# Patient Record
Sex: MALE | Race: WHITE | NOT HISPANIC OR LATINO | URBAN - METROPOLITAN AREA
[De-identification: names, ages, dates, MRNs, and addresses within clinical notes are randomized per-mention and may not be internally consistent; named-entity substitution may affect disease eponyms.]

---

## 2018-12-08 ENCOUNTER — INPATIENT (INPATIENT)
Facility: HOSPITAL | Age: 58
LOS: 13 days | Discharge: NURSING, REHAB OR LONG-TERM CARE | End: 2018-12-22
Attending: INTERNAL MEDICINE | Admitting: INTERNAL MEDICINE

## 2018-12-08 VITALS
OXYGEN SATURATION: 67 % | HEART RATE: 152 BPM | HEIGHT: 67 IN | SYSTOLIC BLOOD PRESSURE: 184 MMHG | DIASTOLIC BLOOD PRESSURE: 102 MMHG | RESPIRATION RATE: 32 BRPM | WEIGHT: 220.02 LBS

## 2018-12-08 LAB
ALBUMIN SERPL ELPH-MCNC: 3.8 G/DL — SIGNIFICANT CHANGE UP (ref 3.5–5.2)
ALBUMIN SERPL ELPH-MCNC: 4.7 G/DL — SIGNIFICANT CHANGE UP (ref 3.5–5.2)
ALP SERPL-CCNC: 130 U/L — HIGH (ref 30–115)
ALP SERPL-CCNC: 148 U/L — HIGH (ref 30–115)
ALT FLD-CCNC: 67 U/L — HIGH (ref 0–41)
ALT FLD-CCNC: 83 U/L — HIGH (ref 0–41)
ANION GAP SERPL CALC-SCNC: 12 MMOL/L — SIGNIFICANT CHANGE UP (ref 7–14)
ANION GAP SERPL CALC-SCNC: 18 MMOL/L — HIGH (ref 7–14)
AST SERPL-CCNC: 44 U/L — HIGH (ref 0–41)
AST SERPL-CCNC: 72 U/L — HIGH (ref 0–41)
BILIRUB SERPL-MCNC: 0.5 MG/DL — SIGNIFICANT CHANGE UP (ref 0.2–1.2)
BILIRUB SERPL-MCNC: 0.6 MG/DL — SIGNIFICANT CHANGE UP (ref 0.2–1.2)
BUN SERPL-MCNC: 14 MG/DL — SIGNIFICANT CHANGE UP (ref 10–20)
BUN SERPL-MCNC: 22 MG/DL — HIGH (ref 10–20)
CALCIUM SERPL-MCNC: 8.5 MG/DL — SIGNIFICANT CHANGE UP (ref 8.5–10.1)
CALCIUM SERPL-MCNC: 9 MG/DL — SIGNIFICANT CHANGE UP (ref 8.5–10.1)
CHLORIDE SERPL-SCNC: 93 MMOL/L — LOW (ref 98–110)
CHLORIDE SERPL-SCNC: 99 MMOL/L — SIGNIFICANT CHANGE UP (ref 98–110)
CK MB CFR SERPL CALC: 6.4 NG/ML — HIGH (ref 0.6–6.3)
CK SERPL-CCNC: 129 U/L — SIGNIFICANT CHANGE UP (ref 0–225)
CO2 SERPL-SCNC: 25 MMOL/L — SIGNIFICANT CHANGE UP (ref 17–32)
CO2 SERPL-SCNC: 30 MMOL/L — SIGNIFICANT CHANGE UP (ref 17–32)
CREAT SERPL-MCNC: 0.8 MG/DL — SIGNIFICANT CHANGE UP (ref 0.7–1.5)
CREAT SERPL-MCNC: 1 MG/DL — SIGNIFICANT CHANGE UP (ref 0.7–1.5)
GLUCOSE BLDC GLUCOMTR-MCNC: 102 MG/DL — HIGH (ref 70–99)
GLUCOSE BLDC GLUCOMTR-MCNC: 117 MG/DL — HIGH (ref 70–99)
GLUCOSE BLDC GLUCOMTR-MCNC: 132 MG/DL — HIGH (ref 70–99)
GLUCOSE BLDC GLUCOMTR-MCNC: 205 MG/DL — HIGH (ref 70–99)
GLUCOSE SERPL-MCNC: 289 MG/DL — HIGH (ref 70–99)
GLUCOSE SERPL-MCNC: 96 MG/DL — SIGNIFICANT CHANGE UP (ref 70–99)
HCT VFR BLD CALC: 45.2 % — SIGNIFICANT CHANGE UP (ref 42–52)
HCT VFR BLD CALC: 54.6 % — HIGH (ref 42–52)
HGB BLD-MCNC: 15.9 G/DL — SIGNIFICANT CHANGE UP (ref 14–18)
HGB BLD-MCNC: 18.6 G/DL — CRITICAL HIGH (ref 14–18)
LACTATE SERPL-SCNC: 1.4 MMOL/L — SIGNIFICANT CHANGE UP (ref 0.5–2.2)
MAGNESIUM SERPL-MCNC: 2.1 MG/DL — SIGNIFICANT CHANGE UP (ref 1.8–2.4)
MCHC RBC-ENTMCNC: 30.7 PG — SIGNIFICANT CHANGE UP (ref 27–31)
MCHC RBC-ENTMCNC: 30.9 PG — SIGNIFICANT CHANGE UP (ref 27–31)
MCHC RBC-ENTMCNC: 34.1 G/DL — SIGNIFICANT CHANGE UP (ref 32–37)
MCHC RBC-ENTMCNC: 35.2 G/DL — SIGNIFICANT CHANGE UP (ref 32–37)
MCV RBC AUTO: 87.8 FL — SIGNIFICANT CHANGE UP (ref 80–94)
MCV RBC AUTO: 90.2 FL — SIGNIFICANT CHANGE UP (ref 80–94)
NRBC # BLD: 0 /100 WBCS — SIGNIFICANT CHANGE UP (ref 0–0)
NRBC # BLD: 0 /100 WBCS — SIGNIFICANT CHANGE UP (ref 0–0)
NT-PROBNP SERPL-SCNC: 1617 PG/ML — HIGH (ref 0–300)
PHOSPHATE SERPL-MCNC: 6.2 MG/DL — HIGH (ref 2.1–4.9)
PLATELET # BLD AUTO: 276 K/UL — SIGNIFICANT CHANGE UP (ref 130–400)
PLATELET # BLD AUTO: 321 K/UL — SIGNIFICANT CHANGE UP (ref 130–400)
POTASSIUM SERPL-MCNC: 3.2 MMOL/L — LOW (ref 3.5–5)
POTASSIUM SERPL-MCNC: 4.7 MMOL/L — SIGNIFICANT CHANGE UP (ref 3.5–5)
POTASSIUM SERPL-SCNC: 3.2 MMOL/L — LOW (ref 3.5–5)
POTASSIUM SERPL-SCNC: 4.7 MMOL/L — SIGNIFICANT CHANGE UP (ref 3.5–5)
PROT SERPL-MCNC: 6.4 G/DL — SIGNIFICANT CHANGE UP (ref 6–8)
PROT SERPL-MCNC: 7.9 G/DL — SIGNIFICANT CHANGE UP (ref 6–8)
RBC # BLD: 5.15 M/UL — SIGNIFICANT CHANGE UP (ref 4.7–6.1)
RBC # BLD: 6.05 M/UL — SIGNIFICANT CHANGE UP (ref 4.7–6.1)
RBC # FLD: 13.1 % — SIGNIFICANT CHANGE UP (ref 11.5–14.5)
RBC # FLD: 13.2 % — SIGNIFICANT CHANGE UP (ref 11.5–14.5)
SODIUM SERPL-SCNC: 136 MMOL/L — SIGNIFICANT CHANGE UP (ref 135–146)
SODIUM SERPL-SCNC: 141 MMOL/L — SIGNIFICANT CHANGE UP (ref 135–146)
TROPONIN T SERPL-MCNC: 0.04 NG/ML — CRITICAL HIGH
TROPONIN T SERPL-MCNC: <0.01 NG/ML — SIGNIFICANT CHANGE UP
WBC # BLD: 16.04 K/UL — HIGH (ref 4.8–10.8)
WBC # BLD: 20.74 K/UL — HIGH (ref 4.8–10.8)
WBC # FLD AUTO: 16.04 K/UL — HIGH (ref 4.8–10.8)
WBC # FLD AUTO: 20.74 K/UL — HIGH (ref 4.8–10.8)

## 2018-12-08 RX ORDER — PROPOFOL 10 MG/ML
20 INJECTION, EMULSION INTRAVENOUS
Qty: 500 | Refills: 0 | Status: DISCONTINUED | OUTPATIENT
Start: 2018-12-08 | End: 2018-12-08

## 2018-12-08 RX ORDER — POTASSIUM CHLORIDE 20 MEQ
20 PACKET (EA) ORAL ONCE
Qty: 0 | Refills: 0 | Status: COMPLETED | OUTPATIENT
Start: 2018-12-08 | End: 2018-12-08

## 2018-12-08 RX ORDER — NITROGLYCERIN 6.5 MG
10 CAPSULE, EXTENDED RELEASE ORAL
Qty: 50 | Refills: 0 | Status: DISCONTINUED | OUTPATIENT
Start: 2018-12-08 | End: 2018-12-08

## 2018-12-08 RX ORDER — INSULIN HUMAN 100 [IU]/ML
2 INJECTION, SOLUTION SUBCUTANEOUS
Qty: 50 | Refills: 0 | Status: DISCONTINUED | OUTPATIENT
Start: 2018-12-08 | End: 2018-12-08

## 2018-12-08 RX ORDER — FENTANYL CITRATE 50 UG/ML
0.5 INJECTION INTRAVENOUS
Qty: 2500 | Refills: 0 | Status: DISCONTINUED | OUTPATIENT
Start: 2018-12-08 | End: 2018-12-08

## 2018-12-08 RX ORDER — DEXTROSE 50 % IN WATER 50 %
25 SYRINGE (ML) INTRAVENOUS
Qty: 0 | Refills: 0 | Status: DISCONTINUED | OUTPATIENT
Start: 2018-12-08 | End: 2018-12-22

## 2018-12-08 RX ORDER — ARIPIPRAZOLE 15 MG/1
1 TABLET ORAL
Qty: 0 | Refills: 0 | COMMUNITY

## 2018-12-08 RX ORDER — FUROSEMIDE 40 MG
40 TABLET ORAL
Qty: 0 | Refills: 0 | Status: DISCONTINUED | OUTPATIENT
Start: 2018-12-08 | End: 2018-12-09

## 2018-12-08 RX ORDER — PANTOPRAZOLE SODIUM 20 MG/1
40 TABLET, DELAYED RELEASE ORAL DAILY
Qty: 0 | Refills: 0 | Status: DISCONTINUED | OUTPATIENT
Start: 2018-12-08 | End: 2018-12-13

## 2018-12-08 RX ORDER — SODIUM CHLORIDE 9 MG/ML
3100 INJECTION, SOLUTION INTRAVENOUS ONCE
Qty: 0 | Refills: 0 | Status: DISCONTINUED | OUTPATIENT
Start: 2018-12-08 | End: 2018-12-08

## 2018-12-08 RX ORDER — FENTANYL CITRATE 50 UG/ML
100 INJECTION INTRAVENOUS ONCE
Qty: 0 | Refills: 0 | Status: DISCONTINUED | OUTPATIENT
Start: 2018-12-08 | End: 2018-12-08

## 2018-12-08 RX ORDER — ENOXAPARIN SODIUM 100 MG/ML
40 INJECTION SUBCUTANEOUS EVERY 24 HOURS
Qty: 0 | Refills: 0 | Status: DISCONTINUED | OUTPATIENT
Start: 2018-12-08 | End: 2018-12-12

## 2018-12-08 RX ORDER — CHLORHEXIDINE GLUCONATE 213 G/1000ML
15 SOLUTION TOPICAL
Qty: 0 | Refills: 0 | Status: DISCONTINUED | OUTPATIENT
Start: 2018-12-08 | End: 2018-12-21

## 2018-12-08 RX ORDER — ARIPIPRAZOLE 15 MG/1
20 TABLET ORAL DAILY
Qty: 0 | Refills: 0 | Status: DISCONTINUED | OUTPATIENT
Start: 2018-12-08 | End: 2018-12-22

## 2018-12-08 RX ORDER — DEXTROSE 50 % IN WATER 50 %
50 SYRINGE (ML) INTRAVENOUS
Qty: 0 | Refills: 0 | Status: DISCONTINUED | OUTPATIENT
Start: 2018-12-08 | End: 2018-12-22

## 2018-12-08 RX ORDER — NOREPINEPHRINE BITARTRATE/D5W 8 MG/250ML
0.05 PLASTIC BAG, INJECTION (ML) INTRAVENOUS
Qty: 8 | Refills: 0 | Status: DISCONTINUED | OUTPATIENT
Start: 2018-12-08 | End: 2018-12-20

## 2018-12-08 RX ORDER — FENTANYL CITRATE 50 UG/ML
0.5 INJECTION INTRAVENOUS
Qty: 2500 | Refills: 0 | Status: DISCONTINUED | OUTPATIENT
Start: 2018-12-08 | End: 2018-12-09

## 2018-12-08 RX ORDER — PROPOFOL 10 MG/ML
50 INJECTION, EMULSION INTRAVENOUS ONCE
Qty: 0 | Refills: 0 | Status: COMPLETED | OUTPATIENT
Start: 2018-12-08 | End: 2018-12-08

## 2018-12-08 RX ORDER — MIDAZOLAM HYDROCHLORIDE 1 MG/ML
0.02 INJECTION, SOLUTION INTRAMUSCULAR; INTRAVENOUS
Qty: 100 | Refills: 0 | Status: DISCONTINUED | OUTPATIENT
Start: 2018-12-08 | End: 2018-12-09

## 2018-12-08 RX ORDER — CHLORHEXIDINE GLUCONATE 213 G/1000ML
1 SOLUTION TOPICAL
Qty: 0 | Refills: 0 | Status: DISCONTINUED | OUTPATIENT
Start: 2018-12-08 | End: 2018-12-22

## 2018-12-08 RX ORDER — INSULIN HUMAN 100 [IU]/ML
2 INJECTION, SOLUTION SUBCUTANEOUS
Qty: 100 | Refills: 0 | Status: DISCONTINUED | OUTPATIENT
Start: 2018-12-08 | End: 2018-12-20

## 2018-12-08 RX ADMIN — Medication 40 MILLIGRAM(S): at 19:37

## 2018-12-08 RX ADMIN — Medication 9.36 MICROGRAM(S)/KG/MIN: at 15:00

## 2018-12-08 RX ADMIN — FENTANYL CITRATE 4.99 MICROGRAM(S)/KG/HR: 50 INJECTION INTRAVENOUS at 15:59

## 2018-12-08 RX ADMIN — FENTANYL CITRATE 100 MICROGRAM(S): 50 INJECTION INTRAVENOUS at 14:30

## 2018-12-08 RX ADMIN — PROPOFOL 50 MILLIGRAM(S): 10 INJECTION, EMULSION INTRAVENOUS at 15:35

## 2018-12-08 RX ADMIN — PROPOFOL 50 MILLIGRAM(S): 10 INJECTION, EMULSION INTRAVENOUS at 16:00

## 2018-12-08 RX ADMIN — ENOXAPARIN SODIUM 40 MILLIGRAM(S): 100 INJECTION SUBCUTANEOUS at 22:02

## 2018-12-08 RX ADMIN — Medication 50 MILLIEQUIVALENT(S): at 19:37

## 2018-12-08 RX ADMIN — MIDAZOLAM HYDROCHLORIDE 2 MG/KG/HR: 1 INJECTION, SOLUTION INTRAMUSCULAR; INTRAVENOUS at 19:38

## 2018-12-08 RX ADMIN — INSULIN HUMAN 2 UNIT(S)/HR: 100 INJECTION, SOLUTION SUBCUTANEOUS at 19:30

## 2018-12-08 RX ADMIN — FENTANYL CITRATE 4.99 MICROGRAM(S)/KG/HR: 50 INJECTION INTRAVENOUS at 19:30

## 2018-12-08 RX ADMIN — PROPOFOL 11.98 MICROGRAM(S)/KG/MIN: 10 INJECTION, EMULSION INTRAVENOUS at 17:18

## 2018-12-08 RX ADMIN — Medication 9.36 MICROGRAM(S)/KG/MIN: at 19:37

## 2018-12-08 RX ADMIN — Medication 3 MICROGRAM(S)/MIN: at 14:37

## 2018-12-08 NOTE — ED PROVIDER NOTE - PROGRESS NOTE DETAILS
patient accepted to ICU by Dr. Goss. I was directly involved in the care of this patient. PA Fellow Charisse note/plan reviewed and agreed.

## 2018-12-08 NOTE — ED PROVIDER NOTE - PMH
COPD (chronic obstructive pulmonary disease)    Diabetes    ETOH abuse    HLD (hyperlipidemia)    Hypertension, unspecified type    Psych & behavrl factors assoc w disord or dis classd elswhr    Schizophrenia

## 2018-12-08 NOTE — ED ADULT NURSE NOTE - PMH
Diabetes    Psych & behavrl factors assoc w disord or dis classd elswhr COPD (chronic obstructive pulmonary disease)    Diabetes    ETOH abuse    HLD (hyperlipidemia)    Hypertension, unspecified type    Psych & behavrl factors assoc w disord or dis classd elswhr    Schizophrenia

## 2018-12-08 NOTE — ED ADULT NURSE NOTE - NSIMPLEMENTINTERV_GEN_ALL_ED
Implemented All Universal Safety Interventions:  [place] to call system. Call bell, personal items and telephone within reach. Instruct patient to call for assistance. Room bathroom lighting operational. Non-slip footwear when patient is off stretcher. Physically safe environment: no spills, clutter or unnecessary equipment. Stretcher in lowest position, wheels locked, appropriate side rails in place.

## 2018-12-08 NOTE — ED PROCEDURE NOTE - ATTENDING CONTRIBUTION TO CARE
I was present for and supervised the key critical aspects of the procedures performed during the care of the patient.

## 2018-12-08 NOTE — ED PROVIDER NOTE - ATTENDING CONTRIBUTION TO CARE
58 M past medical history unknown brought in by EMS as respiratory distress.  Pt was at [employer] and collapsed shortness of breath.  According to brother who was with him, similar episode night before, better with sitting up.  Exam cyanotic, unreponsive, on CPAP prehospital.  Not responding.  Pt intubated on arrival.  Sat in the high 70s, FiO2 100% PEEP increased to 14.  nitro drip started and central line placed.  EKG no STEMI.  Chest X-Ray shows diffuse pulmonary edema.  Plan is admit to ICU, accepted by Dr. Goss.

## 2018-12-08 NOTE — CONSULT NOTE ADULT - SUBJECTIVE AND OBJECTIVE BOX
Patient is a 58y old  Male who presents with a chief complaint of sudden SOB.    HPI: Patient has a h/o DM and HTN. Had some shortness of breath last night and got better. This afternoon he had it again and was sent to ER by ambulance. No fever or vomiting. No chest pain. In ER patient had high BP on arrival and was treated with IV NTG and later he was intubated and required IV Levophed and IV NTG was discontinued due to low BP. Normal rectal temp in ER.        PAST MEDICAL & SURGICAL HISTORY:  ETOH abuse  HLD (hyperlipidemia)  Hypertension, unspecified type  COPD (chronic obstructive pulmonary disease)  Schizophrenia  Psych & behavrl factors assoc w disord or dis classd elswhr  Diabetes  No significant past surgical history        MEDICATIONS  (STANDING):  fentaNYL   Infusion 0.5 MICROgram(s)/kG/Hr (4.99 mL/Hr) IV Continuous <Continuous>  norepinephrine Infusion 0.05 MICROgram(s)/kG/Min (9.356 mL/Hr) IV Continuous <Continuous>  propofol Infusion 20 MICROgram(s)/kG/Min (11.976 mL/Hr) IV Continuous <Continuous>    Allergies    No Known Allergies    Intolerances  Personal History- smoker, from papers.    Vital Signs Last 24 Hrs  T(C): 36.9 (08 Dec 2018 14:16), Max: 36.9 (08 Dec 2018 14:16)  T(F): 98.4 (08 Dec 2018 14:16), Max: 98.4 (08 Dec 2018 14:16)  HR: 94 (08 Dec 2018 16:27) (94 - 152)  BP: 116/60 (08 Dec 2018 16:27) (100/57 - 184/102)  BP(mean): --  RR: 18 (08 Dec 2018 16:27) (18 - 32)  SpO2: 93% (08 Dec 2018 16:27) (67% - 93%)  PHYSICAL EXAM:      Constitutional: well developed.    Eyes: normal conjunctivae.    ENMT: intubated.    Neck: central line in right IJV.    Breasts:        Respiratory: CREPITATIONS, POSTERIORLY. NO WHEEZING.    Cardiovascular: Regular. no murmur.    Gastrointestinal: no tenderness or distension.        Rectal: deferred.    Extremities: no leg edema.    Vascular: no varicose veins.    Neurological: sedated on ventilator.    Skin: no rash.    Lymph Nodes: none felt.    Musculoskeletal:    Psychiatric:      12-08    136  |  93<L>  |  14  ----------------------------<  289<H>  3.2<L>   |  25  |  1.0    Ca    9.0      08 Dec 2018 14:12    TPro  7.9  /  Alb  4.7  /  TBili  0.6  /  DBili  x   /  AST  72<H>  /  ALT  83<H>  /  AlkPhos  148<H>  12-08      CBC Full  -  ( 08 Dec 2018 14:12 )  WBC Count : 20.74 K/uL  Hemoglobin : 18.6 g/dL  Hematocrit : 54.6 %  Platelet Count - Automated : 321 K/uL  Mean Cell Volume : 90.2 fL  Mean Cell Hemoglobin : 30.7 pg  Mean Cell Hemoglobin Concentration : 34.1 g/dL  Auto Neutrophil # : x  Auto Lymphocyte # : x  Auto Monocyte # : x  Auto Eosinophil # : x  Auto Basophil # : x  Auto Neutrophil % : x  Auto Lymphocyte % : x  Auto Monocyte % : x  Auto Eosinophil % : x  Auto Basophil % : x      ABG - ( 08 Dec 2018 14:33 )  pH, Arterial: 7.14  pH, Blood: x     /  pCO2: 69    /  pO2: 100   / HCO3: 24    / Base Excess: -7.2  /  SaO2: 95        Pro BNP- 1400          EKG sinus tachycardia , [facility].    Radiology: BILATERAL CONGESTION , RIGHT SMALL PLEURAL EFFUSION AND CARDIOMEGALY. ET tube ok.

## 2018-12-08 NOTE — ED PROVIDER NOTE - CARE PLAN
Principal Discharge DX:	Acute respiratory failure with hypoxia and hypercapnia  Secondary Diagnosis:	Acute congestive heart failure, unspecified heart failure type

## 2018-12-08 NOTE — ED PROVIDER NOTE - PHYSICAL EXAMINATION
I am unable to obtain a comprehensive history, review of systems, past medical history, and/or physical exam due to constraints imposed by the urgency of the patient's clinical condition and/or mental status.    CONST: Pt in acute distress brought by EMS on CPAP  CARD: Normal S1 S2; tachycardic  RESP: Distressed breathing with wheezing bilaterally  GI: Soft, non-tender, non-distended. normal BS  MS: No midline spinal tenderness. pulses 2 +. no calf tenderness or swelling  SKIN: Pale, cold, diaphoretic I am unable to obtain a comprehensive history, review of systems, past medical history, and/or physical exam due to constraints imposed by the urgency of the patient's clinical condition and/or mental status.    CONST: Pt in acute distress brought by EMS on CPAP  Eyes: PERRLA pupils equal size  CARD: Normal S1 S2; tachycardic  RESP: Distressed breathing with wheezing bilaterally and decreased b/l  GI: Soft, non-tender, non-distended. normal BS  MS: No midline spinal tenderness. pulses 2 +. no calf tenderness or swelling  SKIN: Pale, cold, diaphoretic  Neuro: patient responsive x 4 to pain

## 2018-12-08 NOTE — ED PROCEDURE NOTE - CPROC ED POST RADIOGRAPHY1
post-procedure radiography performed/gastric tube in stomach/duodenum
post-procedure radiography performed

## 2018-12-08 NOTE — ED PROCEDURE NOTE - CPROC ED TIME OUT STATEMENT1
“Patient's name, , procedure and correct site were confirmed during the [place] Timeout.”
“Patient's name, , procedure and correct site were confirmed during the [place] Timeout.”
“Patient's name, , procedure and correct site were confirmed during the [place] Timeout.”

## 2018-12-08 NOTE — CONSULT NOTE ADULT - ASSESSMENT
Assessment-  1. Acute pulmonary edema. R/O ACS.  2. Acute respiratory failure , requires intubation.  3. Shock due to CHF. On pressors.  4. DM - type 2, on insulin.  5. Schizophrenia ?.  6. H/O HTN.        Recommendations-  1. Admit in CCU.  2. Continue on the ventilator. A/C and PEEP. Decrease PEEP from 14 to 8 because of hypotension.  3. Maintain BP at present with IV Levophed. Decrease the requirement slowly as BP improves.  4. IV Lasix.  5. Sedation on the ventilator and use minimal propofol.  6. SC Lovenox for DVT prophylaxis.  7. Start ARB / B Blockers as BP permits.  8. Hold Victoza at present.  9. FS Glucose and insulin coverage.  10. Cardiac enzymes.  11. Replace potassium.  12. ABG later.  13. ECHO.  14. Cardiology consult.

## 2018-12-08 NOTE — ED PROVIDER NOTE - OBJECTIVE STATEMENT
57yo M brought by EMS with difficulty breathing. Pt had an episode of SOB last night that resolved with sitting 59yo M brought by EMS with difficulty breathing. Pt had an episode of SOB last night that resolved with sitting up. Today he was asymptomatic until he ate two slices of pizza and became acutely SOB. 59yo M brought by EMS with difficulty breathing. Pt had an episode of SOB last night that resolved with sitting up. Today he was shortness of breath and after he ate two slices of pizza and became acutely shortness of breath and finally family called 911. EMS states that he was in distress with low O2 sat and placed on CPAP and transported to hosptial .

## 2018-12-08 NOTE — ED PROCEDURE NOTE - CPROC ED GASTRIC INTUB DETAIL1
Placement was confirmed by aspiration of gastric secretions./The nasogastric tube (see size above) was inserted via the anatomic location./Bowel sounds present to 4 quadrants./Gastric tube connected to low continuous suction./Flush technique:

## 2018-12-09 LAB
ALBUMIN SERPL ELPH-MCNC: 3.8 G/DL — SIGNIFICANT CHANGE UP (ref 3.5–5.2)
ALP SERPL-CCNC: 109 U/L — SIGNIFICANT CHANGE UP (ref 30–115)
ALT FLD-CCNC: 63 U/L — HIGH (ref 0–41)
AMYLASE P1 CFR SERPL: 79 U/L — SIGNIFICANT CHANGE UP (ref 25–115)
ANION GAP SERPL CALC-SCNC: 11 MMOL/L — SIGNIFICANT CHANGE UP (ref 7–14)
APTT BLD: 28.9 SEC — SIGNIFICANT CHANGE UP (ref 27–39.2)
AST SERPL-CCNC: 34 U/L — SIGNIFICANT CHANGE UP (ref 0–41)
BASE EXCESS BLDA CALC-SCNC: 2.5 MMOL/L — HIGH (ref -2–2)
BILIRUB SERPL-MCNC: 0.6 MG/DL — SIGNIFICANT CHANGE UP (ref 0.2–1.2)
BUN SERPL-MCNC: 25 MG/DL — HIGH (ref 10–20)
CALCIUM SERPL-MCNC: 8.1 MG/DL — LOW (ref 8.5–10.1)
CHLORIDE SERPL-SCNC: 100 MMOL/L — SIGNIFICANT CHANGE UP (ref 98–110)
CK MB CFR SERPL CALC: 4.8 NG/ML — SIGNIFICANT CHANGE UP (ref 0.6–6.3)
CK SERPL-CCNC: 110 U/L — SIGNIFICANT CHANGE UP (ref 0–225)
CO2 SERPL-SCNC: 27 MMOL/L — SIGNIFICANT CHANGE UP (ref 17–32)
CREAT SERPL-MCNC: 0.8 MG/DL — SIGNIFICANT CHANGE UP (ref 0.7–1.5)
GLUCOSE BLDC GLUCOMTR-MCNC: 114 MG/DL — HIGH (ref 70–99)
GLUCOSE BLDC GLUCOMTR-MCNC: 117 MG/DL — HIGH (ref 70–99)
GLUCOSE BLDC GLUCOMTR-MCNC: 134 MG/DL — HIGH (ref 70–99)
GLUCOSE BLDC GLUCOMTR-MCNC: 151 MG/DL — HIGH (ref 70–99)
GLUCOSE BLDC GLUCOMTR-MCNC: 156 MG/DL — HIGH (ref 70–99)
GLUCOSE BLDC GLUCOMTR-MCNC: 158 MG/DL — HIGH (ref 70–99)
GLUCOSE BLDC GLUCOMTR-MCNC: 159 MG/DL — HIGH (ref 70–99)
GLUCOSE BLDC GLUCOMTR-MCNC: 162 MG/DL — HIGH (ref 70–99)
GLUCOSE BLDC GLUCOMTR-MCNC: 169 MG/DL — HIGH (ref 70–99)
GLUCOSE BLDC GLUCOMTR-MCNC: 185 MG/DL — HIGH (ref 70–99)
GLUCOSE SERPL-MCNC: 168 MG/DL — HIGH (ref 70–99)
GRAM STN FLD: SIGNIFICANT CHANGE UP
HCO3 BLDA-SCNC: 30 MMOL/L — HIGH (ref 23–27)
HCT VFR BLD CALC: 46 % — SIGNIFICANT CHANGE UP (ref 42–52)
HGB BLD-MCNC: 15.7 G/DL — SIGNIFICANT CHANGE UP (ref 14–18)
HOROWITZ INDEX BLDA+IHG-RTO: 80 — SIGNIFICANT CHANGE UP
INR BLD: 1.17 RATIO — SIGNIFICANT CHANGE UP (ref 0.65–1.3)
LACTATE SERPL-SCNC: 0.7 MMOL/L — SIGNIFICANT CHANGE UP (ref 0.5–2.2)
LIDOCAIN IGE QN: 40 U/L — SIGNIFICANT CHANGE UP (ref 7–60)
MAGNESIUM SERPL-MCNC: 1.9 MG/DL — SIGNIFICANT CHANGE UP (ref 1.8–2.4)
MCHC RBC-ENTMCNC: 30.8 PG — SIGNIFICANT CHANGE UP (ref 27–31)
MCHC RBC-ENTMCNC: 34.1 G/DL — SIGNIFICANT CHANGE UP (ref 32–37)
MCV RBC AUTO: 90.2 FL — SIGNIFICANT CHANGE UP (ref 80–94)
NRBC # BLD: 0 /100 WBCS — SIGNIFICANT CHANGE UP (ref 0–0)
PCO2 BLDA: 59 MMHG — HIGH (ref 38–42)
PH BLDA: 7.32 — LOW (ref 7.38–7.42)
PHOSPHATE SERPL-MCNC: 5.1 MG/DL — HIGH (ref 2.1–4.9)
PLATELET # BLD AUTO: 261 K/UL — SIGNIFICANT CHANGE UP (ref 130–400)
PO2 BLDA: 71 MMHG — LOW (ref 78–95)
POTASSIUM SERPL-MCNC: 4.1 MMOL/L — SIGNIFICANT CHANGE UP (ref 3.5–5)
POTASSIUM SERPL-SCNC: 4.1 MMOL/L — SIGNIFICANT CHANGE UP (ref 3.5–5)
PROT SERPL-MCNC: 6.4 G/DL — SIGNIFICANT CHANGE UP (ref 6–8)
PROTHROM AB SERPL-ACNC: 12.7 SEC — SIGNIFICANT CHANGE UP (ref 9.95–12.87)
RBC # BLD: 5.1 M/UL — SIGNIFICANT CHANGE UP (ref 4.7–6.1)
RBC # FLD: 13.7 % — SIGNIFICANT CHANGE UP (ref 11.5–14.5)
SAO2 % BLDA: 94 % — SIGNIFICANT CHANGE UP (ref 94–98)
SODIUM SERPL-SCNC: 138 MMOL/L — SIGNIFICANT CHANGE UP (ref 135–146)
SPECIMEN SOURCE: SIGNIFICANT CHANGE UP
TROPONIN T SERPL-MCNC: 0.05 NG/ML — CRITICAL HIGH
WBC # BLD: 14.08 K/UL — HIGH (ref 4.8–10.8)
WBC # FLD AUTO: 14.08 K/UL — HIGH (ref 4.8–10.8)

## 2018-12-09 RX ORDER — CEFEPIME 1 G/1
2000 INJECTION, POWDER, FOR SOLUTION INTRAMUSCULAR; INTRAVENOUS ONCE
Qty: 0 | Refills: 0 | Status: COMPLETED | OUTPATIENT
Start: 2018-12-09 | End: 2018-12-09

## 2018-12-09 RX ORDER — CEFEPIME 1 G/1
INJECTION, POWDER, FOR SOLUTION INTRAMUSCULAR; INTRAVENOUS
Qty: 0 | Refills: 0 | Status: DISCONTINUED | OUTPATIENT
Start: 2018-12-09 | End: 2018-12-10

## 2018-12-09 RX ORDER — CEFEPIME 1 G/1
2000 INJECTION, POWDER, FOR SOLUTION INTRAMUSCULAR; INTRAVENOUS EVERY 12 HOURS
Qty: 0 | Refills: 0 | Status: DISCONTINUED | OUTPATIENT
Start: 2018-12-09 | End: 2018-12-10

## 2018-12-09 RX ORDER — ACETAMINOPHEN 500 MG
325 TABLET ORAL EVERY 4 HOURS
Qty: 0 | Refills: 0 | Status: DISCONTINUED | OUTPATIENT
Start: 2018-12-09 | End: 2018-12-09

## 2018-12-09 RX ORDER — ACETAMINOPHEN 500 MG
650 TABLET ORAL EVERY 6 HOURS
Qty: 0 | Refills: 0 | Status: DISCONTINUED | OUTPATIENT
Start: 2018-12-09 | End: 2018-12-20

## 2018-12-09 RX ADMIN — Medication 325 MILLIGRAM(S): at 11:05

## 2018-12-09 RX ADMIN — INSULIN HUMAN 2 UNIT(S)/HR: 100 INJECTION, SOLUTION SUBCUTANEOUS at 15:00

## 2018-12-09 RX ADMIN — CHLORHEXIDINE GLUCONATE 1 APPLICATION(S): 213 SOLUTION TOPICAL at 02:37

## 2018-12-09 RX ADMIN — Medication 9.36 MICROGRAM(S)/KG/MIN: at 07:30

## 2018-12-09 RX ADMIN — Medication 650 MILLIGRAM(S): at 17:12

## 2018-12-09 RX ADMIN — MIDAZOLAM HYDROCHLORIDE 2 MG/KG/HR: 1 INJECTION, SOLUTION INTRAMUSCULAR; INTRAVENOUS at 07:30

## 2018-12-09 RX ADMIN — FENTANYL CITRATE 4.99 MICROGRAM(S)/KG/HR: 50 INJECTION INTRAVENOUS at 07:30

## 2018-12-09 RX ADMIN — Medication 9.36 MICROGRAM(S)/KG/MIN: at 05:38

## 2018-12-09 RX ADMIN — Medication 40 MILLIGRAM(S): at 05:53

## 2018-12-09 RX ADMIN — MIDAZOLAM HYDROCHLORIDE 2 MG/KG/HR: 1 INJECTION, SOLUTION INTRAMUSCULAR; INTRAVENOUS at 02:30

## 2018-12-09 RX ADMIN — CEFEPIME 100 MILLIGRAM(S): 1 INJECTION, POWDER, FOR SOLUTION INTRAMUSCULAR; INTRAVENOUS at 18:00

## 2018-12-09 RX ADMIN — Medication 75 MILLIGRAM(S): at 18:00

## 2018-12-09 RX ADMIN — CHLORHEXIDINE GLUCONATE 15 MILLILITER(S): 213 SOLUTION TOPICAL at 05:53

## 2018-12-09 RX ADMIN — Medication 9.36 MICROGRAM(S)/KG/MIN: at 10:52

## 2018-12-09 RX ADMIN — PANTOPRAZOLE SODIUM 40 MILLIGRAM(S): 20 TABLET, DELAYED RELEASE ORAL at 11:12

## 2018-12-09 RX ADMIN — Medication 325 MILLIGRAM(S): at 04:08

## 2018-12-09 RX ADMIN — MIDAZOLAM HYDROCHLORIDE 2 MG/KG/HR: 1 INJECTION, SOLUTION INTRAMUSCULAR; INTRAVENOUS at 15:26

## 2018-12-09 RX ADMIN — ENOXAPARIN SODIUM 40 MILLIGRAM(S): 100 INJECTION SUBCUTANEOUS at 21:45

## 2018-12-09 RX ADMIN — Medication 325 MILLIGRAM(S): at 12:30

## 2018-12-09 RX ADMIN — Medication 650 MILLIGRAM(S): at 15:22

## 2018-12-09 RX ADMIN — Medication 650 MILLIGRAM(S): at 23:04

## 2018-12-09 RX ADMIN — Medication 325 MILLIGRAM(S): at 02:30

## 2018-12-09 RX ADMIN — CEFEPIME 100 MILLIGRAM(S): 1 INJECTION, POWDER, FOR SOLUTION INTRAMUSCULAR; INTRAVENOUS at 03:02

## 2018-12-09 RX ADMIN — FENTANYL CITRATE 4.99 MICROGRAM(S)/KG/HR: 50 INJECTION INTRAVENOUS at 10:52

## 2018-12-09 RX ADMIN — CHLORHEXIDINE GLUCONATE 15 MILLILITER(S): 213 SOLUTION TOPICAL at 18:00

## 2018-12-09 RX ADMIN — Medication 75 MILLIGRAM(S): at 10:48

## 2018-12-09 RX ADMIN — ARIPIPRAZOLE 20 MILLIGRAM(S): 15 TABLET ORAL at 11:12

## 2018-12-09 NOTE — CONSULT NOTE ADULT - SUBJECTIVE AND OBJECTIVE BOX
CARDIOLOGY CONSULT NOTE     CHIEF COMPLAINT/REASON FOR CONSULT:    HPI:         Patient has a h/o DM and HTN. Had some shortness of breath one day ago  and got better. Yesterday afternoon he had it again and was sent to ER by ambulance. No fever or vomiting. No chest pain. In ER patient had high BP on arrival and was treated with IV NTG and later he was intubated and required IV Levophed.           PAST MEDICAL & SURGICAL HISTORY:  ETOH abuse  HLD (hyperlipidemia)  Hypertension, unspecified type  COPD (chronic obstructive pulmonary disease)  Schizophrenia  Diabetes  No significant past surgical history      Cardiac Risks:   [ x]HTN, [x ] DM, [ ] Smoking, [ ] FH,  [x ] Lipids        MEDICATIONS:  MEDICATIONS  (STANDING):  ARIPiprazole 20 milliGRAM(s) Oral daily  cefepime   IVPB      cefepime   IVPB 2000 milliGRAM(s) IV Intermittent every 12 hours  chlorhexidine 0.12% Liquid 15 milliLiter(s) Oral Mucosa two times a day  chlorhexidine 4% Liquid 1 Application(s) Topical <User Schedule>  dextrose 50% Injectable 50 milliLiter(s) IV Push every 15 minutes  dextrose 50% Injectable 25 milliLiter(s) IV Push every 15 minutes  enoxaparin Injectable 40 milliGRAM(s) SubCutaneous every 24 hours  fentaNYL   Infusion 0.5 MICROgram(s)/kG/Hr (4.99 mL/Hr) IV Continuous <Continuous>  furosemide   Injectable 40 milliGRAM(s) IV Push two times a day  insulin Infusion 2 Unit(s)/Hr (2 mL/Hr) IV Continuous <Continuous>  midazolam Infusion 0.02 mG/kG/Hr (1.996 mL/Hr) IV Continuous <Continuous>  norepinephrine Infusion 0.05 MICROgram(s)/kG/Min (9.356 mL/Hr) IV Continuous <Continuous>  pantoprazole  Injectable 40 milliGRAM(s) IV Push daily      FAMILY HISTORY:      SOCIAL HISTORY:      [ ] Marital status  Single  Allergies    No Known Allergies      	    REVIEW OF SYSTEMS:  CONSTITUTIONAL: No fever, weight loss, or fatigue  EYES: No eye pain, visual disturbances, or discharge  ENMT:  No difficulty hearing, tinnitus, vertigo; No sinus or throat pain  NECK: No pain or stiffness  RESPIRATORY: See above  CARDIOVASCULAR: No chest pain, palpitations, passing out, dizziness, or leg swelling  GASTROINTESTINAL: No abdominal or epigastric pain. No nausea, vomiting, or hematemesis; No diarrhea or constipation. No melena or hematochezia.  GENITOURINARY: No dysuria, frequency, hematuria, or incontinence  NEUROLOGICAL: No headaches, memory loss, loss of strength, numbness, or tremors  SKIN: No itching, burning, rashes, or lesions   	    [ ] All others negative	  [ ] Unable to obtain    PHYSICAL EXAM:  T(C): 38 (12-09-18 @ 06:00), Max: 38.5 (12-09-18 @ 02:00)  HR: 89 (12-09-18 @ 06:00) (86 - 152)  BP: 99/65 (12-09-18 @ 06:00) (80/51 - 184/102)  RR: 20 (12-09-18 @ 06:00) (14 - 32)  SpO2: 100% (12-09-18 @ 03:00) (67% - 100%)  Wt(kg): --  I&O's Summary    08 Dec 2018 07:01  -  09 Dec 2018 07:00  --------------------------------------------------------  IN: 857.4 mL / OUT: 905 mL / NET: -47.6 mL        Appearance: Normal	  Psychiatry: A & O x 3, Mood & affect appropriate  HEENT:   Normal oral mucosa, PERRL, EOMI	  Lymphatic: No lymphadenopathy  Cardiovascular: Normal S1 S2,RRR, No JVD, No murmurs  Respiratory: Lungs clear to auscultation	  Gastrointestinal:  Soft, Non-tender, + BS	  Skin: No rashes, No ecchymoses, No cyanosis	  Neurologic: Non-focal  Extremities: Normal range of motion, No clubbing, cyanosis or edema  Vascular: Peripheral pulses palpable 2+ bilaterally      ECG:  	nsr prwp    	  LABS:	 	    CARDIAC MARKERS:          Serum Pro-Brain Natriuretic Peptide: 1617 pg/mL (12-08 @ 14:12)                            15.9   16.04 )-----------( 276      ( 08 Dec 2018 21:54 )             45.2     12-08    141  |  99  |  22<H>  ----------------------------<  96  4.7   |  30  |  0.8    Ca    8.5      08 Dec 2018 21:54  Phos  6.2     12-08  Mg     2.1     12-08    TPro  6.4  /  Alb  3.8  /  TBili  0.5  /  DBili  x   /  AST  44<H>  /  ALT  67<H>  /  AlkPhos  130<H>  12-08      proBNP: Serum Pro-Brain Natriuretic Peptide: 1617 pg/mL (12-08 @ 14:12)

## 2018-12-09 NOTE — CONSULT NOTE ADULT - SUBJECTIVE AND OBJECTIVE BOX
[place]    57yo M brought by EMS with difficulty breathing. Pt had an episode of SOB last night that resolved with sitting up. Today he was shortness of breath and after he ate two slices of pizza and became acutely short of breath and finally family called 911. EMS states that he was in distress with low O2 sat and placed on CPAP and transported to hosptial .    Allergies    No Known Allergies    Intolerances        Daily Height in cm: 175.26 (08 Dec 2018 19:08)    Daily Weight in k.5 (09 Dec 2018 02:00)    I&O's Summary    08 Dec 2018 07:01  -  09 Dec 2018 07:00  --------------------------------------------------------  IN: 857.4 mL / OUT: 905 mL / NET: -47.6 mL    09 Dec 2018 07:01  -  09 Dec 2018 08:34  --------------------------------------------------------  IN: 104 mL / OUT: 0 mL / NET: 104 mL        FAMILY HISTORY: Unable to obtain due to patient's condition.        Social:  Unable to obtain due to patient's condition.          Vital Signs Last 24 Hrs  T(C): 37.3 (09 Dec 2018 07:11), Max: 38.5 (09 Dec 2018 02:00)  T(F): 99.1 (09 Dec 2018 07:11), Max: 101.3 (09 Dec 2018 02:00)  HR: 86 (09 Dec 2018 07:59) (86 - 152)  BP: 99/65 (09 Dec 2018 06:00) (80/51 - 184/102)  BP(mean): 73 (09 Dec 2018 03:00) (61 - 113)  RR: 23 (09 Dec 2018 07:11) (14 - 32)  SpO2: 99% (09 Dec 2018 07:59) (67% - 100%)      PT/INR - ( 09 Dec 2018 06:48 )   PT: 12.70 sec;   INR: 1.17 ratio         PTT - ( 09 Dec 2018 06:48 )  PTT:28.9 sec                          15.7   14.08 )-----------( 261      ( 09 Dec 2018 06:48 )             46.0           138  |  100  |  25<H>  ----------------------------<  168<H>  4.1   |  27  |  0.8    Ca    8.1<L>      09 Dec 2018 06:48  Phos  5.1       Mg     1.9         TPro  6.4  /  Alb  3.8  /  TBili  0.6  /  DBili  x   /  AST  34  /  ALT  63<H>  /  AlkPhos  109        CARDIAC MARKERS ( 09 Dec 2018 06:48 )  x     / 0.05 ng/mL / 110 U/L / x     / 4.8 ng/mL  CARDIAC MARKERS ( 08 Dec 2018 21:54 )  x     / 0.04 ng/mL / 129 U/L / x     / 6.4 ng/mL  CARDIAC MARKERS ( 08 Dec 2018 14:12 )  x     / <0.01 ng/mL / x     / x     / x            LIVER FUNCTIONS - ( 09 Dec 2018 06:48 )  Alb: 3.8 g/dL / Pro: 6.4 g/dL / ALK PHOS: 109 U/L / ALT: 63 U/L / AST: 34 U/L / GGT: x                 CAPILLARY BLOOD GLUCOSE      POCT Blood Glucose.: 158 mg/dL (09 Dec 2018 08:19)          ABG - ( 09 Dec 2018 06:17 )  pH, Arterial: 7.28  pH, Blood: x     /  pCO2: 57    /  pO2: 177   / HCO3: 26    / Base Excess: -1.7  /  SaO2: 98                  Mode: Auto Mode: PRVC/ Volume Support  RR (machine): 20  TV (machine): 500  FiO2: 80  PEEP: 8  MAP: 12  PIP: 20      Radiology: Radiology personally reviewed. CHF much improved.    MEDICATIONS  (STANDING):  ARIPiprazole 20 milliGRAM(s) Oral daily  cefepime   IVPB      cefepime   IVPB 2000 milliGRAM(s) IV Intermittent every 12 hours  chlorhexidine 0.12% Liquid 15 milliLiter(s) Oral Mucosa two times a day  chlorhexidine 4% Liquid 1 Application(s) Topical <User Schedule>  dextrose 50% Injectable 50 milliLiter(s) IV Push every 15 minutes  dextrose 50% Injectable 25 milliLiter(s) IV Push every 15 minutes  enoxaparin Injectable 40 milliGRAM(s) SubCutaneous every 24 hours  fentaNYL   Infusion 0.5 MICROgram(s)/kG/Hr (4.99 mL/Hr) IV Continuous <Continuous>  furosemide   Injectable 40 milliGRAM(s) IV Push two times a day  insulin Infusion 2 Unit(s)/Hr (2 mL/Hr) IV Continuous <Continuous>  midazolam Infusion 0.02 mG/kG/Hr (1.996 mL/Hr) IV Continuous <Continuous>  norepinephrine Infusion 0.05 MICROgram(s)/kG/Min (9.356 mL/Hr) IV Continuous <Continuous>  oseltamivir 75 milliGRAM(s) Oral two times a day  pantoprazole  Injectable 40 milliGRAM(s) IV Push daily    MEDICATIONS  (PRN):  acetaminophen  Suppository .. 325 milliGRAM(s) Rectal every 4 hours PRN Temp greater or equal to 38C (100.4F)        REVIEW OF SYSTEMS:    Review of Systems:  Unable to obtain due to patient's condition.    PHYSICAL EXAM:   · CONSTITUTIONAL: Well appearing, well nourished, sedated  · ENMT: Airway patent, Nasal mucosa clear. Mouth with normal mucosa. Throat has no vesicles, no oropharyngeal exudates and uvula is midline.  · EYES: Clear bilaterally, pupils equal, round and reactive to light.  · CARDIAC: Normal rate, regular rhythm.  Heart sounds S1, S2.  No murmurs, rubs or gallops.  · RESPIRATORY: b/l rales  · GASTROINTESTINAL: Abdomen soft, non-tender, no guarding.  · MUSCULOSKELETAL: Spine appears normal, range of motion is not limited, no muscle or joint tenderness  · NEUROLOGICAL: Alert and oriented, no focal deficits, no motor or sensory deficits.  · SKIN: Skin normal color for race, warm, dry and intact. No evidence of rash.  · PSYCHIATRIC: sedated.  · HEME LYMPH: No adenopathy or splenomegaly. No cervical or inguinal lymphadenopathy. [place]    59yo M brought by EMS with difficulty breathing. Pt had an episode of SOB last night that resolved with sitting up. Today he was shortness of breath and after he ate two slices of pizza and became acutely short of breath and finally family called 911. EMS states that he was in distress with low O2 sat and placed on CPAP and transported to hosptial .    Allergies    No Known Allergies    Intolerances        Daily Height in cm: 175.26 (08 Dec 2018 19:08)    Daily Weight in k.5 (09 Dec 2018 02:00)    I&O's Summary    08 Dec 2018 07:01  -  09 Dec 2018 07:00  --------------------------------------------------------  IN: 857.4 mL / OUT: 905 mL / NET: -47.6 mL    09 Dec 2018 07:01  -  09 Dec 2018 08:34  --------------------------------------------------------  IN: 104 mL / OUT: 0 mL / NET: 104 mL        FAMILY HISTORY: Unable to obtain due to patient's condition.        Social:  Unable to obtain due to patient's condition.          Vital Signs Last 24 Hrs  T(C): 37.3 (09 Dec 2018 07:11), Max: 38.5 (09 Dec 2018 02:00)  T(F): 99.1 (09 Dec 2018 07:11), Max: 101.3 (09 Dec 2018 02:00)  HR: 86 (09 Dec 2018 07:59) (86 - 152)  BP: 99/65 (09 Dec 2018 06:00) (80/51 - 184/102)  BP(mean): 73 (09 Dec 2018 03:00) (61 - 113)  RR: 23 (09 Dec 2018 07:11) (14 - 32)  SpO2: 99% (09 Dec 2018 07:59) (67% - 100%)      PT/INR - ( 09 Dec 2018 06:48 )   PT: 12.70 sec;   INR: 1.17 ratio         PTT - ( 09 Dec 2018 06:48 )  PTT:28.9 sec                          15.7   14.08 )-----------( 261      ( 09 Dec 2018 06:48 )             46.0           138  |  100  |  25<H>  ----------------------------<  168<H>  4.1   |  27  |  0.8    Ca    8.1<L>      09 Dec 2018 06:48  Phos  5.1       Mg     1.9         TPro  6.4  /  Alb  3.8  /  TBili  0.6  /  DBili  x   /  AST  34  /  ALT  63<H>  /  AlkPhos  109        CARDIAC MARKERS ( 09 Dec 2018 06:48 )  x     / 0.05 ng/mL / 110 U/L / x     / 4.8 ng/mL  CARDIAC MARKERS ( 08 Dec 2018 21:54 )  x     / 0.04 ng/mL / 129 U/L / x     / 6.4 ng/mL  CARDIAC MARKERS ( 08 Dec 2018 14:12 )  x     / <0.01 ng/mL / x     / x     / x            LIVER FUNCTIONS - ( 09 Dec 2018 06:48 )  Alb: 3.8 g/dL / Pro: 6.4 g/dL / ALK PHOS: 109 U/L / ALT: 63 U/L / AST: 34 U/L / GGT: x                 CAPILLARY BLOOD GLUCOSE      POCT Blood Glucose.: 158 mg/dL (09 Dec 2018 08:19)          ABG - ( 09 Dec 2018 06:17 )  pH, Arterial: 7.28  pH, Blood: x     /  pCO2: 57    /  pO2: 177   / HCO3: 26    / Base Excess: -1.7  /  SaO2: 98                  Mode: Auto Mode: PRVC/ Volume Support  RR (machine): 20  TV (machine): 500  FiO2: 80  PEEP: 8  MAP: 12  PIP: 20      Radiology: Radiology personally reviewed. CHF much improved. ETT high and was advanced    MEDICATIONS  (STANDING):  ARIPiprazole 20 milliGRAM(s) Oral daily  cefepime   IVPB      cefepime   IVPB 2000 milliGRAM(s) IV Intermittent every 12 hours  chlorhexidine 0.12% Liquid 15 milliLiter(s) Oral Mucosa two times a day  chlorhexidine 4% Liquid 1 Application(s) Topical <User Schedule>  dextrose 50% Injectable 50 milliLiter(s) IV Push every 15 minutes  dextrose 50% Injectable 25 milliLiter(s) IV Push every 15 minutes  enoxaparin Injectable 40 milliGRAM(s) SubCutaneous every 24 hours  fentaNYL   Infusion 0.5 MICROgram(s)/kG/Hr (4.99 mL/Hr) IV Continuous <Continuous>  furosemide   Injectable 40 milliGRAM(s) IV Push two times a day  insulin Infusion 2 Unit(s)/Hr (2 mL/Hr) IV Continuous <Continuous>  midazolam Infusion 0.02 mG/kG/Hr (1.996 mL/Hr) IV Continuous <Continuous>  norepinephrine Infusion 0.05 MICROgram(s)/kG/Min (9.356 mL/Hr) IV Continuous <Continuous>  oseltamivir 75 milliGRAM(s) Oral two times a day  pantoprazole  Injectable 40 milliGRAM(s) IV Push daily    MEDICATIONS  (PRN):  acetaminophen  Suppository .. 325 milliGRAM(s) Rectal every 4 hours PRN Temp greater or equal to 38C (100.4F)        REVIEW OF SYSTEMS:    Review of Systems:  Unable to obtain due to patient's condition.    PHYSICAL EXAM:   · CONSTITUTIONAL: Well appearing, well nourished, sedated  · ENMT: Airway patent, Nasal mucosa clear. Mouth with normal mucosa. Throat has no vesicles, no oropharyngeal exudates and uvula is midline.  · EYES: Clear bilaterally, pupils equal, round and reactive to light.  · CARDIAC: Normal rate, regular rhythm.  Heart sounds S1, S2.  No murmurs, rubs or gallops.  · RESPIRATORY: b/l rales  · GASTROINTESTINAL: Abdomen soft, non-tender, no guarding.  · MUSCULOSKELETAL: Spine appears normal, range of motion is not limited, no muscle or joint tenderness  · NEUROLOGICAL: Alert and oriented, no focal deficits, no motor or sensory deficits.  · SKIN: Skin normal color for race, warm, dry and intact. No evidence of rash.  · PSYCHIATRIC: sedated.  · HEME LYMPH: No adenopathy or splenomegaly. No cervical or inguinal lymphadenopathy.

## 2018-12-09 NOTE — PROGRESS NOTE ADULT - SUBJECTIVE AND OBJECTIVE BOX
ISRAEL MO  58y  Male    Patient is a 58y old  Male who presents with a chief complaint of   ALLERGIES:  No Known Allergies      INTERVAL HPI/OVERNIGHT EVENTS:    VITALS:  T(F): 100.4 (12-09-18 @ 08:02), Max: 101.3 (12-09-18 @ 02:00)  HR: 86 (12-09-18 @ 07:59) (86 - 152)  BP: 105/65 (12-09-18 @ 09:00) (80/51 - 184/102)  RR: 20 (12-09-18 @ 11:00) (14 - 32)  SpO2: 99% (12-09-18 @ 07:59) (67% - 100%)    LABS:  12-09    138  |  100  |  25<H>  ----------------------------<  168<H>  4.1   |  27  |  0.8    Ca    8.1<L>      09 Dec 2018 06:48  Phos  5.1     12-09  Mg     1.9     12-09    TPro  6.4  /  Alb  3.8  /  TBili  0.6  /  DBili  x   /  AST  34  /  ALT  63<H>  /  AlkPhos  109  12-09    MICROBIOLOGY:    MEDICATION:  acetaminophen  Suppository .. 325 milliGRAM(s) Rectal every 4 hours PRN  ARIPiprazole 20 milliGRAM(s) Oral daily  cefepime   IVPB      cefepime   IVPB 2000 milliGRAM(s) IV Intermittent every 12 hours  chlorhexidine 0.12% Liquid 15 milliLiter(s) Oral Mucosa two times a day  chlorhexidine 4% Liquid 1 Application(s) Topical <User Schedule>  dextrose 50% Injectable 50 milliLiter(s) IV Push every 15 minutes  dextrose 50% Injectable 25 milliLiter(s) IV Push every 15 minutes  enoxaparin Injectable 40 milliGRAM(s) SubCutaneous every 24 hours  fentaNYL   Infusion 0.5 MICROgram(s)/kG/Hr IV Continuous <Continuous>  furosemide   Injectable 40 milliGRAM(s) IV Push two times a day  insulin Infusion 2 Unit(s)/Hr IV Continuous <Continuous>  midazolam Infusion 0.02 mG/kG/Hr IV Continuous <Continuous>  norepinephrine Infusion 0.05 MICROgram(s)/kG/Min IV Continuous <Continuous>  oseltamivir 75 milliGRAM(s) Oral two times a day  pantoprazole  Injectable 40 milliGRAM(s) IV Push daily    RADIOLOGY & ADDITIONAL TESTS:

## 2018-12-09 NOTE — PROGRESS NOTE ADULT - ASSESSMENT
Sudden Acute respiratory failure C/W CHF  possible aspiration with LLL infiltrate     PLAN:-----------------------------------------------------------  keep sedated  no more Lasix may need fluids  echo card F/U  cont check cardiac enzymes  cont IV abx  full cultures  gastritis DVT prophylaxis  influenzae swab  taper PEEP

## 2018-12-09 NOTE — CONSULT NOTE ADULT - ASSESSMENT
Patient now on vent sedated on levophed. He now appears to have pneumonia. Need r/o mi. He needs DVT gi  prophylaxis. Culture antibiotics. He needs a echo . Prognosis guarded Statement Selected

## 2018-12-09 NOTE — CONSULT NOTE ADULT - ASSESSMENT
Sudden Acute respiratory failure C/W CHF  possible aspiration with LLL infiltrate     PLAN:  keep sedated  no more lasix  echo card F/U  cont check cardiac enzymes  cont IV abx  full cultures  gastritis DVT prophylaxis  influenzae swab Sudden Acute respiratory failure C/W CHF  possible aspiration with LLL infiltrate     PLAN:  keep sedated  no more lasixmay need fluids  echo card F/U  cont check cardiac enzymes  cont IV abx  full cultures  gastritis DVT prophylaxis  influenzae swab  taper PEEP

## 2018-12-10 LAB
ALBUMIN SERPL ELPH-MCNC: 3.3 G/DL — LOW (ref 3.5–5.2)
ALP SERPL-CCNC: 92 U/L — SIGNIFICANT CHANGE UP (ref 30–115)
ALT FLD-CCNC: 44 U/L — HIGH (ref 0–41)
ANION GAP SERPL CALC-SCNC: 9 MMOL/L — SIGNIFICANT CHANGE UP (ref 7–14)
APPEARANCE UR: CLEAR — SIGNIFICANT CHANGE UP
AST SERPL-CCNC: 23 U/L — SIGNIFICANT CHANGE UP (ref 0–41)
BILIRUB SERPL-MCNC: 0.7 MG/DL — SIGNIFICANT CHANGE UP (ref 0.2–1.2)
BILIRUB UR-MCNC: NEGATIVE — SIGNIFICANT CHANGE UP
BUN SERPL-MCNC: 19 MG/DL — SIGNIFICANT CHANGE UP (ref 10–20)
CALCIUM SERPL-MCNC: 8.5 MG/DL — SIGNIFICANT CHANGE UP (ref 8.5–10.1)
CHLORIDE SERPL-SCNC: 102 MMOL/L — SIGNIFICANT CHANGE UP (ref 98–110)
CK MB CFR SERPL CALC: 3.2 NG/ML — SIGNIFICANT CHANGE UP (ref 0.6–6.3)
CK SERPL-CCNC: 394 U/L — HIGH (ref 0–225)
CO2 SERPL-SCNC: 30 MMOL/L — SIGNIFICANT CHANGE UP (ref 17–32)
COLOR SPEC: YELLOW — SIGNIFICANT CHANGE UP
CREAT SERPL-MCNC: 0.6 MG/DL — LOW (ref 0.7–1.5)
CULTURE RESULTS: NO GROWTH — SIGNIFICANT CHANGE UP
DIFF PNL FLD: ABNORMAL
GLUCOSE BLDC GLUCOMTR-MCNC: 141 MG/DL — HIGH (ref 70–99)
GLUCOSE BLDC GLUCOMTR-MCNC: 145 MG/DL — HIGH (ref 70–99)
GLUCOSE BLDC GLUCOMTR-MCNC: 161 MG/DL — HIGH (ref 70–99)
GLUCOSE BLDC GLUCOMTR-MCNC: 167 MG/DL — HIGH (ref 70–99)
GLUCOSE BLDC GLUCOMTR-MCNC: 169 MG/DL — HIGH (ref 70–99)
GLUCOSE BLDC GLUCOMTR-MCNC: 180 MG/DL — HIGH (ref 70–99)
GLUCOSE BLDC GLUCOMTR-MCNC: 214 MG/DL — HIGH (ref 70–99)
GLUCOSE BLDC GLUCOMTR-MCNC: 251 MG/DL — HIGH (ref 70–99)
GLUCOSE SERPL-MCNC: 157 MG/DL — HIGH (ref 70–99)
GLUCOSE UR QL: NEGATIVE MG/DL — SIGNIFICANT CHANGE UP
GRAM STN FLD: SIGNIFICANT CHANGE UP
HCT VFR BLD CALC: 41 % — LOW (ref 42–52)
HGB BLD-MCNC: 14 G/DL — SIGNIFICANT CHANGE UP (ref 14–18)
KETONES UR-MCNC: NEGATIVE — SIGNIFICANT CHANGE UP
LEUKOCYTE ESTERASE UR-ACNC: ABNORMAL
MCHC RBC-ENTMCNC: 30.5 PG — SIGNIFICANT CHANGE UP (ref 27–31)
MCHC RBC-ENTMCNC: 34.1 G/DL — SIGNIFICANT CHANGE UP (ref 32–37)
MCV RBC AUTO: 89.3 FL — SIGNIFICANT CHANGE UP (ref 80–94)
NITRITE UR-MCNC: NEGATIVE — SIGNIFICANT CHANGE UP
NRBC # BLD: 0 /100 WBCS — SIGNIFICANT CHANGE UP (ref 0–0)
PH UR: 6 — SIGNIFICANT CHANGE UP (ref 5–8)
PLATELET # BLD AUTO: 217 K/UL — SIGNIFICANT CHANGE UP (ref 130–400)
POTASSIUM SERPL-MCNC: 4.1 MMOL/L — SIGNIFICANT CHANGE UP (ref 3.5–5)
POTASSIUM SERPL-SCNC: 4.1 MMOL/L — SIGNIFICANT CHANGE UP (ref 3.5–5)
PROT SERPL-MCNC: 5.5 G/DL — LOW (ref 6–8)
PROT UR-MCNC: NEGATIVE MG/DL — SIGNIFICANT CHANGE UP
RAPID RVP RESULT: SIGNIFICANT CHANGE UP
RBC # BLD: 4.59 M/UL — LOW (ref 4.7–6.1)
RBC # FLD: 13.9 % — SIGNIFICANT CHANGE UP (ref 11.5–14.5)
SODIUM SERPL-SCNC: 141 MMOL/L — SIGNIFICANT CHANGE UP (ref 135–146)
SP GR SPEC: 1.02 — SIGNIFICANT CHANGE UP (ref 1.01–1.03)
SPECIMEN SOURCE: SIGNIFICANT CHANGE UP
SPECIMEN SOURCE: SIGNIFICANT CHANGE UP
TROPONIN T SERPL-MCNC: 0.02 NG/ML — HIGH
UROBILINOGEN FLD QL: 0.2 MG/DL — SIGNIFICANT CHANGE UP (ref 0.2–0.2)
WBC # BLD: 11.43 K/UL — HIGH (ref 4.8–10.8)
WBC # FLD AUTO: 11.43 K/UL — HIGH (ref 4.8–10.8)

## 2018-12-10 RX ORDER — MEROPENEM 1 G/30ML
INJECTION INTRAVENOUS
Qty: 0 | Refills: 0 | Status: DISCONTINUED | OUTPATIENT
Start: 2018-12-10 | End: 2018-12-20

## 2018-12-10 RX ORDER — MEROPENEM 1 G/30ML
1000 INJECTION INTRAVENOUS EVERY 8 HOURS
Qty: 0 | Refills: 0 | Status: DISCONTINUED | OUTPATIENT
Start: 2018-12-10 | End: 2018-12-20

## 2018-12-10 RX ORDER — MEROPENEM 1 G/30ML
1000 INJECTION INTRAVENOUS ONCE
Qty: 0 | Refills: 0 | Status: COMPLETED | OUTPATIENT
Start: 2018-12-10 | End: 2018-12-10

## 2018-12-10 RX ORDER — VANCOMYCIN HCL 1 G
1500 VIAL (EA) INTRAVENOUS EVERY 12 HOURS
Qty: 0 | Refills: 0 | Status: DISCONTINUED | OUTPATIENT
Start: 2018-12-10 | End: 2018-12-13

## 2018-12-10 RX ORDER — FUROSEMIDE 40 MG
40 TABLET ORAL ONCE
Qty: 0 | Refills: 0 | Status: COMPLETED | OUTPATIENT
Start: 2018-12-10 | End: 2018-12-10

## 2018-12-10 RX ORDER — VANCOMYCIN HCL 1 G
1500 VIAL (EA) INTRAVENOUS ONCE
Qty: 0 | Refills: 0 | Status: COMPLETED | OUTPATIENT
Start: 2018-12-10 | End: 2018-12-10

## 2018-12-10 RX ORDER — VANCOMYCIN HCL 1 G
VIAL (EA) INTRAVENOUS
Qty: 0 | Refills: 0 | Status: DISCONTINUED | OUTPATIENT
Start: 2018-12-10 | End: 2018-12-13

## 2018-12-10 RX ADMIN — CEFEPIME 100 MILLIGRAM(S): 1 INJECTION, POWDER, FOR SOLUTION INTRAMUSCULAR; INTRAVENOUS at 06:01

## 2018-12-10 RX ADMIN — CHLORHEXIDINE GLUCONATE 15 MILLILITER(S): 213 SOLUTION TOPICAL at 06:01

## 2018-12-10 RX ADMIN — Medication 50 MILLIGRAM(S): at 17:01

## 2018-12-10 RX ADMIN — Medication 650 MILLIGRAM(S): at 17:55

## 2018-12-10 RX ADMIN — Medication 300 MILLIGRAM(S): at 17:02

## 2018-12-10 RX ADMIN — Medication 40 MILLIGRAM(S): at 11:34

## 2018-12-10 RX ADMIN — ARIPIPRAZOLE 20 MILLIGRAM(S): 15 TABLET ORAL at 11:36

## 2018-12-10 RX ADMIN — CHLORHEXIDINE GLUCONATE 1 APPLICATION(S): 213 SOLUTION TOPICAL at 01:30

## 2018-12-10 RX ADMIN — CHLORHEXIDINE GLUCONATE 15 MILLILITER(S): 213 SOLUTION TOPICAL at 17:03

## 2018-12-10 RX ADMIN — Medication 300 MILLIGRAM(S): at 11:34

## 2018-12-10 RX ADMIN — ENOXAPARIN SODIUM 40 MILLIGRAM(S): 100 INJECTION SUBCUTANEOUS at 21:36

## 2018-12-10 RX ADMIN — Medication 650 MILLIGRAM(S): at 10:41

## 2018-12-10 RX ADMIN — Medication 650 MILLIGRAM(S): at 08:39

## 2018-12-10 RX ADMIN — Medication 650 MILLIGRAM(S): at 16:13

## 2018-12-10 RX ADMIN — MEROPENEM 100 MILLIGRAM(S): 1 INJECTION INTRAVENOUS at 11:33

## 2018-12-10 RX ADMIN — MEROPENEM 100 MILLIGRAM(S): 1 INJECTION INTRAVENOUS at 22:53

## 2018-12-10 RX ADMIN — Medication 75 MILLIGRAM(S): at 06:00

## 2018-12-10 RX ADMIN — PANTOPRAZOLE SODIUM 40 MILLIGRAM(S): 20 TABLET, DELAYED RELEASE ORAL at 12:13

## 2018-12-10 NOTE — PROGRESS NOTE ADULT - ASSESSMENT
IMPRESSION:  Acute on chronic hypercapnic respiratory failure on MV  Septic shock secondary to aspiration PNA  Pulmonary edema secondary to CHF  HO Schizophrenia  Smoker      PLAN:    CNS: C/w versed and fentanyl target RAAS -2;     HEENT: Oral care    PULMONARY: HOB at 45 degrees; Prednisone 50 mg; Vent changes: Decrease ; Increase PEEP 8; Taper Fio2     CARDIOVASCULAR: Keep I<O; Lasix IV 40 mg x1; TTE; Bedside echo to evaluate IVC; Taper Levophed to MAP > 65.     GI: GI prophylaxis.  Feeding as tolerated;     RENAL:  FU lytes;     INFECTIOUS DISEASE: Panculture; MRSA nasal swab; D/C cefepime; Start Meropenem and Vanco; D/C Tamiflu    HEMATOLOGICAL:  DVT prophylaxis.    ENDOCRINE:  Follow up FS.  Insulin protocol if needed.    MICU monitoring     TIME SPENT: IMPRESSION:  Acute on chronic hypercapnic respiratory failure on MV  Septic shock secondary to aspiration PNA  Pulmonary edema secondary to CHF  HO Schizophrenia  Smoker      PLAN:    CNS: C/w versed and fentanyl target RAAS -2;     HEENT: Oral care    PULMONARY: HOB at 45 degrees; Prednisone 50 mg; Vent changes: Decrease ; Increase PEEP 8; Taper Fio2     CARDIOVASCULAR: Keep I<O;   Lasix IV 40 mg x1; TTE; Bedside echo to evaluate IVC;   Taper Levophed to MAP > 65.     GI: GI prophylaxis.  Feeding as tolerated;     RENAL:  FU lytes;     INFECTIOUS DISEASE: Panculture; MRSA nasal swab; D/C cefepime; Start Meropenem and Vanco; D/C Tamiflu    HEMATOLOGICAL:  DVT prophylaxis.    ENDOCRINE:  Follow up FS.  Insulin protocol if needed.    MICU monitoring

## 2018-12-10 NOTE — H&P ADULT - NSHPPHYSICALEXAM_GEN_ALL_CORE
ICU Vital Signs Last 24 Hrs  T(C): 37.9 (10 Dec 2018 11:40), Max: 39.1 (10 Dec 2018 09:00)  T(F): 100.2 (10 Dec 2018 11:40), Max: 102.4 (10 Dec 2018 09:00)  HR: 82 (10 Dec 2018 11:11) (78 - 102)  BP: 120/66 (10 Dec 2018 11:11) (89/53 - 129/70)  BP(mean): 90 (10 Dec 2018 09:00) (65 - 93)  RR: 22 (10 Dec 2018 11:11) (20 - 22)  SpO2: 96% (10 Dec 2018 11:11) (94% - 99%)    GEN: intubated & sedated  LUNGS: vent sounds detected b/l  HEART: S1/S2 present. RRR.   ABD: Soft, non-tender, non-distended. Bowel sounds present  EXT: no LE edema  NEURO: sedated

## 2018-12-10 NOTE — PROGRESS NOTE ADULT - SUBJECTIVE AND OBJECTIVE BOX
ISRAEL MO  58y  Male    Patient is a 58y old  Male who presents with a chief complaint of   ALLERGIES:  No Known Allergies      INTERVAL HPI/OVERNIGHT EVENTS:    VITALS:  T(F): 102.4 (12-10-18 @ 09:00), Max: 102.4 (12-10-18 @ 09:00)  HR: 87 (12-10-18 @ 09:09) (78 - 102)  BP: 119/72 (12-10-18 @ 09:00) (89/53 - 129/70)  RR: 22 (12-10-18 @ 09:00) (20 - 22)  SpO2: 96% (12-10-18 @ 09:09) (94% - 99%)    LABS:  12-10    141  |  102  |  19  ----------------------------<  157<H>  4.1   |  30  |  0.6<L>    Ca    8.5      10 Dec 2018 06:30  Phos  5.1     12-09  Mg     1.9     12-09    TPro  5.5<L>  /  Alb  3.3<L>  /  TBili  0.7  /  DBili  x   /  AST  23  /  ALT  44<H>  /  AlkPhos  92  12-10    MICROBIOLOGY:    MEDICATION:  acetaminophen    Suspension .. 650 milliGRAM(s) Enteral Tube every 6 hours PRN  ARIPiprazole 20 milliGRAM(s) Oral daily  cefepime   IVPB      cefepime   IVPB 2000 milliGRAM(s) IV Intermittent every 12 hours  chlorhexidine 0.12% Liquid 15 milliLiter(s) Oral Mucosa two times a day  chlorhexidine 4% Liquid 1 Application(s) Topical <User Schedule>  dextrose 50% Injectable 50 milliLiter(s) IV Push every 15 minutes  dextrose 50% Injectable 25 milliLiter(s) IV Push every 15 minutes  enoxaparin Injectable 40 milliGRAM(s) SubCutaneous every 24 hours  fentaNYL   Infusion 0.5 MICROgram(s)/kG/Hr IV Continuous <Continuous>  insulin Infusion 2 Unit(s)/Hr IV Continuous <Continuous>  midazolam Infusion 0.02 mG/kG/Hr IV Continuous <Continuous>  norepinephrine Infusion 0.05 MICROgram(s)/kG/Min IV Continuous <Continuous>  oseltamivir 75 milliGRAM(s) Oral two times a day  pantoprazole  Injectable 40 milliGRAM(s) IV Push daily    RADIOLOGY & ADDITIONAL TESTS:

## 2018-12-10 NOTE — H&P ADULT - ASSESSMENT
58/M hx of DM & HTN & schizophrenia? & smoker, presented to ED with SOB with orthopnea. Became hypotensive in ED after IV NTG was given for elevated BP. In ED WBC 20K, Lactic Acid 4, Trops 0.04 > 0.05, BNP 1600, CXR with b/l diffuse opacities. Admit to CCU for Acute respiratory failure from septic Shock requiring intubation and pressor support. During hospital course pt found to have a fever (101.5F), concerning for PNA (LLL).    Overnight - pt had fever 102F  [X] TLC  [X] Nichole  [X] intubated  [X] Pressor    Problem List  Acute on chronic hypercapnic respiratory failure on MV  Septic shock secondary to aspiration PNA  Pulmonary edema secondary to CHF  HO Schizophrenia  Smoker    # Septic Shock due to aspiration PNA & Acute on chronic hypercapneic respiratory failure  - Intubated & Vented - A/C & PEEP  - Sedated with midazolam & fentanyl  - IV levophed   - Start Vanc & rex, d/c cefepime  - RVP negative  - F/u bld cx & Urine cx & UA  - Prednisone PO daily (12/10 start)     # Pulmonary edema secondary to CHF   - F/u TTE (no prev TTE echo available)  - IV lasix today  - Cardiology following  - CE 0.04>0.05; repeat today    # DM - insulin infusion  # Hx of HTN - off BP meds in view of shock  # Schizophrenia - on ariprazole  # DVT/GI ppx     Full Code 58/M hx of DM & HTN & schizophrenia? & smoker, presented to ED with SOB with orthopnea. Became hypotensive in ED after IV NTG was given for elevated BP. In ED WBC 20K, Lactic Acid 4, Trops 0.04 > 0.05, BNP 1600, CXR with b/l diffuse opacities. Admit to CCU for Acute respiratory failure from septic Shock requiring intubation and pressor support. During hospital course pt found to have a fever (101.5F), concerning for PNA (LLL).    Overnight - pt had fever 102F  [X] TLC  [X] Nichole  [X] intubated  [X] Pressor    Problem List  Acute on chronic hypercapnic respiratory failure on MV  Septic shock secondary to aspiration PNA  Pulmonary edema secondary to CHF  HO Schizophrenia  Smoker    # Septic Shock due to aspiration PNA & Acute on chronic hypercapneic respiratory failure  - Intubated & Vented - A/C & PEEP  - Sedated with midazolam & fentanyl  - IV levophed   - Start Vanc & rex, d/c cefepime  - RVP negative  - F/u bld cx & Urine cx & UA & MRSA nares  - Prednisone PO daily (12/10 start)     # Pulmonary edema secondary to CHF   - F/u TTE (no prev TTE echo available)  - IV lasix today  - Cardiology following  - CE 0.04>0.05; repeat today    # DM - insulin infusion  # Hx of HTN - off BP meds in view of shock  # Schizophrenia - on ariprazole  # DVT/GI ppx     Full Code 58/M hx of DM & HTN & schizophrenia? & smoker, presented to ED with SOB with orthopnea. Became hypotensive in ED after IV NTG was given for elevated BP. In ED WBC 20K, Lactic Acid 4, Trops 0.04 > 0.05, BNP 1600, CXR with b/l diffuse opacities. Admit to CCU for Acute respiratory failure from septic Shock requiring intubation and pressor support. During hospital course pt found to have a fever (101.5F), concerning for PNA (LLL).    Overnight - pt had fever 102F  [X] TLC  [X] Nichole  [X] intubated  [X] Pressor    Problem List  Acute on chronic hypercapnic respiratory failure on MV  Septic shock secondary to aspiration PNA  Pulmonary edema secondary to CHF  HO Schizophrenia  Smoker    # Septic Shock due to aspiration PNA & Acute on chronic hypercapneic respiratory failure  - Intubated & Vented - A/C & PEEP  - Sedated with midazolam & fentanyl  - IV levophed   - Start Vanc & rex, d/c cefepime  - RVP negative  - F/u bld cx & Urine cx & UA & MRSA nares  - Prednisone PO daily (12/10 start)     # Pulmonary edema secondary to CHF   - TTE (verbal read over phone of Cardiologists report) - mod impaired function 30-35%, LV dilated, LV enlarged, mod MR, mild TR  - IV lasix today  - Cardiology following  - CE 0.04>0.05; repeat today    # DM - insulin infusion  # Hx of HTN - off BP meds in view of shock  # Schizophrenia - on ariprazole  # DVT/GI ppx     Full Code

## 2018-12-10 NOTE — DIETITIAN INITIAL EVALUATION ADULT. - ENERGY NEEDS
Calories: 1372-1600kcals/day (PSU 2003b: 60%-70% RMR 2286)   Protein: 110-146g/day (1.5-2g/IBW)  Fluid: as per CCU team

## 2018-12-10 NOTE — PROGRESS NOTE ADULT - SUBJECTIVE AND OBJECTIVE BOX
Patient is a 58y old  Male who presents with a chief complaint of     OVER NIGHT EVENTS:  On levophed 0.1; Versed and fentanyl for sedation;  Still febrile;     PHYSICAL EXAM    ICU Vital Signs Last 24 Hrs  T(C): 39.1 (10 Dec 2018 09:00), Max: 39.1 (10 Dec 2018 09:00)  T(F): 102.4 (10 Dec 2018 09:00), Max: 102.4 (10 Dec 2018 09:00)  HR: 87 (10 Dec 2018 09:09) (78 - 102)  BP: 119/72 (10 Dec 2018 09:00) (89/53 - 129/70)  BP(mean): 90 (10 Dec 2018 09:00) (65 - 93)  RR: 22 (10 Dec 2018 09:00) (20 - 22)  SpO2: 96% (10 Dec 2018 09:09) (94% - 99%)    I&O's Detail    09 Dec 2018 07:01  -  10 Dec 2018 07:00  --------------------------------------------------------  IN:    Enteral Tube Flush: 50 mL    Enteral Tube Flush: 50 mL    fentaNYL  Infusion: 240 mL    Glucerna: 1240 mL    insulin Infusion: 19 mL    IV PiggyBack: 150 mL    midazolam Infusion: 168 mL    norepinephrine Infusion: 569 mL  Total IN: 2486 mL    OUT:    Indwelling Catheter - Urethral: 1575 mL  Total OUT: 1575 mL    Total NET: 911 mL      10 Dec 2018 07:01  -  10 Dec 2018 09:31  --------------------------------------------------------  IN:    fentaNYL  Infusion: 30 mL    Glucerna: 180 mL    insulin Infusion: 3 mL    midazolam Infusion: 21 mL    norepinephrine Infusion: 64 mL  Total IN: 298 mL    OUT:    Indwelling Catheter - Urethral: 70 mL  Total OUT: 70 mL    Total NET: 228 mL    General: Comfortable in bed sedated  HEENT:  ETT +            Lungs: DEVYN over bases with crackles   Cardiovascular: RRR, S1S2  Abdomen: BS+ve; soft non tender  Extremities: No LE edema  Skin:  No evident Rash  Neurological:  No focal deficit      LABS:                          14.0   11.43 )-----------( 217      ( 10 Dec 2018 06:30 )             41.0   12-10    141  |  102  |  19  ----------------------------<  157<H>  4.1   |  30  |  0.6<L>    Ca    8.5      10 Dec 2018 06:30  Phos  5.1     12-09  Mg     1.9     12-09    TPro  5.5<L>  /  Alb  3.3<L>  /  TBili  0.7  /  DBili  x   /  AST  23  /  ALT  44<H>  /  AlkPhos  92  12-10      PT/INR - ( 09 Dec 2018 06:48 )   PT: 12.70 sec;   INR: 1.17 ratio         PTT - ( 09 Dec 2018 06:48 )  PTT:28.9 sec                                           CARDIAC MARKERS ( 09 Dec 2018 06:48 )  x     / 0.05 ng/mL / 110 U/L / x     / 4.8 ng/mL  CARDIAC MARKERS ( 08 Dec 2018 21:54 )  x     / 0.04 ng/mL / 129 U/L / x     / 6.4 ng/mL  CARDIAC MARKERS ( 08 Dec 2018 14:12 )  x     / <0.01 ng/mL / x     / x     / x        Serum Pro-Brain Natriuretic Peptide: 1617 pg/mL (12.08.18 @ 14:12)                                              LIVER FUNCTIONS - ( 10 Dec 2018 06:30 )  Alb: 3.3 g/dL / Pro: 5.5 g/dL / ALK PHOS: 92 U/L / ALT: 44 U/L / AST: 23 U/L / GGT: x                              Lactate (12-09-18 @ 06:48): 0.7  Lactate (12-08-18 @ 21:54): 1.4      Culture - Sputum (collected 09 Dec 2018 04:15)  Source: .Sputum Sputum  Gram Stain (09 Dec 2018 22:53):    Numerous polymorphonuclear leukocytes per low power field    No Squamous epithelial cells per low power field    Rare Gram Positive Cocci in Clusters per oil power field    Rare Gram positive cocci in pairs per oil power field    Rare Gram Negative Rods per oil power field                                                 Mode: Auto Mode: PRVC/ Volume Support  RR (machine): 22  TV (machine): 500  FiO2: 80  PEEP: 5  MAP: 11  PIP: 23                                        ABG - ( 09 Dec 2018 18:21 )  pH, Arterial: 7.36 pH, Blood: x     /  pCO2: 56/  pO2: 99 / HCO3: 30    / Base Excess: 2.5   /  SaO2: 94        LA 0.6    MEDICATIONS  (STANDING):  ARIPiprazole 20 milliGRAM(s) Oral daily  cefepime   IVPB      cefepime   IVPB 2000 milliGRAM(s) IV Intermittent every 12 hours  chlorhexidine 0.12% Liquid 15 milliLiter(s) Oral Mucosa two times a day  chlorhexidine 4% Liquid 1 Application(s) Topical <User Schedule>  dextrose 50% Injectable 50 milliLiter(s) IV Push every 15 minutes  dextrose 50% Injectable 25 milliLiter(s) IV Push every 15 minutes  enoxaparin Injectable 40 milliGRAM(s) SubCutaneous every 24 hours  fentaNYL   Infusion 0.5 MICROgram(s)/kG/Hr (4.99 mL/Hr) IV Continuous <Continuous>  insulin Infusion 2 Unit(s)/Hr (2 mL/Hr) IV Continuous <Continuous>  midazolam Infusion 0.02 mG/kG/Hr (1.996 mL/Hr) IV Continuous <Continuous>  norepinephrine Infusion 0.05 MICROgram(s)/kG/Min (9.356 mL/Hr) IV Continuous <Continuous>  oseltamivir 75 milliGRAM(s) Oral two times a day  pantoprazole  Injectable 40 milliGRAM(s) IV Push daily    MEDICATIONS  (PRN):  acetaminophen    Suspension .. 650 milliGRAM(s) Enteral Tube every 6 hours PRN Temp greater or equal to 38C (100.4F)      Radiology:  Cxr: Bilateral basilar opacities; Hilar congestion; ETT ok

## 2018-12-10 NOTE — PROGRESS NOTE ADULT - ASSESSMENT
IMPRESSION:  Acute on chronic hypercapnic respiratory failure on MV  Septic shock secondary to aspiration PNA  Pulmonary edema secondary to CHF  HO Schizophrenia  Smoker      PLAN:--------------------------------------------------------------------------------    CNS: C/w versed and fentanyl target RAAS -2;     HEENT: Oral care    PULMONARY: HOB at 45 degrees; Prednisone 50 mg; Vent changes: Decrease ; Increase PEEP 8; Taper Fio2     CARDIOVASCULAR: Keep I<O; Lasix IV 40 mg x1; TTE; Bedside echo to evaluate IVC; Taper Levophed to MAP > 65.     GI: GI prophylaxis.  Feeding as tolerated;     RENAL:  FU lytes;     INFECTIOUS DISEASE: Panculture; MRSA nasal swab; D/C cefepime; Start Meropenem and Vanco; D/C Tamiflu    HEMATOLOGICAL:  DVT prophylaxis.    ENDOCRINE:  Follow up FS.  Insulin protocol if needed    critical care notes appreciated

## 2018-12-10 NOTE — PROGRESS NOTE ADULT - ASSESSMENT
58/M hx of DM & HTN & schizophrenia? & smoker, presented to ED with SOB with orthopnea. Became hypotensive in ED after IV NTG was given for elevated BP. Required intubation and pressor support. Admit to CCU for Acute respiratory failure from Septic Shock requiring intubation and pressor support. CXR concerning for LLL infiltrate - PNA?     Overnight - pt had fever 102F  [X] TLC  [X] Nichole  [X] intubated  [X] Pressor    Problem List  Acute on chronic hypercapnic respiratory failure on MV  Septic shock secondary to aspiration PNA  Pulmonary edema secondary to CHF  HO Schizophrenia  Smoker    # Septic Shock due to aspiration PNA & Acute on chronic hypercapneic respiratory failure  - Intubated & Vented - A/C & PEEP  - Sedated with midazolam & fentanyl  - IV levophed   - Start Vanc & rex, d/c cefepime  - RVP negative  - F/u bld cx & Urine cx & UA  - Prednisone PO daily (12/10 start)     # Pulmonary edema secondary to CHF  - F/u TTE (no prev TTE echo available)  - IV lasix today  - Cardiology following    # DM - insulin infusion  # Hx of HTN - off BP meds in view of shock  # Schizophrenia - on ariprazole  # DVT/GI ppx     Full Code

## 2018-12-10 NOTE — H&P ADULT - HISTORY OF PRESENT ILLNESS
58/M hx of DM & HTN, presented to ED via EMS for SOB. Pt had an episode of SOB night PTP that resolved with sitting up. Day of presentation he had SOB, especially after  two slices of pizza, and finally family called 911. EMS states that he was in distress with low O2 sat and placed on CPAP. In ER patient had high BP on arrival and was treated with IV NTG, later he was intubated and required IV Levophed for low BP (IV NTG was d/c'ed). In ED WBC 20K, Lactic Acid 4, Trops 0.04 > 0.05, BNP 1600, CXR with b/l diffuse opacities. Admit to CCU for Acute respiratory failure from septic Shock requiring intubation and pressor support. During hospital course pt found to have a fever (101.5F), concerning for PNA (LLL).

## 2018-12-10 NOTE — PROGRESS NOTE ADULT - SUBJECTIVE AND OBJECTIVE BOX
SUBJECTIVE:    58/M hx of DM & HTN, presented to ED via EMS for SOB. Pt had an episode of SOB night PTP that resolved with sitting up. Day of presentation he had SOB, especially after  two slices of pizza, and finally family called 911. EMS states that he was in distress with low O2 sat and placed on CPAP. In ER patient had high BP on arrival and was treated with IV NTG, later he was intubated and required IV Levophed for low BP (IV NTG was d/c'ed). In ED WBC 20K, Lactic Acid 4, Trops 0.04 > 0.05, BNP 1600, CXR with b/l diffuse opacities. Admit to CCU for Acute respiratory failure from Cardiogenic Shock requiring intubation and pressor support. During hospital course pt found to have a fever (101.5F), concerning for PNA (LLL).     Today is hospital day 2d.Pt remains intubated and sedated, fever overnight.     PAST MEDICAL & SURGICAL HISTORY  ETOH abuse  HLD (hyperlipidemia)  Hypertension, unspecified type  COPD (chronic obstructive pulmonary disease)  Schizophrenia  Psych & behavrl factors assoc w disord or dis classd elswhr  Diabetes  No significant past surgical history    SOCIAL HISTORY:  Negative for smoking/alcohol/drug use.     ALLERGIES:  No Known Allergies    MEDICATIONS:  STANDING MEDICATIONS  ARIPiprazole 20 milliGRAM(s) Oral daily  chlorhexidine 0.12% Liquid 15 milliLiter(s) Oral Mucosa two times a day  chlorhexidine 4% Liquid 1 Application(s) Topical <User Schedule>  dextrose 50% Injectable 50 milliLiter(s) IV Push every 15 minutes  dextrose 50% Injectable 25 milliLiter(s) IV Push every 15 minutes  enoxaparin Injectable 40 milliGRAM(s) SubCutaneous every 24 hours  fentaNYL   Infusion 0.5 MICROgram(s)/kG/Hr IV Continuous <Continuous>  insulin Infusion 2 Unit(s)/Hr IV Continuous <Continuous>  meropenem  IVPB 1000 milliGRAM(s) IV Intermittent every 8 hours  meropenem  IVPB      midazolam Infusion 0.02 mG/kG/Hr IV Continuous <Continuous>  norepinephrine Infusion 0.05 MICROgram(s)/kG/Min IV Continuous <Continuous>  pantoprazole  Injectable 40 milliGRAM(s) IV Push daily  predniSONE   Tablet 50 milliGRAM(s) Oral daily  vancomycin  IVPB      vancomycin  IVPB 1500 milliGRAM(s) IV Intermittent every 12 hours    PRN MEDICATIONS  acetaminophen    Suspension .. 650 milliGRAM(s) Enteral Tube every 6 hours PRN    VITALS:   ICU Vital Signs Last 24 Hrs  T(C): 37.9 (10 Dec 2018 11:40), Max: 39.1 (10 Dec 2018 09:00)  T(F): 100.2 (10 Dec 2018 11:40), Max: 102.4 (10 Dec 2018 09:00)  HR: 82 (10 Dec 2018 11:11) (78 - 102)  BP: 120/66 (10 Dec 2018 11:11) (89/53 - 129/70)  BP(mean): 90 (10 Dec 2018 09:00) (65 - 93)  RR: 22 (10 Dec 2018 11:11) (20 - 22)  SpO2: 96% (10 Dec 2018 11:11) (94% - 99%)      LABS:                        14.0   11.43 )-----------( 217      ( 10 Dec 2018 06:30 )             41.0     12-10    141  |  102  |  19  ----------------------------<  157<H>  4.1   |  30  |  0.6<L>    Ca    8.5      10 Dec 2018 06:30  Phos  5.1     12-09  Mg     1.9     12-09    TPro  5.5<L>  /  Alb  3.3<L>  /  TBili  0.7  /  DBili  x   /  AST  23  /  ALT  44<H>  /  AlkPhos  92  12-10    PT/INR - ( 09 Dec 2018 06:48 )   PT: 12.70 sec;   INR: 1.17 ratio         PTT - ( 09 Dec 2018 06:48 )  PTT:28.9 sec    ABG - ( 09 Dec 2018 18:21 )  pH, Arterial: 7.32  pH, Blood: x     /  pCO2: 59    /  pO2: 71    / HCO3: 30    / Base Excess: 2.5   /  SaO2: 94        Culture - Sputum (collected 09 Dec 2018 04:15)  Source: .Sputum Sputum  Gram Stain (09 Dec 2018 22:53):    Numerous polymorphonuclear leukocytes per low power field    No Squamous epithelial cells per low power field    Rare Gram Positive Cocci in Clusters per oil power field    Rare Gram positive cocci in pairs per oil power field    Rare Gram Negative Rods per oil power field      CARDIAC MARKERS ( 09 Dec 2018 06:48 )  x     / 0.05 ng/mL / 110 U/L / x     / 4.8 ng/mL  CARDIAC MARKERS ( 08 Dec 2018 21:54 )  x     / 0.04 ng/mL / 129 U/L / x     / 6.4 ng/mL  CARDIAC MARKERS ( 08 Dec 2018 14:12 )  x     / <0.01 ng/mL / x     / x     / x          RADIOLOGY:    PHYSICAL EXAM:  GEN: intubated & sedated  LUNGS: vent sounds detected b/l  HEART: S1/S2 present. RRR.   ABD: Soft, non-tender, non-distended. Bowel sounds present  EXT: no LE edema  NEURO: sedated

## 2018-12-10 NOTE — PROGRESS NOTE ADULT - SUBJECTIVE AND OBJECTIVE BOX
Patient is a 58y old  Male who presents with a chief complaint of     T(F): 99.5 (12-10-18 @ 03:00), Max: 101.9 (12-09-18 @ 15:02)  HR: 80 (12-10-18 @ 05:00)  BP: 114/65 (12-10-18 @ 05:00)  RR: 22 (12-10-18 @ 05:00)  SpO2: 99% (12-10-18 @ 05:00) (94% - 99%)    PHYSICAL EXAM:  GENERAL: NAD, well-groomed, well-developed  HEAD:  Atraumatic, Normocephalic  EYES: EOMI, PERRLA, conjunctiva and sclera clear  ENMT: No tonsillar erythema, exudates, or enlargement; Moist mucous membranes, Good dentition, No lesions  NECK: Supple, No JVD, Normal thyroid  NERVOUS SYSTEM:  Alert & Oriented X3,  Motor Strength 5/5 B/L upper and lower extremities  CHEST/LUNG: Clear to percussion bilaterally; No rales, rhonchi, wheezing, or rubs  HEART: Regular rate and rhythm; No murmurs, rubs, or gallops  ABDOMEN: Soft, Nontender, Nondistended; Bowel sounds present  EXTREMITIES:   No clubbing, cyanosis, or edema  LYMPH: No lymphadenopathy noted  SKIN: No rashes or lesions    labs  12-09    138  |  100  |  25<H>  ----------------------------<  168<H>  4.1   |  27  |  0.8    Ca    8.1<L>      09 Dec 2018 06:48  Phos  5.1     12-09  Mg     1.9     12-09    TPro  6.4  /  Alb  3.8  /  TBili  0.6  /  DBili  x   /  AST  34  /  ALT  63<H>  /  AlkPhos  109  12-09                          14.0   11.43 )-----------( 217      ( 10 Dec 2018 06:30 )             41.0       Culture - Sputum (collected 09 Dec 2018 04:15)  Source: .Sputum Sputum  Gram Stain (09 Dec 2018 22:53):    Numerous polymorphonuclear leukocytes per low power field    No Squamous epithelial cells per low power field    Rare Gram Positive Cocci in Clusters per oil power field    Rare Gram positive cocci in pairs per oil power field    Rare Gram Negative Rods per oil power field      PT/INR - ( 09 Dec 2018 06:48 )   PT: 12.70 sec;   INR: 1.17 ratio         PTT - ( 09 Dec 2018 06:48 )  PTT:28.9 sec  Mode: Auto Mode: PRVC/ Volume Support  RR (machine): 20  TV (machine): 500  FiO2: 80  PEEP: 5  MAP: 10  PIP: 20        acetaminophen    Suspension .. 650 milliGRAM(s) Enteral Tube every 6 hours PRN  ARIPiprazole 20 milliGRAM(s) Oral daily  cefepime   IVPB      cefepime   IVPB 2000 milliGRAM(s) IV Intermittent every 12 hours  chlorhexidine 0.12% Liquid 15 milliLiter(s) Oral Mucosa two times a day  chlorhexidine 4% Liquid 1 Application(s) Topical <User Schedule>  dextrose 50% Injectable 50 milliLiter(s) IV Push every 15 minutes  dextrose 50% Injectable 25 milliLiter(s) IV Push every 15 minutes  enoxaparin Injectable 40 milliGRAM(s) SubCutaneous every 24 hours  fentaNYL   Infusion 0.5 MICROgram(s)/kG/Hr IV Continuous <Continuous>  insulin Infusion 2 Unit(s)/Hr IV Continuous <Continuous>  midazolam Infusion 0.02 mG/kG/Hr IV Continuous <Continuous>  norepinephrine Infusion 0.05 MICROgram(s)/kG/Min IV Continuous <Continuous>  oseltamivir 75 milliGRAM(s) Oral two times a day  pantoprazole  Injectable 40 milliGRAM(s) IV Push daily

## 2018-12-10 NOTE — DIETITIAN INITIAL EVALUATION ADULT. - OTHER INFO
Reason for assessment: Intubated day 2. PMH: DM, HTN. Pt present to ED for SOB. Pt is admitted for acute resp failure requiring intubation. Septic shock secondary to aspiration PNA and pulmonary edema secondary to CHF noted. Pt is tolerating TF. Abdomen noted as soft/nontender. Last BM was yesterday 12/9. NKFA documented. Reason for assessment: Intubated day 2. PMH: DM, HTN. Pt present to ED for SOB. Pt is admitted for acute resp failure requiring intubation. Septic shock 2/2 aspiration PNA and pulmonary edema 2/2 CHF noted. Pt is tolerating TF. Abdomen noted as soft/nontender. Last BM was yesterday 12/9. NKFA documented.

## 2018-12-10 NOTE — DIETITIAN INITIAL EVALUATION ADULT. - DIET TYPE
Glucerna 1.2 @64ml/hr (1440ml total volume, 1728kcals, 86g protein, 1166ml water)/NPO with tube feedings

## 2018-12-10 NOTE — H&P ADULT - NSHPLABSRESULTS_GEN_ALL_CORE
14.0   11.43 )-----------( 217      ( 10 Dec 2018 06:30 )             41.0       Hemoglobin: 14.0 g/dL (12-10 @ 06:30)  Hemoglobin: 15.7 g/dL (12-09 @ 06:48)  Hemoglobin: 15.9 g/dL (12-08 @ 21:54)  Hemoglobin: 18.6 g/dL (12-08 @ 14:12)      12-10    141  |  102  |  19  ----------------------------<  157<H>  4.1   |  30  |  0.6<L>    Ca    8.5      10 Dec 2018 06:30  Phos  5.1     12-09  Mg     1.9     12-09    TPro  5.5<L>  /  Alb  3.3<L>  /  TBili  0.7  /  DBili  x   /  AST  23  /  ALT  44<H>  /  AlkPhos  92  12-10        Creatinine Trend: 0.6<--, 0.8<--, 0.8<--, 1.0<--          12:10 - ABG - pH: 7.36  |  pCO2: 58    |  pO2: 71    | Lactate:       | BE: 4.9    18:21 - ABG - pH: 7.32  |  pCO2: 59    |  pO2: 71    | Lactate:       | BE: 2.5          Hemoglobin A1C   PT/INR - ( 09 Dec 2018 06:48 )   PT: 12.70 sec;   INR: 1.17 ratio         PTT - ( 09 Dec 2018 06:48 )  PTT:28.9 sec      Lactate Trend  12-09 @ 06:48 Lactate:0.7   12-08 @ 21:54 Lactate:1.4       CARDIAC MARKERS ( 09 Dec 2018 06:48 )  x     / 0.05 ng/mL / 110 U/L / x     / 4.8 ng/mL  CARDIAC MARKERS ( 08 Dec 2018 21:54 )  x     / 0.04 ng/mL / 129 U/L / x     / 6.4 ng/mL  CARDIAC MARKERS ( 08 Dec 2018 14:12 )  x     / <0.01 ng/mL / x     / x     / x            CAPILLARY BLOOD GLUCOSE      POCT Blood Glucose.: 169 mg/dL (10 Dec 2018 11:24)

## 2018-12-10 NOTE — PROGRESS NOTE ADULT - ASSESSMENT
Patient sedated on vent. Febrile on pressors. Enzymes not consistent with MI. Resp viral panel neg. Would consider trying to hold sedation . Check cultures . Echo

## 2018-12-11 LAB
ALBUMIN SERPL ELPH-MCNC: 3.7 G/DL — SIGNIFICANT CHANGE UP (ref 3.5–5.2)
ALP SERPL-CCNC: 119 U/L — HIGH (ref 30–115)
ALT FLD-CCNC: 37 U/L — SIGNIFICANT CHANGE UP (ref 0–41)
ANION GAP SERPL CALC-SCNC: 5 MMOL/L — LOW (ref 7–14)
ANION GAP SERPL CALC-SCNC: 8 MMOL/L — SIGNIFICANT CHANGE UP (ref 7–14)
AST SERPL-CCNC: 21 U/L — SIGNIFICANT CHANGE UP (ref 0–41)
BASOPHILS # BLD AUTO: 0.02 K/UL — SIGNIFICANT CHANGE UP (ref 0–0.2)
BASOPHILS NFR BLD AUTO: 0.2 % — SIGNIFICANT CHANGE UP (ref 0–1)
BILIRUB SERPL-MCNC: 0.5 MG/DL — SIGNIFICANT CHANGE UP (ref 0.2–1.2)
BUN SERPL-MCNC: 20 MG/DL — SIGNIFICANT CHANGE UP (ref 10–20)
BUN SERPL-MCNC: 23 MG/DL — HIGH (ref 10–20)
CALCIUM SERPL-MCNC: 8.3 MG/DL — LOW (ref 8.5–10.1)
CALCIUM SERPL-MCNC: 8.5 MG/DL — SIGNIFICANT CHANGE UP (ref 8.5–10.1)
CHLORIDE SERPL-SCNC: 101 MMOL/L — SIGNIFICANT CHANGE UP (ref 98–110)
CHLORIDE SERPL-SCNC: 102 MMOL/L — SIGNIFICANT CHANGE UP (ref 98–110)
CO2 SERPL-SCNC: 36 MMOL/L — HIGH (ref 17–32)
CO2 SERPL-SCNC: 36 MMOL/L — HIGH (ref 17–32)
CREAT SERPL-MCNC: 0.6 MG/DL — LOW (ref 0.7–1.5)
CREAT SERPL-MCNC: 0.6 MG/DL — LOW (ref 0.7–1.5)
CULTURE RESULTS: NO GROWTH — SIGNIFICANT CHANGE UP
CULTURE RESULTS: SIGNIFICANT CHANGE UP
EOSINOPHIL # BLD AUTO: 0 K/UL — SIGNIFICANT CHANGE UP (ref 0–0.7)
EOSINOPHIL NFR BLD AUTO: 0 % — SIGNIFICANT CHANGE UP (ref 0–8)
GLUCOSE BLDC GLUCOMTR-MCNC: 104 MG/DL — HIGH (ref 70–99)
GLUCOSE BLDC GLUCOMTR-MCNC: 164 MG/DL — HIGH (ref 70–99)
GLUCOSE BLDC GLUCOMTR-MCNC: 169 MG/DL — HIGH (ref 70–99)
GLUCOSE BLDC GLUCOMTR-MCNC: 206 MG/DL — HIGH (ref 70–99)
GLUCOSE SERPL-MCNC: 183 MG/DL — HIGH (ref 70–99)
GLUCOSE SERPL-MCNC: 193 MG/DL — HIGH (ref 70–99)
HCT VFR BLD CALC: 46 % — SIGNIFICANT CHANGE UP (ref 42–52)
HGB BLD-MCNC: 15.4 G/DL — SIGNIFICANT CHANGE UP (ref 14–18)
IMM GRANULOCYTES NFR BLD AUTO: 0.2 % — SIGNIFICANT CHANGE UP (ref 0.1–0.3)
LYMPHOCYTES # BLD AUTO: 0.83 K/UL — LOW (ref 1.2–3.4)
LYMPHOCYTES # BLD AUTO: 6.9 % — LOW (ref 20.5–51.1)
MAGNESIUM SERPL-MCNC: 2.3 MG/DL — SIGNIFICANT CHANGE UP (ref 1.8–2.4)
MAGNESIUM SERPL-MCNC: 2.4 MG/DL — SIGNIFICANT CHANGE UP (ref 1.8–2.4)
MCHC RBC-ENTMCNC: 30.6 PG — SIGNIFICANT CHANGE UP (ref 27–31)
MCHC RBC-ENTMCNC: 33.5 G/DL — SIGNIFICANT CHANGE UP (ref 32–37)
MCV RBC AUTO: 91.5 FL — SIGNIFICANT CHANGE UP (ref 80–94)
MONOCYTES # BLD AUTO: 0.91 K/UL — HIGH (ref 0.1–0.6)
MONOCYTES NFR BLD AUTO: 7.5 % — SIGNIFICANT CHANGE UP (ref 1.7–9.3)
NEUTROPHILS # BLD AUTO: 10.27 K/UL — HIGH (ref 1.4–6.5)
NEUTROPHILS NFR BLD AUTO: 85.2 % — HIGH (ref 42.2–75.2)
PHOSPHATE SERPL-MCNC: 3.6 MG/DL — SIGNIFICANT CHANGE UP (ref 2.1–4.9)
PLATELET # BLD AUTO: 222 K/UL — SIGNIFICANT CHANGE UP (ref 130–400)
POTASSIUM SERPL-MCNC: 4.2 MMOL/L — SIGNIFICANT CHANGE UP (ref 3.5–5)
POTASSIUM SERPL-MCNC: 4.3 MMOL/L — SIGNIFICANT CHANGE UP (ref 3.5–5)
POTASSIUM SERPL-SCNC: 4.2 MMOL/L — SIGNIFICANT CHANGE UP (ref 3.5–5)
POTASSIUM SERPL-SCNC: 4.3 MMOL/L — SIGNIFICANT CHANGE UP (ref 3.5–5)
PROT SERPL-MCNC: 6.3 G/DL — SIGNIFICANT CHANGE UP (ref 6–8)
RBC # BLD: 5.03 M/UL — SIGNIFICANT CHANGE UP (ref 4.7–6.1)
RBC # FLD: 13.6 % — SIGNIFICANT CHANGE UP (ref 11.5–14.5)
SODIUM SERPL-SCNC: 143 MMOL/L — SIGNIFICANT CHANGE UP (ref 135–146)
SODIUM SERPL-SCNC: 145 MMOL/L — SIGNIFICANT CHANGE UP (ref 135–146)
SPECIMEN SOURCE: SIGNIFICANT CHANGE UP
SPECIMEN SOURCE: SIGNIFICANT CHANGE UP
TROPONIN T SERPL-MCNC: <0.01 NG/ML — SIGNIFICANT CHANGE UP
TSH SERPL-MCNC: 0.44 UIU/ML — SIGNIFICANT CHANGE UP (ref 0.27–4.2)
WBC # BLD: 12.06 K/UL — HIGH (ref 4.8–10.8)
WBC # FLD AUTO: 12.06 K/UL — HIGH (ref 4.8–10.8)

## 2018-12-11 RX ORDER — DILTIAZEM HCL 120 MG
5 CAPSULE, EXT RELEASE 24 HR ORAL
Qty: 125 | Refills: 0 | Status: DISCONTINUED | OUTPATIENT
Start: 2018-12-11 | End: 2018-12-11

## 2018-12-11 RX ORDER — PROPOFOL 10 MG/ML
10 INJECTION, EMULSION INTRAVENOUS
Qty: 1000 | Refills: 0 | Status: DISCONTINUED | OUTPATIENT
Start: 2018-12-11 | End: 2018-12-20

## 2018-12-11 RX ORDER — AMIODARONE HYDROCHLORIDE 400 MG/1
1 TABLET ORAL
Qty: 900 | Refills: 0 | Status: DISCONTINUED | OUTPATIENT
Start: 2018-12-11 | End: 2018-12-12

## 2018-12-11 RX ORDER — METOPROLOL TARTRATE 50 MG
5 TABLET ORAL ONCE
Qty: 0 | Refills: 0 | Status: COMPLETED | OUTPATIENT
Start: 2018-12-11 | End: 2018-12-11

## 2018-12-11 RX ORDER — AMIODARONE HYDROCHLORIDE 400 MG/1
150 TABLET ORAL ONCE
Qty: 0 | Refills: 0 | Status: COMPLETED | OUTPATIENT
Start: 2018-12-11 | End: 2018-12-11

## 2018-12-11 RX ORDER — AMIODARONE HYDROCHLORIDE 400 MG/1
0.5 TABLET ORAL
Qty: 900 | Refills: 0 | Status: DISCONTINUED | OUTPATIENT
Start: 2018-12-11 | End: 2018-12-12

## 2018-12-11 RX ORDER — FUROSEMIDE 40 MG
20 TABLET ORAL ONCE
Qty: 0 | Refills: 0 | Status: COMPLETED | OUTPATIENT
Start: 2018-12-11 | End: 2018-12-11

## 2018-12-11 RX ADMIN — Medication 300 MILLIGRAM(S): at 17:17

## 2018-12-11 RX ADMIN — Medication 5 MG/HR: at 01:00

## 2018-12-11 RX ADMIN — MEROPENEM 100 MILLIGRAM(S): 1 INJECTION INTRAVENOUS at 09:10

## 2018-12-11 RX ADMIN — Medication 300 MILLIGRAM(S): at 05:52

## 2018-12-11 RX ADMIN — CHLORHEXIDINE GLUCONATE 1 APPLICATION(S): 213 SOLUTION TOPICAL at 01:30

## 2018-12-11 RX ADMIN — Medication 50 MILLIGRAM(S): at 05:52

## 2018-12-11 RX ADMIN — CHLORHEXIDINE GLUCONATE 15 MILLILITER(S): 213 SOLUTION TOPICAL at 17:15

## 2018-12-11 RX ADMIN — AMIODARONE HYDROCHLORIDE 618 MILLIGRAM(S): 400 TABLET ORAL at 08:47

## 2018-12-11 RX ADMIN — MEROPENEM 100 MILLIGRAM(S): 1 INJECTION INTRAVENOUS at 17:12

## 2018-12-11 RX ADMIN — Medication 650 MILLIGRAM(S): at 10:24

## 2018-12-11 RX ADMIN — Medication 20 MILLIGRAM(S): at 11:48

## 2018-12-11 RX ADMIN — Medication 5 MILLIGRAM(S): at 03:40

## 2018-12-11 RX ADMIN — Medication 650 MILLIGRAM(S): at 11:46

## 2018-12-11 RX ADMIN — PANTOPRAZOLE SODIUM 40 MILLIGRAM(S): 20 TABLET, DELAYED RELEASE ORAL at 11:47

## 2018-12-11 RX ADMIN — CHLORHEXIDINE GLUCONATE 15 MILLILITER(S): 213 SOLUTION TOPICAL at 05:52

## 2018-12-11 RX ADMIN — MEROPENEM 100 MILLIGRAM(S): 1 INJECTION INTRAVENOUS at 21:51

## 2018-12-11 RX ADMIN — ARIPIPRAZOLE 20 MILLIGRAM(S): 15 TABLET ORAL at 11:47

## 2018-12-11 RX ADMIN — INSULIN HUMAN 2 UNIT(S)/HR: 100 INJECTION, SOLUTION SUBCUTANEOUS at 01:41

## 2018-12-11 RX ADMIN — PROPOFOL 5.8 MICROGRAM(S)/KG/MIN: 10 INJECTION, EMULSION INTRAVENOUS at 23:13

## 2018-12-11 RX ADMIN — ENOXAPARIN SODIUM 40 MILLIGRAM(S): 100 INJECTION SUBCUTANEOUS at 21:51

## 2018-12-11 NOTE — PROGRESS NOTE ADULT - ASSESSMENT
IMPRESSION:  Acute on chronic hypercapnic respiratory failure on MV  Septic shock secondary to aspiration PNA  Pulmonary edema secondary to HfrEF  HO Schizophrenia  Smoker    PLAN:    CNS: Hold sedation to assess mental status; If no improvement in afternoon CT-H;     HEENT: Oral care    PULMONARY: HOB at 45 degrees; c/w prednisone; Vent changes:  RR 24  increase PEEP 8; Taper Fio2 to 60%     CARDIOVASCULAR: Keep I<O; Decrease Lasix IV to 20mg x1; TTE;     GI: GI prophylaxis.  Feeding as tolerated;     RENAL:  FU lytes;     INFECTIOUS DISEASE: Panculture; MRSA nasal swab; Start Meropenem and Vanco; If MRSA nasal negative d/c vanco;     HEMATOLOGICAL:  DVT prophylaxis.    ENDOCRINE:  Follow up FS.  Insulin protocol if needed.    MICU monitoring IMPRESSION:  Acute on chronic hypercapnic respiratory failure on MV  Septic shock secondary to aspiration PNA  Pulmonary edema secondary to HfrEF  HO Schizophrenia  Smoker    PLAN:    CNS: Hold sedation to assess mental status; If no improvement in afternoon CT-H;     HEENT: Oral care    PULMONARY: HOB at 45 degrees; c/w prednisone; Vent changes:  RR 24  increase PEEP 8; Taper Fio2 to 60%; Advance ETT 1 cm    CARDIOVASCULAR: Keep I<O; Decrease Lasix IV to 20mg x1; TTE;     GI: GI prophylaxis.  Feeding as tolerated;     RENAL:  FU lytes;     INFECTIOUS DISEASE: Panculture; MRSA nasal swab; Start Meropenem and Vanco; If MRSA nasal negative d/c vanco;     HEMATOLOGICAL:  DVT prophylaxis.    ENDOCRINE:  Follow up FS.  Insulin protocol if needed.    MICU monitoring

## 2018-12-11 NOTE — PROGRESS NOTE ADULT - SUBJECTIVE AND OBJECTIVE BOX
Patient is a 58y old  Male who presents with a chief complaint of Septic Shock (11 Dec 2018 07:20)      OVER NIGHT EVENTS:  Febrile; Episode of Afib;   Off pressors    PHYSICAL EXAM    ICU Vital Signs Last 24 Hrs  T(C): 38.4 (11 Dec 2018 08:00), Max: 39.1 (10 Dec 2018 16:09)  T(F): 101.1 (11 Dec 2018 08:00), Max: 102.4 (10 Dec 2018 16:09)  HR: 114 (11 Dec 2018 07:54) (78 - 138)  BP: 148/80 (11 Dec 2018 06:00) (93/55 - 174/94)  BP(mean): 107 (11 Dec 2018 06:00) (74 - 112)  RR: 27 (11 Dec 2018 07:05) (22 - 30)  SpO2: 99% (11 Dec 2018 07:54) (95% - 100%)    I&O's Detail    10 Dec 2018 07:01  -  11 Dec 2018 07:00  --------------------------------------------------------  IN:    diltiazem Infusion: 35 mL    Enteral Tube Flush: 50 mL    fentaNYL  Infusion: 30 mL    Glucerna: 240 mL    insulin Infusion: 57 mL    IV PiggyBack: 334 mL    midazolam Infusion: 24 mL    norepinephrine Infusion: 285 mL    Solution: 50 mL    Solution: 1000 mL    Vital High Protein: 1080 mL  Total IN: 3185 mL    OUT:    Indwelling Catheter - Urethral: 2617 mL  Total OUT: 2617 mL    Total NET: 568 mL      11 Dec 2018 07:01  -  11 Dec 2018 09:29  --------------------------------------------------------  IN:  Total IN: 0 mL    OUT:    Indwelling Catheter - Urethral: 60 mL  Total OUT: 60 mL    Total NET: -60 mL    General: Comfortable in bed  HEENT:  ET +; PERRLA  Lymph node: No palpable LN             Lungs: DEVYN over bases; Bilateral crackles  Cardiovascular: RRR, S1S2  Abdomen: BS+ve; soft non tender  Extremities: No LE edema  Skin:  No evident Rash  Neurological:  Sedated    LABS:                          15.4   12.06 )-----------( 222      ( 11 Dec 2018 05:51 )             46.0       145  |  101  |  23<H>  ----------------------------<  183<H>  4.3   |  36<H>  |  0.6<L>    Ca    8.5      11 Dec 2018 05:51  Phos  3.6       Mg     2.4         TPro  6.3  /  Alb  3.7  /  TBili  0.5  /  DBili  x   /  AST  21  /  ALT  37  /  AlkPhos  119<H>      Urinalysis Basic - ( 10 Dec 2018 14:40 )    Color: Yellow / Appearance: Clear / S.025 / pH: x  Gluc: x / Ketone: Negative  / Bili: Negative / Urobili: 0.2 mg/dL   Blood: x / Protein: Negative mg/dL / Nitrite: Negative   Leuk Esterase: Trace / RBC: 1-2 /HPF / WBC 1-2 /HPF   Sq Epi: x / Non Sq Epi: x / Bacteria: Few    CARDIAC MARKERS ( 11 Dec 2018 06:00 )  x     / <0.01 ng/mL / x     / x     / x      CARDIAC MARKERS ( 10 Dec 2018 12:50 )  x     / 0.02 ng/mL / 394 U/L / x     / 3.2 ng/mL    LIVER FUNCTIONS - ( 11 Dec 2018 05:51 )  Alb: 3.7 g/dL / Pro: 6.3 g/dL / ALK PHOS: 119 U/L / ALT: 37 U/L / AST: 21 U/L / GGT: x                                            Lactate (18 @ 06:48): 0.7  Lactate (18 @ 21:54): 1.4    Culture - Sputum (collected 10 Dec 2018 16:47)  Source: .Sputum Sputum  Gram Stain (10 Dec 2018 23:29):    Moderate polymorphonuclear leukocytes per low power field    No Squamous epithelial cells per low power field    No organisms seen    Culture - Blood (collected 09 Dec 2018 06:48)  Source: .Blood None  Preliminary Report (10 Dec 2018 18:01):    No growth to date.    Culture - Urine (collected 09 Dec 2018 04:15)  Source: .Urine Clean Catch (Midstream)  Final Report (10 Dec 2018 21:53):    No growth    Culture - Sputum (collected 09 Dec 2018 04:15)  Source: .Sputum Sputum  Gram Stain (09 Dec 2018 22:53):    Numerous polymorphonuclear leukocytes per low power field    No Squamous epithelial cells per low power field    Rare Gram Positive Cocci in Clusters per oil power field    Rare Gram positive cocci in pairs per oil power field    Rare Gram Negative Rods per oil power field  Preliminary Report (10 Dec 2018 17:57):    Few Normal Respiratory Carli present                                              Mode: Auto Mode: PRVC/ Volume Support  RR (machine): 22  TV (machine): 450  FiO2: 80  PEEP: 5  MAP: 16  PIP: 25                                        ABG - ( 11 Dec 2018 06:41 )  pH, Arterial: 7.29  pH, Blood: x     /  pCO2: 72    /  pO2: 110   / HCO3: 36    / Base Excess: 5.3   /  SaO2: 97        MEDICATIONS  (STANDING):  amiodarone Infusion 1 mG/Min (33.333 mL/Hr) IV Continuous <Continuous>  amiodarone Infusion 0.5 mG/Min (16.667 mL/Hr) IV Continuous <Continuous>  ARIPiprazole 20 milliGRAM(s) Oral daily  chlorhexidine 0.12% Liquid 15 milliLiter(s) Oral Mucosa two times a day  chlorhexidine 4% Liquid 1 Application(s) Topical <User Schedule>  dextrose 50% Injectable 50 milliLiter(s) IV Push every 15 minutes  dextrose 50% Injectable 25 milliLiter(s) IV Push every 15 minutes  diltiazem Infusion 5 mG/Hr (5 mL/Hr) IV Continuous <Continuous>  enoxaparin Injectable 40 milliGRAM(s) SubCutaneous every 24 hours  fentaNYL   Infusion 0.5 MICROgram(s)/kG/Hr (4.99 mL/Hr) IV Continuous <Continuous>  insulin Infusion 2 Unit(s)/Hr (2 mL/Hr) IV Continuous <Continuous>  meropenem  IVPB 1000 milliGRAM(s) IV Intermittent every 8 hours  meropenem  IVPB      midazolam Infusion 0.02 mG/kG/Hr (1.996 mL/Hr) IV Continuous <Continuous>  norepinephrine Infusion 0.05 MICROgram(s)/kG/Min (9.356 mL/Hr) IV Continuous <Continuous>  pantoprazole  Injectable 40 milliGRAM(s) IV Push daily  predniSONE   Tablet 50 milliGRAM(s) Oral daily  vancomycin  IVPB      vancomycin  IVPB 1500 milliGRAM(s) IV Intermittent every 12 hours    MEDICATIONS  (PRN):  acetaminophen    Suspension .. 650 milliGRAM(s) Enteral Tube every 6 hours PRN Temp greater or equal to 38C (100.4F)      Radiology:  Cxr: ETT ok; OG ok; Improved bilateral opacities     TTE: EF 30-35% Patient is a 58y old  Male who presents with a chief complaint of Septic Shock (11 Dec 2018 07:20)      OVER NIGHT EVENTS:  Febrile; Episode of Afib;   Off pressors    PHYSICAL EXAM    ICU Vital Signs Last 24 Hrs  T(C): 38.4 (11 Dec 2018 08:00), Max: 39.1 (10 Dec 2018 16:09)  T(F): 101.1 (11 Dec 2018 08:00), Max: 102.4 (10 Dec 2018 16:09)  HR: 114 (11 Dec 2018 07:54) (78 - 138)  BP: 148/80 (11 Dec 2018 06:00) (93/55 - 174/94)  BP(mean): 107 (11 Dec 2018 06:00) (74 - 112)  RR: 27 (11 Dec 2018 07:05) (22 - 30)  SpO2: 99% (11 Dec 2018 07:54) (95% - 100%)    I&O's Detail    10 Dec 2018 07:01  -  11 Dec 2018 07:00  --------------------------------------------------------  IN:    diltiazem Infusion: 35 mL    Enteral Tube Flush: 50 mL    fentaNYL  Infusion: 30 mL    Glucerna: 240 mL    insulin Infusion: 57 mL    IV PiggyBack: 334 mL    midazolam Infusion: 24 mL    norepinephrine Infusion: 285 mL    Solution: 50 mL    Solution: 1000 mL    Vital High Protein: 1080 mL  Total IN: 3185 mL    OUT:    Indwelling Catheter - Urethral: 2617 mL  Total OUT: 2617 mL    Total NET: 568 mL      11 Dec 2018 07:01  -  11 Dec 2018 09:29  --------------------------------------------------------  IN:  Total IN: 0 mL    OUT:    Indwelling Catheter - Urethral: 60 mL  Total OUT: 60 mL    Total NET: -60 mL    General: Comfortable in bed  HEENT:  ET +; PERRLA  Lymph node: No palpable LN             Lungs: DEVYN over bases; Bilateral crackles  Cardiovascular: RRR, S1S2  Abdomen: BS+ve; soft non tender  Extremities: No LE edema  Skin:  No evident Rash  Neurological:  Sedated    LABS:                          15.4   12.06 )-----------( 222      ( 11 Dec 2018 05:51 )             46.0       145  |  101  |  23<H>  ----------------------------<  183<H>  4.3   |  36<H>  |  0.6<L>    Ca    8.5      11 Dec 2018 05:51  Phos  3.6       Mg     2.4         TPro  6.3  /  Alb  3.7  /  TBili  0.5  /  DBili  x   /  AST  21  /  ALT  37  /  AlkPhos  119<H>      Urinalysis Basic - ( 10 Dec 2018 14:40 )    Color: Yellow / Appearance: Clear / S.025 / pH: x  Gluc: x / Ketone: Negative  / Bili: Negative / Urobili: 0.2 mg/dL   Blood: x / Protein: Negative mg/dL / Nitrite: Negative   Leuk Esterase: Trace / RBC: 1-2 /HPF / WBC 1-2 /HPF   Sq Epi: x / Non Sq Epi: x / Bacteria: Few    CARDIAC MARKERS ( 11 Dec 2018 06:00 )  x     / <0.01 ng/mL / x     / x     / x      CARDIAC MARKERS ( 10 Dec 2018 12:50 )  x     / 0.02 ng/mL / 394 U/L / x     / 3.2 ng/mL    LIVER FUNCTIONS - ( 11 Dec 2018 05:51 )  Alb: 3.7 g/dL / Pro: 6.3 g/dL / ALK PHOS: 119 U/L / ALT: 37 U/L / AST: 21 U/L / GGT: x                                            Lactate (18 @ 06:48): 0.7  Lactate (18 @ 21:54): 1.4    Culture - Sputum (collected 10 Dec 2018 16:47)  Source: .Sputum Sputum  Gram Stain (10 Dec 2018 23:29):    Moderate polymorphonuclear leukocytes per low power field    No Squamous epithelial cells per low power field    No organisms seen    Culture - Blood (collected 09 Dec 2018 06:48)  Source: .Blood None  Preliminary Report (10 Dec 2018 18:01):    No growth to date.    Culture - Urine (collected 09 Dec 2018 04:15)  Source: .Urine Clean Catch (Midstream)  Final Report (10 Dec 2018 21:53):    No growth    Culture - Sputum (collected 09 Dec 2018 04:15)  Source: .Sputum Sputum  Gram Stain (09 Dec 2018 22:53):    Numerous polymorphonuclear leukocytes per low power field    No Squamous epithelial cells per low power field    Rare Gram Positive Cocci in Clusters per oil power field    Rare Gram positive cocci in pairs per oil power field    Rare Gram Negative Rods per oil power field  Preliminary Report (10 Dec 2018 17:57):    Few Normal Respiratory Carli present                                              Mode: Auto Mode: PRVC/ Volume Support  RR (machine): 22  TV (machine): 450  FiO2: 80  PEEP: 5  MAP: 16  PIP: 25                                        ABG - ( 11 Dec 2018 06:41 )  pH, Arterial: 7.29  pH, Blood: x     /  pCO2: 72    /  pO2: 110   / HCO3: 36    / Base Excess: 5.3   /  SaO2: 97        MEDICATIONS  (STANDING):  amiodarone Infusion 1 mG/Min (33.333 mL/Hr) IV Continuous <Continuous>  amiodarone Infusion 0.5 mG/Min (16.667 mL/Hr) IV Continuous <Continuous>  ARIPiprazole 20 milliGRAM(s) Oral daily  chlorhexidine 0.12% Liquid 15 milliLiter(s) Oral Mucosa two times a day  chlorhexidine 4% Liquid 1 Application(s) Topical <User Schedule>  dextrose 50% Injectable 50 milliLiter(s) IV Push every 15 minutes  dextrose 50% Injectable 25 milliLiter(s) IV Push every 15 minutes  diltiazem Infusion 5 mG/Hr (5 mL/Hr) IV Continuous <Continuous>  enoxaparin Injectable 40 milliGRAM(s) SubCutaneous every 24 hours  fentaNYL   Infusion 0.5 MICROgram(s)/kG/Hr (4.99 mL/Hr) IV Continuous <Continuous>  insulin Infusion 2 Unit(s)/Hr (2 mL/Hr) IV Continuous <Continuous>  meropenem  IVPB 1000 milliGRAM(s) IV Intermittent every 8 hours  meropenem  IVPB      midazolam Infusion 0.02 mG/kG/Hr (1.996 mL/Hr) IV Continuous <Continuous>  norepinephrine Infusion 0.05 MICROgram(s)/kG/Min (9.356 mL/Hr) IV Continuous <Continuous>  pantoprazole  Injectable 40 milliGRAM(s) IV Push daily  predniSONE   Tablet 50 milliGRAM(s) Oral daily  vancomycin  IVPB      vancomycin  IVPB 1500 milliGRAM(s) IV Intermittent every 12 hours    MEDICATIONS  (PRN):  acetaminophen    Suspension .. 650 milliGRAM(s) Enteral Tube every 6 hours PRN Temp greater or equal to 38C (100.4F)      Radiology:  Cxr: ETT 6 cm from kim; OG ok; Improved bilateral opacities     TTE: EF 30-35%

## 2018-12-11 NOTE — PROGRESS NOTE ADULT - ASSESSMENT
Patient mildly sedated on vent. Was not responsive off sedation. Check cultures . Echo EF 30 -35 %. Will need beta ace or arb when stable. Afib. Will start amiodarone. Heparin if candidate.

## 2018-12-11 NOTE — PROGRESS NOTE ADULT - SUBJECTIVE AND OBJECTIVE BOX
Patient is a 58y old  Male who presents with a chief complaint of Septic Shock (10 Dec 2018 12:37)      T(F): 100.7 (12-11-18 @ 06:00), Max: 102.4 (12-10-18 @ 09:00)  HR: 120 (12-11-18 @ 06:00)  BP: 148/80 (12-11-18 @ 06:00)  RR: 27 (12-11-18 @ 06:00)  SpO2: 99% (12-11-18 @ 06:00) (95% - 100%)    PHYSICAL EXAM:  GENERAL: NAD, well-groomed, well-developed  HEAD:  Atraumatic, Normocephalic  EYES: EOMI, PERRLA, conjunctiva and sclera clear  ENMT: No tonsillar erythema, exudates, or enlargement; Moist mucous membranes, Good dentition, No lesions  NECK: Supple, No JVD, Normal thyroid  NERVOUS SYSTEM:  Alert & Oriented X3,  Motor Strength 5/5 B/L upper and lower extremities  CHEST/LUNG: Clear to percussion bilaterally; No rales, rhonchi, wheezing, or rubs  HEART: Regular rate and rhythm; No murmurs, rubs, or gallops  ABDOMEN: Soft, Nontender, Nondistended; Bowel sounds present  EXTREMITIES:   No clubbing, cyanosis, or edema  LYMPH: No lymphadenopathy noted  SKIN: No rashes or lesions    labs  12-11    143  |  102  |  20  ----------------------------<  193<H>  4.2   |  36<H>  |  0.6<L>    Ca    8.3<L>      11 Dec 2018 01:00  Mg     2.3     12-11    TPro  5.5<L>  /  Alb  3.3<L>  /  TBili  0.7  /  DBili  x   /  AST  23  /  ALT  44<H>  /  AlkPhos  92  12-10                          15.4   12.06 )-----------( 222      ( 11 Dec 2018 05:51 )             46.0       Culture - Sputum (collected 10 Dec 2018 16:47)  Source: .Sputum Sputum  Gram Stain (10 Dec 2018 23:29):    Moderate polymorphonuclear leukocytes per low power field    No Squamous epithelial cells per low power field    No organisms seen    Culture - Blood (collected 09 Dec 2018 06:48)  Source: .Blood None  Preliminary Report (10 Dec 2018 18:01):    No growth to date.    Culture - Urine (collected 09 Dec 2018 04:15)  Source: .Urine Clean Catch (Midstream)  Final Report (10 Dec 2018 21:53):    No growth    Culture - Sputum (collected 09 Dec 2018 04:15)  Source: .Sputum Sputum  Gram Stain (09 Dec 2018 22:53):    Numerous polymorphonuclear leukocytes per low power field    No Squamous epithelial cells per low power field    Rare Gram Positive Cocci in Clusters per oil power field    Rare Gram positive cocci in pairs per oil power field    Rare Gram Negative Rods per oil power field  Preliminary Report (10 Dec 2018 17:57):    Few Normal Respiratory Carli present        Mode: Auto Mode: PRVC/ Volume Support  RR (machine): 22  TV (machine): 450  FiO2: 80  PEEP: 10  MAP: 15  PIP: 25    Troponin T, Serum: <0.01 ng/mL (12-11-18 @ 06:00)  Creatine Kinase, Serum: 394 U/L <H> (12-10-18 @ 12:50)  Troponin T, Serum: 0.02 ng/mL <H> (12-10-18 @ 12:50)      acetaminophen    Suspension .. 650 milliGRAM(s) Enteral Tube every 6 hours PRN  ARIPiprazole 20 milliGRAM(s) Oral daily  chlorhexidine 0.12% Liquid 15 milliLiter(s) Oral Mucosa two times a day  chlorhexidine 4% Liquid 1 Application(s) Topical <User Schedule>  dextrose 50% Injectable 50 milliLiter(s) IV Push every 15 minutes  dextrose 50% Injectable 25 milliLiter(s) IV Push every 15 minutes  diltiazem Infusion 5 mG/Hr IV Continuous <Continuous>  enoxaparin Injectable 40 milliGRAM(s) SubCutaneous every 24 hours  fentaNYL   Infusion 0.5 MICROgram(s)/kG/Hr IV Continuous <Continuous>  insulin Infusion 2 Unit(s)/Hr IV Continuous <Continuous>  meropenem  IVPB 1000 milliGRAM(s) IV Intermittent every 8 hours  meropenem  IVPB      midazolam Infusion 0.02 mG/kG/Hr IV Continuous <Continuous>  norepinephrine Infusion 0.05 MICROgram(s)/kG/Min IV Continuous <Continuous>  pantoprazole  Injectable 40 milliGRAM(s) IV Push daily  predniSONE   Tablet 50 milliGRAM(s) Oral daily  vancomycin  IVPB      vancomycin  IVPB 1500 milliGRAM(s) IV Intermittent every 12 hours

## 2018-12-11 NOTE — PROGRESS NOTE ADULT - SUBJECTIVE AND OBJECTIVE BOX
SUBJECTIVE:    Patient is a 58y old Male who presents with a chief complaint of Septic Shock (11 Dec 2018 09:29)    Currently admitted to medicine with the primary diagnosis of Acute respiratory failure with hypoxia and hypercapnia     Today is hospital day 3d. Overnight temp 100.7F.  Remains intubated.    PAST MEDICAL & SURGICAL HISTORY  ETOH abuse  HLD (hyperlipidemia)  Hypertension, unspecified type  COPD (chronic obstructive pulmonary disease)  Schizophrenia  Psych & behavrl factors assoc w disord or dis classd elswhr  Diabetes  No significant past surgical history    SOCIAL HISTORY:  Negative for smoking/alcohol/drug use.     ALLERGIES:  No Known Allergies    MEDICATIONS:  STANDING MEDICATIONS  amiodarone Infusion 1 mG/Min IV Continuous <Continuous>  amiodarone Infusion 0.5 mG/Min IV Continuous <Continuous>  ARIPiprazole 20 milliGRAM(s) Oral daily  chlorhexidine 0.12% Liquid 15 milliLiter(s) Oral Mucosa two times a day  chlorhexidine 4% Liquid 1 Application(s) Topical <User Schedule>  dextrose 50% Injectable 50 milliLiter(s) IV Push every 15 minutes  dextrose 50% Injectable 25 milliLiter(s) IV Push every 15 minutes  enoxaparin Injectable 40 milliGRAM(s) SubCutaneous every 24 hours  fentaNYL   Infusion 0.5 MICROgram(s)/kG/Hr IV Continuous <Continuous>  insulin Infusion 2 Unit(s)/Hr IV Continuous <Continuous>  meropenem  IVPB 1000 milliGRAM(s) IV Intermittent every 8 hours  meropenem  IVPB      midazolam Infusion 0.02 mG/kG/Hr IV Continuous <Continuous>  norepinephrine Infusion 0.05 MICROgram(s)/kG/Min IV Continuous <Continuous>  pantoprazole  Injectable 40 milliGRAM(s) IV Push daily  predniSONE   Tablet 50 milliGRAM(s) Oral daily  vancomycin  IVPB      vancomycin  IVPB 1500 milliGRAM(s) IV Intermittent every 12 hours    PRN MEDICATIONS  acetaminophen    Suspension .. 650 milliGRAM(s) Enteral Tube every 6 hours PRN    VITALS:   ICU Vital Signs Last 24 Hrs  T(C): 38.2 (11 Dec 2018 11:01), Max: 39.1 (10 Dec 2018 16:09)  T(F): 100.8 (11 Dec 2018 11:01), Max: 102.4 (10 Dec 2018 16:09)  HR: 84 (11 Dec 2018 12:01) (78 - 162)  BP: 98/56 (11 Dec 2018 11:31) (93/55 - 174/94)  BP(mean): 71 (11 Dec 2018 11:31) (71 - 112)  ABP: --  ABP(mean): --  RR: 24 (11 Dec 2018 12:01) (22 - 32)  SpO2: 97% (11 Dec 2018 12:01) (95% - 100%)      LABS:                        15.4   12.06 )-----------( 222      ( 11 Dec 2018 05:51 )             46.0         145  |  101  |  23<H>  ----------------------------<  183<H>  4.3   |  36<H>  |  0.6<L>    Ca    8.5      11 Dec 2018 05:51  Phos  3.6       Mg     2.4         TPro  6.3  /  Alb  3.7  /  TBili  0.5  /  DBili  x   /  AST  21  /  ALT  37  /  AlkPhos  119<H>        Urinalysis Basic - ( 10 Dec 2018 14:40 )    Color: Yellow / Appearance: Clear / S.025 / pH: x  Gluc: x / Ketone: Negative  / Bili: Negative / Urobili: 0.2 mg/dL   Blood: x / Protein: Negative mg/dL / Nitrite: Negative   Leuk Esterase: Trace / RBC: 1-2 /HPF / WBC 1-2 /HPF   Sq Epi: x / Non Sq Epi: x / Bacteria: Few      ABG - ( 11 Dec 2018 11:40 )  pH, Arterial: 7.44  pH, Blood: x     /  pCO2: 51    /  pO2: 90    / HCO3: 35    / Base Excess: 9.1   /  SaO2: 96                Troponin T, Serum: <0.01 ng/mL (18 @ 06:00)  Creatine Kinase, Serum: 394 U/L <H> (12-10-18 @ 12:50)  Troponin T, Serum: 0.02 ng/mL <H> (12-10-18 @ 12:50)      Culture - Sputum (collected 10 Dec 2018 16:47)  Source: .Sputum Sputum  Gram Stain (10 Dec 2018 23:29):    Moderate polymorphonuclear leukocytes per low power field    No Squamous epithelial cells per low power field    No organisms seen    Culture - Blood (collected 09 Dec 2018 06:48)  Source: .Blood None  Preliminary Report (10 Dec 2018 18:01):    No growth to date.    Culture - Urine (collected 09 Dec 2018 04:15)  Source: .Urine Clean Catch (Midstream)  Final Report (10 Dec 2018 21:53):    No growth    Culture - Sputum (collected 09 Dec 2018 04:15)  Source: .Sputum Sputum  Gram Stain (09 Dec 2018 22:53):    Numerous polymorphonuclear leukocytes per low power field    No Squamous epithelial cells per low power field    Rare Gram Positive Cocci in Clusters per oil power field    Rare Gram positive cocci in pairs per oil power field    Rare Gram Negative Rods per oil power field  Preliminary Report (10 Dec 2018 17:57):    Few Normal Respiratory Carli present      CARDIAC MARKERS ( 11 Dec 2018 06:00 )  x     / <0.01 ng/mL / x     / x     / x      CARDIAC MARKERS ( 10 Dec 2018 12:50 )  x     / 0.02 ng/mL / 394 U/L / x     / 3.2 ng/mL      RADIOLOGY:    PHYSICAL EXAM:  GEN: intubated  LUNGS: vent sounds detected b/l, no wheeze, no crackles  HEART: S1/S2 present. irregularly irregular  ABD: Soft, non-tender, non-distended. Bowel sounds present  EXT: no LE edema  NEURO: not alert, pinpoint pupils

## 2018-12-11 NOTE — PROGRESS NOTE ADULT - ASSESSMENT
58/M hx of DM & HTN & schizophrenia?  presented to ED with SOB with orthopnea. Became hypotensive in ED after IV NTG was given for elevated BP. Required intubation and pressor support. Admit to CCU for Acute respiratory failure from Septic Shock requiring intubation and pressor support. CXR concerning for LLL infiltrate - PNA? vs ARDS?    Acute on chronic hypercapnic respiratory failure on MV  Septic shock secondary to aspiration PNA  Pulmonary edema secondary to CHF  HO Schizophrenia  Smoker    Overnight - pt had fever 102F  [X] TLC  [X] Nichole  [X] intubated  [   ] Pressor    Problem List  Acute on chronic hypercapnic respiratory failure on MV  Septic shock secondary to aspiration PNA - off pressors   Pulmonary edema secondary to HFrEF  HO Schizophrenia  Smoker    - RVP negative  - UA - trace leuks; neg nitrates  - Bld cx -neg (12/9)  - CE 0.04>0.05>0.02      # Septic Shock due to aspiration PNA & Acute on chronic hypercapneic respiratory failure  - Still with fevers  - Intubated & Vented - A/C & PEEP; off sedation, pt not alert  - Off pressors  - Vanc & rex; Vanc trough ordered  - F/u Urine cx & MRSA nares  - Prednisone PO daily (12/10 start)   - Pt on high FiO2  - May consider CTH as pt not responding off sedation; though unstable requiring hi Fio2    # Pulmonary edema secondary to CHF   - TTE (12/10) - mod impaired function 30-35%, LV dilated, LV enlarged, mod MR, mild TR, LA dilation, small pericardial effusion, RV pressure 31  - IV lasix today x1  - Will need ACEI/ARB; hold for now     # New AF (CHADSVASC-3) now converted to sinus  - Amiodarone drip  - No A/C now    # DM - insulin infusion  # Hx of HTN - hold BP meds  # Schizophrenia - on ariprazole  # DVT/GI ppx     Full Code

## 2018-12-11 NOTE — PROVIDER CONTACT NOTE (CHANGE IN STATUS NOTIFICATION) - BACKGROUND
pt intubated sedation vacation still in progress pt still unarousable noted after bath pt went into rapid afib 's-150's

## 2018-12-12 LAB
ALBUMIN SERPL ELPH-MCNC: 3.1 G/DL — LOW (ref 3.5–5.2)
ALP SERPL-CCNC: 77 U/L — SIGNIFICANT CHANGE UP (ref 30–115)
ALT FLD-CCNC: 39 U/L — SIGNIFICANT CHANGE UP (ref 0–41)
ANION GAP SERPL CALC-SCNC: 8 MMOL/L — SIGNIFICANT CHANGE UP (ref 7–14)
APTT BLD: 67.2 SEC — HIGH (ref 27–39.2)
AST SERPL-CCNC: 27 U/L — SIGNIFICANT CHANGE UP (ref 0–41)
BASE EXCESS BLDA CALC-SCNC: 11 MMOL/L — HIGH (ref -2–2)
BASOPHILS # BLD AUTO: 0.02 K/UL — SIGNIFICANT CHANGE UP (ref 0–0.2)
BASOPHILS NFR BLD AUTO: 0.2 % — SIGNIFICANT CHANGE UP (ref 0–1)
BILIRUB SERPL-MCNC: 0.5 MG/DL — SIGNIFICANT CHANGE UP (ref 0.2–1.2)
BUN SERPL-MCNC: 34 MG/DL — HIGH (ref 10–20)
CALCIUM SERPL-MCNC: 8.4 MG/DL — LOW (ref 8.5–10.1)
CHLORIDE SERPL-SCNC: 102 MMOL/L — SIGNIFICANT CHANGE UP (ref 98–110)
CO2 SERPL-SCNC: 36 MMOL/L — HIGH (ref 17–32)
CREAT SERPL-MCNC: 0.6 MG/DL — LOW (ref 0.7–1.5)
CULTURE RESULTS: SIGNIFICANT CHANGE UP
EOSINOPHIL # BLD AUTO: 0.17 K/UL — SIGNIFICANT CHANGE UP (ref 0–0.7)
EOSINOPHIL NFR BLD AUTO: 1.7 % — SIGNIFICANT CHANGE UP (ref 0–8)
GLUCOSE BLDC GLUCOMTR-MCNC: 130 MG/DL — HIGH (ref 70–99)
GLUCOSE BLDC GLUCOMTR-MCNC: 158 MG/DL — HIGH (ref 70–99)
GLUCOSE BLDC GLUCOMTR-MCNC: 182 MG/DL — HIGH (ref 70–99)
GLUCOSE BLDC GLUCOMTR-MCNC: 192 MG/DL — HIGH (ref 70–99)
GLUCOSE BLDC GLUCOMTR-MCNC: 193 MG/DL — HIGH (ref 70–99)
GLUCOSE BLDC GLUCOMTR-MCNC: 195 MG/DL — HIGH (ref 70–99)
GLUCOSE BLDC GLUCOMTR-MCNC: 198 MG/DL — HIGH (ref 70–99)
GLUCOSE BLDC GLUCOMTR-MCNC: 203 MG/DL — HIGH (ref 70–99)
GLUCOSE BLDC GLUCOMTR-MCNC: 211 MG/DL — HIGH (ref 70–99)
GLUCOSE BLDC GLUCOMTR-MCNC: 217 MG/DL — HIGH (ref 70–99)
GLUCOSE BLDC GLUCOMTR-MCNC: 88 MG/DL — SIGNIFICANT CHANGE UP (ref 70–99)
GLUCOSE SERPL-MCNC: 193 MG/DL — HIGH (ref 70–99)
HCO3 BLDA-SCNC: 38 MMOL/L — HIGH (ref 23–27)
HCT VFR BLD CALC: 40.6 % — LOW (ref 42–52)
HCT VFR BLD CALC: 41.6 % — LOW (ref 42–52)
HGB BLD-MCNC: 13.2 G/DL — LOW (ref 14–18)
HGB BLD-MCNC: 13.5 G/DL — LOW (ref 14–18)
HOROWITZ INDEX BLDA+IHG-RTO: 60 — SIGNIFICANT CHANGE UP
IMM GRANULOCYTES NFR BLD AUTO: 0.3 % — SIGNIFICANT CHANGE UP (ref 0.1–0.3)
LYMPHOCYTES # BLD AUTO: 1.27 K/UL — SIGNIFICANT CHANGE UP (ref 1.2–3.4)
LYMPHOCYTES # BLD AUTO: 12.5 % — LOW (ref 20.5–51.1)
MAGNESIUM SERPL-MCNC: 2.5 MG/DL — HIGH (ref 1.8–2.4)
MCHC RBC-ENTMCNC: 30.1 PG — SIGNIFICANT CHANGE UP (ref 27–31)
MCHC RBC-ENTMCNC: 30.4 PG — SIGNIFICANT CHANGE UP (ref 27–31)
MCHC RBC-ENTMCNC: 32.5 G/DL — SIGNIFICANT CHANGE UP (ref 32–37)
MCHC RBC-ENTMCNC: 32.5 G/DL — SIGNIFICANT CHANGE UP (ref 32–37)
MCV RBC AUTO: 92.5 FL — SIGNIFICANT CHANGE UP (ref 80–94)
MCV RBC AUTO: 93.7 FL — SIGNIFICANT CHANGE UP (ref 80–94)
MONOCYTES # BLD AUTO: 0.65 K/UL — HIGH (ref 0.1–0.6)
MONOCYTES NFR BLD AUTO: 6.4 % — SIGNIFICANT CHANGE UP (ref 1.7–9.3)
MRSA PCR RESULT.: SIGNIFICANT CHANGE UP
NEUTROPHILS # BLD AUTO: 8.06 K/UL — HIGH (ref 1.4–6.5)
NEUTROPHILS NFR BLD AUTO: 78.9 % — HIGH (ref 42.2–75.2)
NRBC # BLD: 0 /100 WBCS — SIGNIFICANT CHANGE UP (ref 0–0)
PCO2 BLDA: 54 MMHG — HIGH (ref 38–42)
PH BLDA: 7.45 — HIGH (ref 7.38–7.42)
PHOSPHATE SERPL-MCNC: 2.6 MG/DL — SIGNIFICANT CHANGE UP (ref 2.1–4.9)
PLATELET # BLD AUTO: 220 K/UL — SIGNIFICANT CHANGE UP (ref 130–400)
PLATELET # BLD AUTO: 224 K/UL — SIGNIFICANT CHANGE UP (ref 130–400)
PO2 BLDA: 88 MMHG — SIGNIFICANT CHANGE UP (ref 78–95)
POTASSIUM SERPL-MCNC: 3.9 MMOL/L — SIGNIFICANT CHANGE UP (ref 3.5–5)
POTASSIUM SERPL-SCNC: 3.9 MMOL/L — SIGNIFICANT CHANGE UP (ref 3.5–5)
PROT SERPL-MCNC: 5.5 G/DL — LOW (ref 6–8)
RBC # BLD: 4.39 M/UL — LOW (ref 4.7–6.1)
RBC # BLD: 4.44 M/UL — LOW (ref 4.7–6.1)
RBC # FLD: 13.7 % — SIGNIFICANT CHANGE UP (ref 11.5–14.5)
RBC # FLD: 13.8 % — SIGNIFICANT CHANGE UP (ref 11.5–14.5)
S AUREUS DNA NOSE QL NAA+PROBE: SIGNIFICANT CHANGE UP
SAO2 % BLDA: 96 % — SIGNIFICANT CHANGE UP (ref 94–98)
SODIUM SERPL-SCNC: 146 MMOL/L — SIGNIFICANT CHANGE UP (ref 135–146)
SPECIMEN SOURCE: SIGNIFICANT CHANGE UP
VANCOMYCIN TROUGH SERPL-MCNC: 8.7 UG/ML — SIGNIFICANT CHANGE UP (ref 5–10)
VANCOMYCIN TROUGH SERPL-MCNC: 9.6 UG/ML — SIGNIFICANT CHANGE UP (ref 5–10)
WBC # BLD: 10.2 K/UL — SIGNIFICANT CHANGE UP (ref 4.8–10.8)
WBC # BLD: 9.68 K/UL — SIGNIFICANT CHANGE UP (ref 4.8–10.8)
WBC # FLD AUTO: 10.2 K/UL — SIGNIFICANT CHANGE UP (ref 4.8–10.8)
WBC # FLD AUTO: 9.68 K/UL — SIGNIFICANT CHANGE UP (ref 4.8–10.8)

## 2018-12-12 RX ORDER — AMIODARONE HYDROCHLORIDE 400 MG/1
200 TABLET ORAL DAILY
Qty: 0 | Refills: 0 | Status: DISCONTINUED | OUTPATIENT
Start: 2018-12-12 | End: 2018-12-22

## 2018-12-12 RX ORDER — ALBUTEROL 90 UG/1
2.5 AEROSOL, METERED ORAL EVERY 4 HOURS
Qty: 0 | Refills: 0 | Status: DISCONTINUED | OUTPATIENT
Start: 2018-12-12 | End: 2018-12-22

## 2018-12-12 RX ORDER — HEPARIN SODIUM 5000 [USP'U]/ML
INJECTION INTRAVENOUS; SUBCUTANEOUS
Qty: 25000 | Refills: 0 | Status: DISCONTINUED | OUTPATIENT
Start: 2018-12-12 | End: 2018-12-13

## 2018-12-12 RX ORDER — FUROSEMIDE 40 MG
40 TABLET ORAL ONCE
Qty: 0 | Refills: 0 | Status: COMPLETED | OUTPATIENT
Start: 2018-12-12 | End: 2018-12-12

## 2018-12-12 RX ORDER — LACTULOSE 10 G/15ML
20 SOLUTION ORAL
Qty: 0 | Refills: 0 | Status: DISCONTINUED | OUTPATIENT
Start: 2018-12-12 | End: 2018-12-13

## 2018-12-12 RX ADMIN — Medication 300 MILLIGRAM(S): at 17:18

## 2018-12-12 RX ADMIN — CHLORHEXIDINE GLUCONATE 1 APPLICATION(S): 213 SOLUTION TOPICAL at 01:30

## 2018-12-12 RX ADMIN — CHLORHEXIDINE GLUCONATE 15 MILLILITER(S): 213 SOLUTION TOPICAL at 05:42

## 2018-12-12 RX ADMIN — HEPARIN SODIUM 1800 UNIT(S)/HR: 5000 INJECTION INTRAVENOUS; SUBCUTANEOUS at 22:49

## 2018-12-12 RX ADMIN — Medication 50 MILLIGRAM(S): at 05:42

## 2018-12-12 RX ADMIN — PROPOFOL 5.8 MICROGRAM(S)/KG/MIN: 10 INJECTION, EMULSION INTRAVENOUS at 07:44

## 2018-12-12 RX ADMIN — MEROPENEM 100 MILLIGRAM(S): 1 INJECTION INTRAVENOUS at 05:42

## 2018-12-12 RX ADMIN — MEROPENEM 100 MILLIGRAM(S): 1 INJECTION INTRAVENOUS at 21:09

## 2018-12-12 RX ADMIN — ARIPIPRAZOLE 20 MILLIGRAM(S): 15 TABLET ORAL at 12:19

## 2018-12-12 RX ADMIN — PANTOPRAZOLE SODIUM 40 MILLIGRAM(S): 20 TABLET, DELAYED RELEASE ORAL at 12:19

## 2018-12-12 RX ADMIN — HEPARIN SODIUM 1800 UNIT(S)/HR: 5000 INJECTION INTRAVENOUS; SUBCUTANEOUS at 16:22

## 2018-12-12 RX ADMIN — MEROPENEM 100 MILLIGRAM(S): 1 INJECTION INTRAVENOUS at 14:15

## 2018-12-12 RX ADMIN — LACTULOSE 20 GRAM(S): 10 SOLUTION ORAL at 20:57

## 2018-12-12 RX ADMIN — INSULIN HUMAN 2 UNIT(S)/HR: 100 INJECTION, SOLUTION SUBCUTANEOUS at 07:45

## 2018-12-12 RX ADMIN — AMIODARONE HYDROCHLORIDE 200 MILLIGRAM(S): 400 TABLET ORAL at 12:19

## 2018-12-12 RX ADMIN — CHLORHEXIDINE GLUCONATE 15 MILLILITER(S): 213 SOLUTION TOPICAL at 17:19

## 2018-12-12 RX ADMIN — Medication 40 MILLIGRAM(S): at 14:18

## 2018-12-12 RX ADMIN — Medication 300 MILLIGRAM(S): at 05:42

## 2018-12-12 RX ADMIN — ALBUTEROL 2.5 MILLIGRAM(S): 90 AEROSOL, METERED ORAL at 22:37

## 2018-12-12 NOTE — ED ADULT NURSE NOTE - FINAL NURSING ELECTRONIC SIGNATURE
Cherie Parra [facility]    Bedside and Verbal shift change report given to *** (oncoming nurse) by Valerie Cardenas RN (offgoing nurse). Report included the following information SBAR, Kardex, Intake/Output, MAR and Recent Results. Patient resting quietly, eating, watching television. No complaints of pain. 08-Dec-2018 17:09

## 2018-12-12 NOTE — PROGRESS NOTE ADULT - ASSESSMENT
58/M hx of DM & HTN & schizophrenia?  presented to ED with SOB with orthopnea. Became hypotensive in ED after IV NTG was given for elevated BP. Required intubation and pressor support. Admit to CCU for Acute respiratory failure from Septic Shock requiring intubation and pressor support. CXR concerning for LLL infiltrate - PNA? vs ARDS?    Acute on chronic hypercapnic respiratory failure on MV  Septic shock secondary to aspiration PNA  Pulmonary edema secondary to CHF  HO Schizophrenia  Smoker    Overnight - pt had fever 102F  [X] TLC  [X] Nichole  [X] intubated  [  ] Sedated  [   ] Pressor    Problem List  Acute on chronic hypercapnic respiratory failure on MV  Septic shock secondary to aspiration PNA - off pressors   Pulmonary edema secondary to HFrEF  HO Schizophrenia  Smoker    - RVP negative  - UA - trace leuks; neg nitrates; Ur Cx neg  - Sputum Cx neg  - Bld cx -neg (12/9)  - CE 0.04>0.05>0.02    # Septic Shock due to aspiration PNA & Acute on chronic hypercapneic respiratory failure  - Intubated & Vented - A/C & PEEP; off sedation, pt not alert  - Off pressors  - Vanc & rex; Vanc trough ordered  - F/u Urine cx & MRSA nares  - Prednisone PO daily (12/10 start)   - CTH today, unable to get last night due to agitation    # Pulmonary edema secondary to CHF   - TTE (12/10) - mod impaired function 30-35%, LV dilated, LV enlarged, mod MR, mild TR, LA dilation, small pericardial effusion, RV pressure 31  - Will need ACEI/ARB; hold for now     # New AF (CHADSVASC-3) now converted to sinus  - Amiodarone   - No A/C now; will need CTH first    # DM - insulin infusion  # Hx of HTN - hold BP meds  # Schizophrenia - on ariprazole  # DVT/GI ppx     Full Code 58/M hx of DM & HTN & schizophrenia?  presented to ED with SOB with orthopnea. Became hypotensive in ED after IV NTG was given for elevated BP. Required intubation and pressor support. Admit to CCU for Acute respiratory failure from Septic Shock requiring intubation and pressor support. CXR concerning for LLL infiltrate - PNA? vs ARDS?    Acute on chronic hypercapnic respiratory failure on MV  Septic shock secondary to aspiration PNA  Pulmonary edema secondary to CHF  HO Schizophrenia  Smoker    Overnight - pt had fever 102F  [X] TLC  [X] Nichole  [X] intubated  [  ] Sedated  [   ] Pressor    Problem List  Acute on chronic hypercapnic respiratory failure on MV  Septic shock secondary to aspiration PNA - off pressors   Pulmonary edema secondary to HFrEF  HO Schizophrenia  Smoker    - RVP negative  - UA - trace leuks; neg nitrates; Ur Cx neg  - Sputum Cx neg  - Bld cx -neg (12/9)  - CE 0.04>0.05>0.02    # Septic Shock due to aspiration PNA & Acute on chronic hypercapneic respiratory failure  - Intubated & Vented - A/C & PEEP; off sedation, pt not alert  - Off pressors  - Vanc & rex; Vanc trough ordered  - F/u Urine cx & MRSA nares  - Prednisone PO daily (12/10 start)   - CTH today, unable to get last night due to agitation; re-ordered for today    # Pulmonary edema secondary to CHF   - TTE (12/10) - mod impaired function 30-35%, LV dilated, LV enlarged, mod MR, mild TR, LA dilation, small pericardial effusion, RV pressure 31  - Will need ACEI/ARB; hold for now     # New AF (CHADSVASC-3) now converted to sinus  - PO Amiodarone   - No A/C now; pt is high risk for CVA in view of dilated ventricles - will need CTH first to r/o IC pathology     # DM - insulin infusion  # Hx of HTN - hold BP meds  # Schizophrenia - on ariprazole  # DVT/GI ppx     Full Code 58/M hx of DM & HTN & schizophrenia?  presented to ED with SOB with orthopnea. Became hypotensive in ED after IV NTG was given for elevated BP. Required intubation and pressor support. Admit to CCU for Acute respiratory failure from Septic Shock requiring intubation and pressor support. CXR concerning for LLL infiltrate - PNA? vs ARDS?    Acute on chronic hypercapnic respiratory failure on MV  Septic shock secondary to aspiration PNA  Pulmonary edema secondary to CHF  HO Schizophrenia  Smoker    Overnight - pt had fever 102F  [X] TLC  [X] Nichole  [X] intubated  [  ] Sedated  [   ] Pressor    Problem List  Acute on chronic hypercapnic respiratory failure on MV  Septic shock secondary to aspiration PNA - off pressors   Pulmonary edema secondary to HFrEF  HO Schizophrenia  Smoker    - RVP negative  - UA - trace leuks; neg nitrates; Ur Cx neg  - Sputum Cx neg  - Bld cx -neg (12/9)  - CE 0.04>0.05>0.02    # Septic Shock due to aspiration PNA & Acute on chronic hypercapneic respiratory failure  - Intubated & Vented - A/C & PEEP; off sedation, pt not alert  - Off pressors  - Vanc & rex; Vanc trough ordered  - F/u Urine cx & MRSA nares  - Prednisone PO daily (12/10 start)   - CTH today, unable to get last night due to agitation; re-ordered for today  - rEEG - in progress    # Pulmonary edema secondary to CHF   - TTE (12/10) - mod impaired function 30-35%, LV dilated, LV enlarged, mod MR, mild TR, LA dilation, small pericardial effusion, RV pressure 31  - Will need ACEI/ARB; hold for now     # New AF (CHADSVASC-3) now converted to sinus  - PO Amiodarone   - No A/C now; pt is high risk for CVA in view of dilated ventricles - will need CTH first to r/o IC pathology     # DM - insulin infusion  # Hx of HTN - hold BP meds  # Schizophrenia - on ariprazole  # DVT/GI ppx     Full Code

## 2018-12-12 NOTE — CHART NOTE - NSCHARTNOTEFT_GEN_A_CORE
CTH negative for acute pathology.  Pt with new AF - converted to sinus with amiodarone.  D/w cards concerned for risk of CVA inview of dilated ventricles.  CHADVASC 3 (HTN, DM)  Will start heparin drip for full a/c (no bolus)  PTT for 10pm

## 2018-12-12 NOTE — PROGRESS NOTE ADULT - ASSESSMENT
Patient mildly sedated on vent. Echo EF 30 -35 %. Will need beta ace or arb when stable. Now in NSR. Complete IV amiodarone. Po amiodarone in am. Heparin if able.

## 2018-12-12 NOTE — PROGRESS NOTE ADULT - ASSESSMENT
IMPRESSION:  Acute on chronic hypercapnic respiratory failure on MV  Septic shock secondary to aspiration PNA  Pulmonary edema secondary to HfrEF  HO Schizophrenia  Smoker    PLAN:    CNS: Hold sedation to assess mental status; CT -H; REEG     HEENT: Oral care    PULMONARY: HOB at 45 degrees; c/w prednisone; Vent changes:  RR 24  PEEP 8; Taper Fio2 to 60%; FU repeat Cxr    CARDIOVASCULAR: Keep I<O; FU with cardio     GI: GI prophylaxis.  Feeding as tolerated;     RENAL:  FU lytes;     INFECTIOUS DISEASE: Panculture; FU MRSA nasal swab; FU vanco trough    HEMATOLOGICAL:  DVT prophylaxis.    ENDOCRINE:  Follow up FS.  Insulin protocol if needed.    MICU monitoring

## 2018-12-12 NOTE — PROGRESS NOTE ADULT - ASSESSMENT
continue current management  not able to do ct scan  patient was agitated  cardiology consultation noted and appreciated

## 2018-12-12 NOTE — PROGRESS NOTE ADULT - SUBJECTIVE AND OBJECTIVE BOX
Patient is a 58y old  Male who presents with a chief complaint of Septic Shock (12 Dec 2018 08:40)      OVER NIGHT EVENTS:  Agitated when going to [facility];  Afebrile;   Followed few commands in AM;    PHYSICAL EXAM    ICU Vital Signs Last 24 Hrs  T(C): 37.1 (12 Dec 2018 10:00), Max: 37.7 (11 Dec 2018 15:15)  T(F): 98.8 (12 Dec 2018 10:00), Max: 99.9 (11 Dec 2018 15:15)  HR: 69 (12 Dec 2018 10:00) (64 - 111)  BP: 96/54 (12 Dec 2018 10:00) (91/56 - 181/99)  BP(mean): 70 (12 Dec 2018 10:00) (69 - 132)  RR: 26 (12 Dec 2018 10:00) (24 - 38)  SpO2: 92% (12 Dec 2018 10:00) (86% - 99%)    I&O's Detail    11 Dec 2018 07:01  -  12 Dec 2018 07:00  --------------------------------------------------------  IN:    amiodarone Infusion: 467 mL    insulin Infusion: 76 mL    IV PiggyBack: 650 mL    propofol Infusion: 144.8 mL    Solution: 50 mL    Solution: 500 mL    Vital High Protein: 1320 mL  Total IN: 3207.8 mL    OUT:    Indwelling Catheter - Urethral: 1340 mL  Total OUT: 1340 mL    Total NET: 1867.8 mL    12 Dec 2018 07:01  -  12 Dec 2018 11:02  --------------------------------------------------------  IN:    amiodarone Infusion: 50.1 mL    insulin Infusion: 24 mL    propofol Infusion: 42.9 mL    Vital High Protein: 240 mL  Total IN: 357 mL    OUT:    Indwelling Catheter - Urethral: 85 mL  Total OUT: 85 mL    Total NET: 272 mL    General: Sedated in bed  HEENT:  ET +  Lymph node: No palpable LN             Lungs: DEVYN over bases  Cardiovascular: RRR, S1S2  Abdomen: BS+ve; soft non tender  Extremities: No LE edema  Skin:  No evident Rash  Neurological:  Sedated      LABS:                          13.2   10.20 )-----------( 224      ( 12 Dec 2018 05:41 )             40.6     12-    146  |  102  |  34<H>  ----------------------------<  193<H>  3.9   |  36<H>  |  0.6<L>    Ca    8.4<L>      12 Dec 2018 05:41  Phos  2.6     -  Mg     2.5     -12    TPro  5.5<L>  /  Alb  3.1<L>  /  TBili  0.5  /  DBili  x   /  AST  27  /  ALT  39  /  AlkPhos  77  12-12    Urinalysis Basic - ( 10 Dec 2018 14:40 )    Color: Yellow / Appearance: Clear / S.025 / pH: x  Gluc: x / Ketone: Negative  / Bili: Negative / Urobili: 0.2 mg/dL   Blood: x / Protein: Negative mg/dL / Nitrite: Negative   Leuk Esterase: Trace / RBC: 1-2 /HPF / WBC 1-2 /HPF   Sq Epi: x / Non Sq Epi: x / Bacteria: Few    CARDIAC MARKERS ( 11 Dec 2018 06:00 )  x     / <0.01 ng/mL / x     / x     / x      CARDIAC MARKERS ( 10 Dec 2018 12:50 )  x     / 0.02 ng/mL / 394 U/L / x     / 3.2 ng/mL    LIVER FUNCTIONS - ( 12 Dec 2018 05:41 )  Alb: 3.1 g/dL / Pro: 5.5 g/dL / ALK PHOS: 77 U/L / ALT: 39 U/L / AST: 27 U/L / GGT: x             Lactate (18 @ 06:48): 0.7  Lactate (18 @ 21:54): 1.4    Culture - Sputum (collected 10 Dec 2018 16:47)  Source: .Sputum Sputum  Gram Stain (10 Dec 2018 23:29):    Moderate polymorphonuclear leukocytes per low power field    No Squamous epithelial cells per low power field    No organisms seen  Preliminary Report (11 Dec 2018 16:27):    No growth to date.    Culture - Urine (collected 10 Dec 2018 14:40)  Source: .Urine Catheterized  Final Report (11 Dec 2018 22:25):    No growth      Mode: Auto Mode: PRVC/ Volume Support  RR (machine): 24  TV (machine): 480  FiO2: 60  PEEP: 8  MAP: 12  PIP: 22    ABG - ( 12 Dec 2018 06:25 )  pH, Arterial: 7.45  pH, Blood: x     /  pCO2: 54    /  pO2: 88    / HCO3: 38    / Base Excess: 11.0  /  SaO2: 96        MEDICATIONS  (STANDING):  amiodarone Infusion 1 mG/Min (33.333 mL/Hr) IV Continuous <Continuous>  amiodarone Infusion 0.5 mG/Min (16.667 mL/Hr) IV Continuous <Continuous>  ARIPiprazole 20 milliGRAM(s) Oral daily  chlorhexidine 0.12% Liquid 15 milliLiter(s) Oral Mucosa two times a day  chlorhexidine 4% Liquid 1 Application(s) Topical <User Schedule>  dextrose 50% Injectable 50 milliLiter(s) IV Push every 15 minutes  dextrose 50% Injectable 25 milliLiter(s) IV Push every 15 minutes  enoxaparin Injectable 40 milliGRAM(s) SubCutaneous every 24 hours  fentaNYL   Infusion 0.5 MICROgram(s)/kG/Hr (4.99 mL/Hr) IV Continuous <Continuous>  insulin Infusion 2 Unit(s)/Hr (2 mL/Hr) IV Continuous <Continuous>  meropenem  IVPB 1000 milliGRAM(s) IV Intermittent every 8 hours  meropenem  IVPB      midazolam Infusion 0.02 mG/kG/Hr (1.996 mL/Hr) IV Continuous <Continuous>  norepinephrine Infusion 0.05 MICROgram(s)/kG/Min (9.356 mL/Hr) IV Continuous <Continuous>  pantoprazole  Injectable 40 milliGRAM(s) IV Push daily  predniSONE   Tablet 50 milliGRAM(s) Oral daily  propofol Infusion 10 MICROgram(s)/kG/Min (5.802 mL/Hr) IV Continuous <Continuous>  vancomycin  IVPB      vancomycin  IVPB 1500 milliGRAM(s) IV Intermittent every 12 hours    MEDICATIONS  (PRN):  acetaminophen    Suspension .. 650 milliGRAM(s) Enteral Tube every 6 hours PRN Temp greater or equal to 38C (100.4F)    Radiology:  Chest xray Pending

## 2018-12-12 NOTE — PROGRESS NOTE ADULT - SUBJECTIVE AND OBJECTIVE BOX
Patient is a 58y old  Male who presents with a chief complaint of Septic Shock (11 Dec 2018 12:32)      T(F): 98.2 (12-12-18 @ 03:00), Max: 101.6 (12-11-18 @ 10:30)  HR: 64 (12-12-18 @ 05:00)  BP: 94/55 (12-12-18 @ 05:00)  RR: 24 (12-12-18 @ 05:00)  SpO2: 97% (12-12-18 @ 05:00) (86% - 99%)    PHYSICAL EXAM:  GENERAL: NAD, well-groomed, well-developed  HEAD:  Atraumatic, Normocephalic  EYES: EOMI, PERRLA, conjunctiva and sclera clear  ENMT: No tonsillar erythema, exudates, or enlargement; Moist mucous membranes, Good dentition, No lesions  NECK: Supple, No JVD, Normal thyroid  NERVOUS SYSTEM:  Alert & Oriented X3,  Motor Strength 5/5 B/L upper and lower extremities  CHEST/LUNG: Clear to percussion bilaterally; No rales, rhonchi, wheezing, or rubs  HEART: Regular rate and rhythm; No murmurs, rubs, or gallops  ABDOMEN: Soft, Nontender, Nondistended; Bowel sounds present  EXTREMITIES:   No clubbing, cyanosis, or edema  LYMPH: No lymphadenopathy noted  SKIN: No rashes or lesions    labs  12-11    145  |  101  |  23<H>  ----------------------------<  183<H>  4.3   |  36<H>  |  0.6<L>    Ca    8.5      11 Dec 2018 05:51  Phos  3.6     12-11  Mg     2.4     12-11    TPro  6.3  /  Alb  3.7  /  TBili  0.5  /  DBili  x   /  AST  21  /  ALT  37  /  AlkPhos  119<H>  12-11                          13.2   10.20 )-----------( 224      ( 12 Dec 2018 05:41 )             40.6       Culture - Sputum (collected 10 Dec 2018 16:47)  Source: .Sputum Sputum  Gram Stain (10 Dec 2018 23:29):    Moderate polymorphonuclear leukocytes per low power field    No Squamous epithelial cells per low power field    No organisms seen  Preliminary Report (11 Dec 2018 16:27):    No growth to date.    Culture - Urine (collected 10 Dec 2018 14:40)  Source: .Urine Catheterized  Final Report (11 Dec 2018 22:25):    No growth        Mode: Auto Mode: PRVC/ Volume Support  RR (machine): 24  TV (machine): 480  FiO2: 60  PEEP: 8  MAP: 13  PIP: 24        acetaminophen    Suspension .. 650 milliGRAM(s) Enteral Tube every 6 hours PRN  amiodarone Infusion 1 mG/Min IV Continuous <Continuous>  amiodarone Infusion 0.5 mG/Min IV Continuous <Continuous>  ARIPiprazole 20 milliGRAM(s) Oral daily  chlorhexidine 0.12% Liquid 15 milliLiter(s) Oral Mucosa two times a day  chlorhexidine 4% Liquid 1 Application(s) Topical <User Schedule>  dextrose 50% Injectable 50 milliLiter(s) IV Push every 15 minutes  dextrose 50% Injectable 25 milliLiter(s) IV Push every 15 minutes  enoxaparin Injectable 40 milliGRAM(s) SubCutaneous every 24 hours  fentaNYL   Infusion 0.5 MICROgram(s)/kG/Hr IV Continuous <Continuous>  insulin Infusion 2 Unit(s)/Hr IV Continuous <Continuous>  meropenem  IVPB 1000 milliGRAM(s) IV Intermittent every 8 hours  meropenem  IVPB      midazolam Infusion 0.02 mG/kG/Hr IV Continuous <Continuous>  norepinephrine Infusion 0.05 MICROgram(s)/kG/Min IV Continuous <Continuous>  pantoprazole  Injectable 40 milliGRAM(s) IV Push daily  predniSONE   Tablet 50 milliGRAM(s) Oral daily  propofol Infusion 10 MICROgram(s)/kG/Min IV Continuous <Continuous>  vancomycin  IVPB      vancomycin  IVPB 1500 milliGRAM(s) IV Intermittent every 12 hours

## 2018-12-12 NOTE — PROGRESS NOTE ADULT - SUBJECTIVE AND OBJECTIVE BOX
SUBJECTIVE:    Patient is a 58y old Male who presents with a chief complaint of Septic Shock (12 Dec 2018 08:30)    Currently admitted to medicine with the primary diagnosis of Acute respiratory failure with hypoxia and hypercapnia     Today is hospital day 4d. This morning he is resting comfortably in bed and reports no new issues or overnight events.     PAST MEDICAL & SURGICAL HISTORY  ETOH abuse  HLD (hyperlipidemia)  Hypertension, unspecified type  COPD (chronic obstructive pulmonary disease)  Schizophrenia  Psych & behavrl factors assoc w disord or dis classd elswhr  Diabetes  No significant past surgical history    SOCIAL HISTORY:  Negative for smoking/alcohol/drug use.     ALLERGIES:  No Known Allergies    MEDICATIONS:  STANDING MEDICATIONS  amiodarone Infusion 1 mG/Min IV Continuous <Continuous>  amiodarone Infusion 0.5 mG/Min IV Continuous <Continuous>  ARIPiprazole 20 milliGRAM(s) Oral daily  chlorhexidine 0.12% Liquid 15 milliLiter(s) Oral Mucosa two times a day  chlorhexidine 4% Liquid 1 Application(s) Topical <User Schedule>  dextrose 50% Injectable 50 milliLiter(s) IV Push every 15 minutes  dextrose 50% Injectable 25 milliLiter(s) IV Push every 15 minutes  enoxaparin Injectable 40 milliGRAM(s) SubCutaneous every 24 hours  fentaNYL   Infusion 0.5 MICROgram(s)/kG/Hr IV Continuous <Continuous>  insulin Infusion 2 Unit(s)/Hr IV Continuous <Continuous>  meropenem  IVPB 1000 milliGRAM(s) IV Intermittent every 8 hours  meropenem  IVPB      midazolam Infusion 0.02 mG/kG/Hr IV Continuous <Continuous>  norepinephrine Infusion 0.05 MICROgram(s)/kG/Min IV Continuous <Continuous>  pantoprazole  Injectable 40 milliGRAM(s) IV Push daily  predniSONE   Tablet 50 milliGRAM(s) Oral daily  propofol Infusion 10 MICROgram(s)/kG/Min IV Continuous <Continuous>  vancomycin  IVPB      vancomycin  IVPB 1500 milliGRAM(s) IV Intermittent every 12 hours    PRN MEDICATIONS  acetaminophen    Suspension .. 650 milliGRAM(s) Enteral Tube every 6 hours PRN    VITALS:   ICU Vital Signs Last 24 Hrs  T(C): 37.3 (12 Dec 2018 07:05), Max: 38.7 (11 Dec 2018 10:30)  T(F): 99.1 (12 Dec 2018 07:05), Max: 101.6 (11 Dec 2018 10:30)  HR: 75 (12 Dec 2018 08:05) (64 - 162)  BP: 102/60 (12 Dec 2018 07:00) (91/56 - 181/99)  BP(mean): 76 (12 Dec 2018 07:00) (69 - 132)  RR: 24 (12 Dec 2018 07:05) (24 - 38)  SpO2: 96% (12 Dec 2018 08:05) (86% - 99%)      LABS:                        13.2   10.20 )-----------( 224      ( 12 Dec 2018 05:41 )             40.6     12-12    146  |  102  |  34<H>  ----------------------------<  193<H>  3.9   |  36<H>  |  0.6<L>    Ca    8.4<L>      12 Dec 2018 05:41  Phos  2.6     12  Mg     2.5     1212    TPro  5.5<L>  /  Alb  3.1<L>  /  TBili  0.5  /  DBili  x   /  AST  27  /  ALT  39  /  AlkPhos  77  12-12      Urinalysis Basic - ( 10 Dec 2018 14:40 )    Color: Yellow / Appearance: Clear / S.025 / pH: x  Gluc: x / Ketone: Negative  / Bili: Negative / Urobili: 0.2 mg/dL   Blood: x / Protein: Negative mg/dL / Nitrite: Negative   Leuk Esterase: Trace / RBC: 1-2 /HPF / WBC 1-2 /HPF   Sq Epi: x / Non Sq Epi: x / Bacteria: Few      ABG - ( 12 Dec 2018 06:25 )  pH, Arterial: 7.45  pH, Blood: x     /  pCO2: 54    /  pO2: 88    / HCO3: 38    / Base Excess: 11.0  /  SaO2: 96                    Culture - Sputum (collected 10 Dec 2018 16:47)  Source: .Sputum Sputum  Gram Stain (10 Dec 2018 23:29):    Moderate polymorphonuclear leukocytes per low power field    No Squamous epithelial cells per low power field    No organisms seen  Preliminary Report (11 Dec 2018 16:27):    No growth to date.    Culture - Urine (collected 10 Dec 2018 14:40)  Source: .Urine Catheterized  Final Report (11 Dec 2018 22:25):    No growth      CARDIAC MARKERS ( 11 Dec 2018 06:00 )  x     / <0.01 ng/mL / x     / x     / x      CARDIAC MARKERS ( 10 Dec 2018 12:50 )  x     / 0.02 ng/mL / 394 U/L / x     / 3.2 ng/mL      RADIOLOGY:    PHYSICAL EXAM:  PHYSICAL EXAM:  GENERAL: intubated, in no apparent distress  HEAD:  Atraumatic, Normocephalic  EYES: pupils rx to light  NECK: right IJ  CHEST/LUNG: b/l vent sounds detected  HEART: Regular rate and rhythm; No murmurs;   ABDOMEN: Soft, Nontender, Nondistended; Bowel sounds present; No guarding  EXTREMITIES:  no LE edema  PSYCH: AAOx3  NEUROLOGY: pupils rx to light  SKIN: No rashes or lesions

## 2018-12-12 NOTE — PROGRESS NOTE ADULT - SUBJECTIVE AND OBJECTIVE BOX
ISRAEL MO  58y  Male    Patient is a 58y old  Male who presents with a chief complaint of Septic Shock (12 Dec 2018 07:05)    ALLERGIES:  No Known Allergies      INTERVAL HPI/OVERNIGHT EVENTS:    VITALS:  T(F): 99.1 (12-12-18 @ 07:05), Max: 101.6 (12-11-18 @ 10:30)  HR: 75 (12-12-18 @ 08:05) (64 - 162)  BP: 102/60 (12-12-18 @ 07:00) (91/56 - 181/99)  RR: 24 (12-12-18 @ 07:05) (24 - 38)  SpO2: 96% (12-12-18 @ 08:05) (86% - 99%)    LABS:  12-12    146  |  102  |  34<H>  ----------------------------<  193<H>  3.9   |  36<H>  |  0.6<L>    Ca    8.4<L>      12 Dec 2018 05:41  Phos  2.6     12-12  Mg     2.5     12-12    TPro  5.5<L>  /  Alb  3.1<L>  /  TBili  0.5  /  DBili  x   /  AST  27  /  ALT  39  /  AlkPhos  77  12-12    MICROBIOLOGY:    MEDICATION:  acetaminophen    Suspension .. 650 milliGRAM(s) Enteral Tube every 6 hours PRN  amiodarone Infusion 1 mG/Min IV Continuous <Continuous>  amiodarone Infusion 0.5 mG/Min IV Continuous <Continuous>  ARIPiprazole 20 milliGRAM(s) Oral daily  chlorhexidine 0.12% Liquid 15 milliLiter(s) Oral Mucosa two times a day  chlorhexidine 4% Liquid 1 Application(s) Topical <User Schedule>  dextrose 50% Injectable 50 milliLiter(s) IV Push every 15 minutes  dextrose 50% Injectable 25 milliLiter(s) IV Push every 15 minutes  enoxaparin Injectable 40 milliGRAM(s) SubCutaneous every 24 hours  fentaNYL   Infusion 0.5 MICROgram(s)/kG/Hr IV Continuous <Continuous>  insulin Infusion 2 Unit(s)/Hr IV Continuous <Continuous>  meropenem  IVPB 1000 milliGRAM(s) IV Intermittent every 8 hours  meropenem  IVPB      midazolam Infusion 0.02 mG/kG/Hr IV Continuous <Continuous>  norepinephrine Infusion 0.05 MICROgram(s)/kG/Min IV Continuous <Continuous>  pantoprazole  Injectable 40 milliGRAM(s) IV Push daily  predniSONE   Tablet 50 milliGRAM(s) Oral daily  propofol Infusion 10 MICROgram(s)/kG/Min IV Continuous <Continuous>  vancomycin  IVPB      vancomycin  IVPB 1500 milliGRAM(s) IV Intermittent every 12 hours    RADIOLOGY & ADDITIONAL TESTS:

## 2018-12-13 LAB
ALBUMIN SERPL ELPH-MCNC: 3.1 G/DL — LOW (ref 3.5–5.2)
ALP SERPL-CCNC: 76 U/L — SIGNIFICANT CHANGE UP (ref 30–115)
ALT FLD-CCNC: 35 U/L — SIGNIFICANT CHANGE UP (ref 0–41)
ANION GAP SERPL CALC-SCNC: 11 MMOL/L — SIGNIFICANT CHANGE UP (ref 7–14)
APTT BLD: 85.1 SEC — CRITICAL HIGH (ref 27–39.2)
APTT BLD: 87.4 SEC — CRITICAL HIGH (ref 27–39.2)
AST SERPL-CCNC: 22 U/L — SIGNIFICANT CHANGE UP (ref 0–41)
BASE EXCESS BLDA CALC-SCNC: 12.8 MMOL/L — HIGH (ref -2–2)
BASE EXCESS BLDA CALC-SCNC: 13.6 MMOL/L — HIGH (ref -2–2)
BASOPHILS # BLD AUTO: 0.03 K/UL — SIGNIFICANT CHANGE UP (ref 0–0.2)
BASOPHILS # BLD AUTO: 0.03 K/UL — SIGNIFICANT CHANGE UP (ref 0–0.2)
BASOPHILS NFR BLD AUTO: 0.4 % — SIGNIFICANT CHANGE UP (ref 0–1)
BASOPHILS NFR BLD AUTO: 0.4 % — SIGNIFICANT CHANGE UP (ref 0–1)
BILIRUB SERPL-MCNC: 0.5 MG/DL — SIGNIFICANT CHANGE UP (ref 0.2–1.2)
BUN SERPL-MCNC: 32 MG/DL — HIGH (ref 10–20)
CALCIUM SERPL-MCNC: 8.3 MG/DL — LOW (ref 8.5–10.1)
CHLORIDE SERPL-SCNC: 105 MMOL/L — SIGNIFICANT CHANGE UP (ref 98–110)
CO2 SERPL-SCNC: 36 MMOL/L — HIGH (ref 17–32)
CREAT SERPL-MCNC: 0.5 MG/DL — LOW (ref 0.7–1.5)
EOSINOPHIL # BLD AUTO: 0.06 K/UL — SIGNIFICANT CHANGE UP (ref 0–0.7)
EOSINOPHIL # BLD AUTO: 0.32 K/UL — SIGNIFICANT CHANGE UP (ref 0–0.7)
EOSINOPHIL NFR BLD AUTO: 0.8 % — SIGNIFICANT CHANGE UP (ref 0–8)
EOSINOPHIL NFR BLD AUTO: 3.8 % — SIGNIFICANT CHANGE UP (ref 0–8)
GLUCOSE BLDC GLUCOMTR-MCNC: 103 MG/DL — HIGH (ref 70–99)
GLUCOSE BLDC GLUCOMTR-MCNC: 105 MG/DL — HIGH (ref 70–99)
GLUCOSE BLDC GLUCOMTR-MCNC: 111 MG/DL — HIGH (ref 70–99)
GLUCOSE BLDC GLUCOMTR-MCNC: 123 MG/DL — HIGH (ref 70–99)
GLUCOSE BLDC GLUCOMTR-MCNC: 128 MG/DL — HIGH (ref 70–99)
GLUCOSE BLDC GLUCOMTR-MCNC: 128 MG/DL — HIGH (ref 70–99)
GLUCOSE BLDC GLUCOMTR-MCNC: 154 MG/DL — HIGH (ref 70–99)
GLUCOSE BLDC GLUCOMTR-MCNC: 155 MG/DL — HIGH (ref 70–99)
GLUCOSE BLDC GLUCOMTR-MCNC: 165 MG/DL — HIGH (ref 70–99)
GLUCOSE BLDC GLUCOMTR-MCNC: 182 MG/DL — HIGH (ref 70–99)
GLUCOSE BLDC GLUCOMTR-MCNC: 215 MG/DL — HIGH (ref 70–99)
GLUCOSE BLDC GLUCOMTR-MCNC: 219 MG/DL — HIGH (ref 70–99)
GLUCOSE BLDC GLUCOMTR-MCNC: 222 MG/DL — HIGH (ref 70–99)
GLUCOSE BLDC GLUCOMTR-MCNC: 242 MG/DL — HIGH (ref 70–99)
GLUCOSE BLDC GLUCOMTR-MCNC: 86 MG/DL — SIGNIFICANT CHANGE UP (ref 70–99)
GLUCOSE SERPL-MCNC: 150 MG/DL — HIGH (ref 70–99)
HCO3 BLDA-SCNC: 40 MMOL/L — HIGH (ref 23–27)
HCO3 BLDA-SCNC: 40 MMOL/L — HIGH (ref 23–27)
HCT VFR BLD CALC: 35.3 % — LOW (ref 42–52)
HCT VFR BLD CALC: 40.4 % — LOW (ref 42–52)
HGB BLD-MCNC: 11.3 G/DL — LOW (ref 14–18)
HGB BLD-MCNC: 13 G/DL — LOW (ref 14–18)
HOROWITZ INDEX BLDA+IHG-RTO: 50 — SIGNIFICANT CHANGE UP
HOROWITZ INDEX BLDA+IHG-RTO: 60 — SIGNIFICANT CHANGE UP
IMM GRANULOCYTES NFR BLD AUTO: 0.4 % — HIGH (ref 0.1–0.3)
IMM GRANULOCYTES NFR BLD AUTO: 0.5 % — HIGH (ref 0.1–0.3)
LYMPHOCYTES # BLD AUTO: 0.51 K/UL — LOW (ref 1.2–3.4)
LYMPHOCYTES # BLD AUTO: 0.83 K/UL — LOW (ref 1.2–3.4)
LYMPHOCYTES # BLD AUTO: 6.8 % — LOW (ref 20.5–51.1)
LYMPHOCYTES # BLD AUTO: 9.9 % — LOW (ref 20.5–51.1)
MAGNESIUM SERPL-MCNC: 2.4 MG/DL — SIGNIFICANT CHANGE UP (ref 1.8–2.4)
MCHC RBC-ENTMCNC: 30.3 PG — SIGNIFICANT CHANGE UP (ref 27–31)
MCHC RBC-ENTMCNC: 30.3 PG — SIGNIFICANT CHANGE UP (ref 27–31)
MCHC RBC-ENTMCNC: 32 G/DL — SIGNIFICANT CHANGE UP (ref 32–37)
MCHC RBC-ENTMCNC: 32.2 G/DL — SIGNIFICANT CHANGE UP (ref 32–37)
MCV RBC AUTO: 94.2 FL — HIGH (ref 80–94)
MCV RBC AUTO: 94.6 FL — HIGH (ref 80–94)
MONOCYTES # BLD AUTO: 0.38 K/UL — SIGNIFICANT CHANGE UP (ref 0.1–0.6)
MONOCYTES # BLD AUTO: 0.64 K/UL — HIGH (ref 0.1–0.6)
MONOCYTES NFR BLD AUTO: 5 % — SIGNIFICANT CHANGE UP (ref 1.7–9.3)
MONOCYTES NFR BLD AUTO: 7.6 % — SIGNIFICANT CHANGE UP (ref 1.7–9.3)
NEUTROPHILS # BLD AUTO: 6.52 K/UL — HIGH (ref 1.4–6.5)
NEUTROPHILS # BLD AUTO: 6.53 K/UL — HIGH (ref 1.4–6.5)
NEUTROPHILS NFR BLD AUTO: 77.9 % — HIGH (ref 42.2–75.2)
NEUTROPHILS NFR BLD AUTO: 86.5 % — HIGH (ref 42.2–75.2)
PCO2 BLDA: 54 MMHG — HIGH (ref 38–42)
PCO2 BLDA: 57 MMHG — HIGH (ref 38–42)
PH BLDA: 7.45 — HIGH (ref 7.38–7.42)
PH BLDA: 7.47 — HIGH (ref 7.38–7.42)
PHOSPHATE SERPL-MCNC: 3.5 MG/DL — SIGNIFICANT CHANGE UP (ref 2.1–4.9)
PLATELET # BLD AUTO: 187 K/UL — SIGNIFICANT CHANGE UP (ref 130–400)
PLATELET # BLD AUTO: 230 K/UL — SIGNIFICANT CHANGE UP (ref 130–400)
PO2 BLDA: 100 MMHG — HIGH (ref 78–95)
PO2 BLDA: 89 MMHG — SIGNIFICANT CHANGE UP (ref 78–95)
POTASSIUM SERPL-MCNC: 3.7 MMOL/L — SIGNIFICANT CHANGE UP (ref 3.5–5)
POTASSIUM SERPL-SCNC: 3.7 MMOL/L — SIGNIFICANT CHANGE UP (ref 3.5–5)
PROT SERPL-MCNC: 5.3 G/DL — LOW (ref 6–8)
RBC # BLD: 3.73 M/UL — LOW (ref 4.7–6.1)
RBC # BLD: 4.29 M/UL — LOW (ref 4.7–6.1)
RBC # FLD: 13.8 % — SIGNIFICANT CHANGE UP (ref 11.5–14.5)
RBC # FLD: 13.9 % — SIGNIFICANT CHANGE UP (ref 11.5–14.5)
SAO2 % BLDA: 96 % — SIGNIFICANT CHANGE UP (ref 94–98)
SAO2 % BLDA: 97 % — SIGNIFICANT CHANGE UP (ref 94–98)
SODIUM SERPL-SCNC: 152 MMOL/L — HIGH (ref 135–146)
TYPE + AB SCN PNL BLD: SIGNIFICANT CHANGE UP
WBC # BLD: 7.54 K/UL — SIGNIFICANT CHANGE UP (ref 4.8–10.8)
WBC # BLD: 8.38 K/UL — SIGNIFICANT CHANGE UP (ref 4.8–10.8)
WBC # FLD AUTO: 7.54 K/UL — SIGNIFICANT CHANGE UP (ref 4.8–10.8)
WBC # FLD AUTO: 8.38 K/UL — SIGNIFICANT CHANGE UP (ref 4.8–10.8)

## 2018-12-13 RX ORDER — ENOXAPARIN SODIUM 100 MG/ML
100 INJECTION SUBCUTANEOUS
Qty: 0 | Refills: 0 | Status: DISCONTINUED | OUTPATIENT
Start: 2018-12-13 | End: 2018-12-22

## 2018-12-13 RX ORDER — PANTOPRAZOLE SODIUM 20 MG/1
40 TABLET, DELAYED RELEASE ORAL DAILY
Qty: 0 | Refills: 0 | Status: DISCONTINUED | OUTPATIENT
Start: 2018-12-13 | End: 2018-12-20

## 2018-12-13 RX ADMIN — LACTULOSE 20 GRAM(S): 10 SOLUTION ORAL at 05:27

## 2018-12-13 RX ADMIN — PROPOFOL 5.8 MICROGRAM(S)/KG/MIN: 10 INJECTION, EMULSION INTRAVENOUS at 20:00

## 2018-12-13 RX ADMIN — ENOXAPARIN SODIUM 100 MILLIGRAM(S): 100 INJECTION SUBCUTANEOUS at 18:19

## 2018-12-13 RX ADMIN — PROPOFOL 5.8 MICROGRAM(S)/KG/MIN: 10 INJECTION, EMULSION INTRAVENOUS at 23:26

## 2018-12-13 RX ADMIN — CHLORHEXIDINE GLUCONATE 1 APPLICATION(S): 213 SOLUTION TOPICAL at 06:11

## 2018-12-13 RX ADMIN — Medication 300 MILLIGRAM(S): at 05:31

## 2018-12-13 RX ADMIN — CHLORHEXIDINE GLUCONATE 15 MILLILITER(S): 213 SOLUTION TOPICAL at 05:31

## 2018-12-13 RX ADMIN — PROPOFOL 5.8 MICROGRAM(S)/KG/MIN: 10 INJECTION, EMULSION INTRAVENOUS at 03:36

## 2018-12-13 RX ADMIN — Medication 650 MILLIGRAM(S): at 22:45

## 2018-12-13 RX ADMIN — INSULIN HUMAN 2 UNIT(S)/HR: 100 INJECTION, SOLUTION SUBCUTANEOUS at 03:35

## 2018-12-13 RX ADMIN — Medication 50 MILLIGRAM(S): at 06:11

## 2018-12-13 RX ADMIN — PROPOFOL 5.8 MICROGRAM(S)/KG/MIN: 10 INJECTION, EMULSION INTRAVENOUS at 13:02

## 2018-12-13 RX ADMIN — MEROPENEM 100 MILLIGRAM(S): 1 INJECTION INTRAVENOUS at 21:50

## 2018-12-13 RX ADMIN — MEROPENEM 100 MILLIGRAM(S): 1 INJECTION INTRAVENOUS at 05:31

## 2018-12-13 RX ADMIN — MEROPENEM 100 MILLIGRAM(S): 1 INJECTION INTRAVENOUS at 13:45

## 2018-12-13 RX ADMIN — AMIODARONE HYDROCHLORIDE 200 MILLIGRAM(S): 400 TABLET ORAL at 05:27

## 2018-12-13 RX ADMIN — PROPOFOL 5.8 MICROGRAM(S)/KG/MIN: 10 INJECTION, EMULSION INTRAVENOUS at 09:37

## 2018-12-13 RX ADMIN — CHLORHEXIDINE GLUCONATE 15 MILLILITER(S): 213 SOLUTION TOPICAL at 18:20

## 2018-12-13 RX ADMIN — HEPARIN SODIUM 1800 UNIT(S)/HR: 5000 INJECTION INTRAVENOUS; SUBCUTANEOUS at 06:41

## 2018-12-13 RX ADMIN — ARIPIPRAZOLE 20 MILLIGRAM(S): 15 TABLET ORAL at 12:13

## 2018-12-13 RX ADMIN — PANTOPRAZOLE SODIUM 40 MILLIGRAM(S): 20 TABLET, DELAYED RELEASE ORAL at 12:13

## 2018-12-13 NOTE — PROGRESS NOTE ADULT - SUBJECTIVE AND OBJECTIVE BOX
ISRAEL MO  58y  Male      Patient is a 58y old  Male who presents with a chief complaint of Septic Shock (13 Dec 2018 10:45)        IACUTE RESPIRATORY FAILURE WITH HYPOXIA AND HYPERCAPNIA ACUTE CONGESTIVE  ^ DIFFCULTY BREATHIGNM  No pertinent family history in first degree relatives  Handoff  MEWS Score  ETOH abuse  HLD (hyperlipidemia)  Hypertension, unspecified type  COPD (chronic obstructive pulmonary disease)  Schizophrenia  Psych & behavrl factors assoc w disord or dis classd elswhr  Diabetes  Acute respiratory failure with hypoxia and hypercapnia  No significant past surgical history  DIFFCULTY BREATHIGNM  90+  Acute congestive heart failure, unspecified heart failure type  NTERVAL HPI/OVERNIGHT EVENTS:        REVIEW OF SYSTEMS:  CONSTITUTIONAL: No fever, weight loss, or fatigue  EYES: No eye pain, visual disturbances, or discharge  ENMT:  No difficulty hearing, tinnitus, vertigo; No sinus or throat pain  NECK: No pain or stiffness  BREASTS: No pain, masses, or nipple discharge  RESPIRATORY: No cough, wheezing, chills or hemoptysis; No shortness of breath  CARDIOVASCULAR: No chest pain, palpitations, dizziness, or leg swelling  GASTROINTESTINAL: No abdominal or epigastric pain. No nausea, vomiting, or hematemesis; No diarrhea or constipation. No melena or hematochezia.  GENITOURINARY: No dysuria, frequency, hematuria, or incontinence  NEUROLOGICAL: No headaches, memory loss, loss of strength, numbness, or tremors  SKIN: No itching, burning, rashes, or lesions   LYMPH NODES: No enlarged glands  ENDOCRINE: No heat or cold intolerance; No hair loss  MUSCULOSKELETAL: No joint pain or swelling; No muscle, back, or extremity pain  PSYCHIATRIC: No depression, anxiety, mood swings, or difficulty sleeping  HEME/LYMPH: No easy bruising, or bleeding gums  ALLERY AND IMMUNOLOGIC: No hives or eczema  FAMILY HISTORY:  No pertinent family history in first degree relatives    T(C): 37.1 (12-13-18 @ 11:00), Max: 37.7 (12-12-18 @ 23:29)  HR: 76 (12-13-18 @ 08:01) (60 - 85)  BP: 132/66 (12-13-18 @ 08:01) (94/53 - 138/78)  RR: 24 (12-13-18 @ 11:00) (24 - 33)  SpO2: 97% (12-13-18 @ 08:01) (97% - 100%)  Wt(kg): --Vital Signs Last 24 Hrs  T(C): 37.1 (13 Dec 2018 11:00), Max: 37.7 (12 Dec 2018 23:29)  T(F): 98.8 (13 Dec 2018 11:00), Max: 99.9 (12 Dec 2018 23:29)  HR: 76 (13 Dec 2018 08:01) (60 - 85)  BP: 132/66 (13 Dec 2018 08:01) (94/53 - 138/78)  BP(mean): 92 (13 Dec 2018 08:01) (78 - 102)  RR: 24 (13 Dec 2018 11:00) (24 - 33)  SpO2: 97% (13 Dec 2018 08:01    physical    intubated  NECK: Supple, No JVD, Normal thyroid  NERVOUS SYSTEM:  Alert & Oriented X3, Good concentration; Motor Strength 5/5 B/L upper and lower extremities; DTRs 2+ intact and symmetric  CHEST/LUNG: Clear to percussion bilaterally; No rales, rhonchi, wheezing, or rubs  HEART: Regular rate and rhythm; No murmurs, rubs, or gallops  ABDOMEN: Soft, Nontender, Nondistended; Bowel sounds present  EXTREMITIES:  2+ Peripheral Pulses, No clubbing, cyanosis, or edema  LYMPH: No lymphadenopathy noted  SKIN: No rashes or lesions    Consultant(s) Notes Reviewed:  [x ] YES  [ ] NO  Care Discussed with Consultants/Other Providers [ x] YES  [ ] NO          Imaging Personally Reviewed:  [ ] YES  [ ] NO    HEALTH ISSUES - PROBLEM Dx:  respiratory failure  aspiration pneumonia  atrial fib

## 2018-12-13 NOTE — CHART NOTE - NSCHARTNOTEFT_GEN_A_CORE
Registered Dietitian Follow-Up     Patient Profile Reviewed                           Yes [x]   No []     Nutrition History Previously Obtained        Yes [x]  No []       Pertinent Subjective Information: Spoke w/ RN who reports that the Pt is tolerating his current TF regimen. Pt is receiving Vital HP @60ml/hr. Will need to reduce feeds. Pt is now receiving propofol@ 30ml/hr.      Pertinent Medical Interventions: Pt presented to the ED for SOB. Pt is admitted for acute respiratory failure from septic shock requiring intubation and pressor support. Septic shock 2/2 aspiration PNA and pulmonary edema 2/2 CHF noted.      Diet order: Vital HP @60ml/hr (1440ml total volume, 1440kcals, 126g protein, 1210ml water)     Anthropometrics:  - Ht. 175.26cm/5'9  - Wt. 96.7kg/213#  - Wt range 91.3kg-99.8kg  - BMI 31.5  - IBW 73kg     Pertinent Lab Data: (12/13) H/H 11.3/35.3, sodium 152, BUN 32, creat 0.5, glucose 150, calcium 8.3, albumin 3.1     Pertinent Meds: lactulose syrup, propofol, prednisone, insulin, norepinephrine, pantoprazole     Physical Findings:  - Appearance: intubated  - GI function: RN reports that the Pt had a large BM today 12/13. Abdomen noted as soft/nontender.   - Tubes: OGT   - Oral/Mouth cavity: n/a  - Skin: Obie score 13. Skin intact.      Nutrition Requirements  Weight Used: ideal 73kg     Estimated Energy Needs    Continue [x]  Adjust []  1372-1600kcals/day (PSU 2003b: 60%-70% RMR 2286)     Estimated Protein Needs    Continue [x]  Adjust []  110-146g/day (1.5-2g/IBW)    Estimated Fluid Needs        Continue [x]  Adjust []  as per CCU team     [] Previous Nutrition Diagnosis:  Less than optimal enteral nutrition composition or modality            [] Ongoing          [x] Resolved: Pt is now receiving too much     Nutrition Diagnostic #1 NEW PES  Problem: Excessive EN   Etiology: Pt was placed on propofol   Statement: Pt is meeting >105% of his estimated calorie needs     Nutrition Intervention: EN      Goal/Expected Outcome: Meet >85% and <105% of estimated needs for 4 days      Indicator/Monitoring: Will monitor intake, nutrition focused physical findings, and labs. At risk.     Recommendations:   EN: Vital HP @25ml/hr (600ml total volume, 600kcals, 53g protein, 504ml water) + 10 Beneprotein packets (250kcals, 60g protein) + Propofol 30ml/hr (792kcals)= 1642kcals, 113g protein, 504ml water

## 2018-12-13 NOTE — PROGRESS NOTE ADULT - ASSESSMENT
58/M hx of DM & HTN & schizophrenia?  presented to ED with SOB with orthopnea. Became hypotensive in ED after IV NTG was given for elevated BP. Required intubation and pressor support. Admit to CCU for Acute respiratory failure from Septic Shock requiring intubation and pressor support. CXR concerning for LLL infiltrate - PNA? vs ARDS?    Acute on chronic hypercapnic respiratory failure on MV  Septic shock secondary to aspiration PNA  Pulmonary edema secondary to CHF  HO Schizophrenia  Smoker    [X] TLC  [X] Nichole  [X] intubated  [  ] Sedated  [   ] Pressor    Problem List  Acute on chronic hypercapnic respiratory failure on MV  Septic shock secondary to aspiration PNA - off pressors   Pulmonary edema secondary to HFrEF  HO Schizophrenia  Smoker    - RVP negative  - UA - trace leuks; neg nitrates; Ur Cx neg  - Sputum Cx neg  - Bld cx -neg (12/9)  - CE 0.04>0.05>0.02  - rEEG: Abnormal due to the presence of: generalized slowing as above  - MRSA negative  - All cx negative    # Septic Shock due to aspiration PNA & Acute on chronic hypercapneic respiratory failure  - Intubated & Vented   - Off pressors  - C/w meropenem  - Prednisone PO daily (12/10 start)   - CTH neg, EEG - gen. slowing    # Pulmonary edema secondary to CHF   - TTE (12/10) - mod impaired function 30-35%, LV dilated, LV enlarged, mod MR, mild TR, LA dilation, small pericardial effusion, RV pressure 31  - Will need ACEI/ARB & B-blocker; hold for now     # New AF (CHADSVASC-3) now converted to sinus  - PO Amiodarone   - CTH negative  - Heparin drip for a/c    # Drop in Hb - repeat now & T/S  - Had BM today - non-bloody, non-black    # Hypernatremia - start free water  - Free water deficit 3.2L; start 400cc free water q6Hr    # DM - insulin infusion  # Hx of HTN - hold BP meds  # Schizophrenia - on ariprazole  # DVT/GI ppx     Full Code

## 2018-12-13 NOTE — PROGRESS NOTE ADULT - ASSESSMENT
IMPRESSION:  Acute on chronic hypercapnic respiratory failure on MV  Septic shock secondary to aspiration PNA  Pulmonary edema secondary to HfrEF  HO Schizophrenia  Smoker    PLAN:    CNS: Daily SAT; Propofol for sedation     HEENT: Oral care    PULMONARY: HOB at 45 degrees; c/w prednisone; Vent changes:  RR 24  PEEP 8; Taper Fio2 to 50 %;     CARDIOVASCULAR: Keep I<O; FU with cardio;     GI: GI prophylaxis.  Feeding as tolerated; D/C lactulose;     RENAL:  FU lytes;     INFECTIOUS DISEASE: Panculture; D/C vanco; c/w rex    HEMATOLOGICAL:  DVT prophylaxis. FU PTT; Repeat Hb;  if Hb stable can change to lovenox     ENDOCRINE:  Follow up FS.  Insulin protocol if needed.    MICU monitoring IMPRESSION:  Acute on chronic hypercapnic respiratory failure on MV  Septic shock secondary to aspiration PNA  Pulmonary edema secondary to HfrEF  HO Schizophrenia  Smoker  hypernatremia    PLAN:    CNS: Daily SAT; Propofol for sedation     HEENT: Oral care    PULMONARY: HOB at 45 degrees; c/w prednisone; Vent changes:  RR 24  PEEP 8; Taper Fio2 to 50 %;     CARDIOVASCULAR: Keep I<O; FU with cardio;     GI: GI prophylaxis.  Feeding as tolerated; D/C lactulose;     RENAL:  FU lytes; free water     INFECTIOUS DISEASE: Panculture; D/C vanco; c/w rxe    HEMATOLOGICAL:  DVT prophylaxis. FU PTT; Repeat Hb;  if Hb stable can change to lovenox     ENDOCRINE:  Follow up FS.  Insulin protocol if needed.    MICU monitoring

## 2018-12-13 NOTE — PROGRESS NOTE ADULT - ASSESSMENT
Patient mildly sedated on vent. Echo EF 30 -35 %. Will need beta ace or arb when stable. Now in NSR. PO amiodarone . Heparin for now. When off sedation not appropriate. . ? try hold sedation again today. Prognosis guarded

## 2018-12-13 NOTE — PROGRESS NOTE ADULT - SUBJECTIVE AND OBJECTIVE BOX
SUBJECTIVE:    Patient is a 58y old Male who presents with a chief complaint of Septic Shock (13 Dec 2018 11:17)    Currently admitted to medicine with the primary diagnosis of Acute respiratory failure with hypoxia and hypercapnia     Today is hospital day 5d. This morning he is resting comfortably in bed and reports no new issues or overnight events.     PAST MEDICAL & SURGICAL HISTORY  ETOH abuse  HLD (hyperlipidemia)  Hypertension, unspecified type  COPD (chronic obstructive pulmonary disease)  Schizophrenia  Psych & behavrl factors assoc w disord or dis classd elswhr  Diabetes  No significant past surgical history    SOCIAL HISTORY:  Negative for smoking/alcohol/drug use.     ALLERGIES:  No Known Allergies    MEDICATIONS:  STANDING MEDICATIONS  amiodarone    Tablet 200 milliGRAM(s) Oral daily  ARIPiprazole 20 milliGRAM(s) Oral daily  chlorhexidine 0.12% Liquid 15 milliLiter(s) Oral Mucosa two times a day  chlorhexidine 4% Liquid 1 Application(s) Topical <User Schedule>  dextrose 50% Injectable 50 milliLiter(s) IV Push every 15 minutes  dextrose 50% Injectable 25 milliLiter(s) IV Push every 15 minutes  fentaNYL   Infusion 0.5 MICROgram(s)/kG/Hr IV Continuous <Continuous>  heparin  Infusion.  Unit(s)/Hr IV Continuous <Continuous>  insulin Infusion 2 Unit(s)/Hr IV Continuous <Continuous>  meropenem  IVPB      meropenem  IVPB 1000 milliGRAM(s) IV Intermittent every 8 hours  midazolam Infusion 0.02 mG/kG/Hr IV Continuous <Continuous>  norepinephrine Infusion 0.05 MICROgram(s)/kG/Min IV Continuous <Continuous>  pantoprazole   Suspension 40 milliGRAM(s) Oral daily  predniSONE   Tablet 50 milliGRAM(s) Oral daily  propofol Infusion 10 MICROgram(s)/kG/Min IV Continuous <Continuous>    PRN MEDICATIONS  acetaminophen    Suspension .. 650 milliGRAM(s) Enteral Tube every 6 hours PRN  ALBUTerol   0.5% 2.5 milliGRAM(s) Nebulizer every 4 hours PRN    VITALS:   T(F): 98.8  HR: 76  BP: 132/66  RR: 24  SpO2: 97%    LABS:                        11.3   8.38  )-----------( 187      ( 13 Dec 2018 05:30 )             35.3     12-13    152<H>  |  105  |  32<H>  ----------------------------<  150<H>  3.7   |  36<H>  |  0.5<L>    Ca    8.3<L>      13 Dec 2018 05:30  Phos  3.5     12-13  Mg     2.4     12-13    TPro  5.3<L>  /  Alb  3.1<L>  /  TBili  0.5  /  DBili  x   /  AST  22  /  ALT  35  /  AlkPhos  76  12-13    PTT - ( 13 Dec 2018 05:15 )  PTT:85.1 sec    ABG - ( 13 Dec 2018 06:43 )  pH, Arterial: 7.45  pH, Blood: x     /  pCO2: 57    /  pO2: 100   / HCO3: 40    / Base Excess: 12.8  /  SaO2: 97                    Culture - Sputum (collected 10 Dec 2018 16:47)  Source: .Sputum Sputum  Gram Stain (10 Dec 2018 23:29):    Moderate polymorphonuclear leukocytes per low power field    No Squamous epithelial cells per low power field    No organisms seen  Final Report (12 Dec 2018 17:40):    No growth to date.    Culture - Urine (collected 10 Dec 2018 14:40)  Source: .Urine Catheterized  Final Report (11 Dec 2018 22:25):    No growth          RADIOLOGY:    PHYSICAL EXAM:  GEN: intubated & sedated  LUNGS: vent sounds b/l present  HEART: S1/S2 present. RRR.   ABD: Soft, non-tender, non-distended. Bowel sounds present  EXT: no LE edema  NEURO: sedated

## 2018-12-13 NOTE — PROGRESS NOTE ADULT - SUBJECTIVE AND OBJECTIVE BOX
Patient is a 58y old  Male who presents with a chief complaint of Septic Shock (13 Dec 2018 07:12)    OVER NIGHT EVENTS:  s/p CT-H overnight; On propofol now; During SAT responsive to commands;     PHYSICAL EXAM    ICU Vital Signs Last 24 Hrs  T(C): 37.7 (13 Dec 2018 07:05), Max: 37.7 (12 Dec 2018 23:29)  T(F): 99.9 (13 Dec 2018 07:05), Max: 99.9 (12 Dec 2018 23:29)  HR: 76 (13 Dec 2018 08:01) (60 - 85)  BP: 132/66 (13 Dec 2018 08:01) (94/53 - 138/78)  BP(mean): 92 (13 Dec 2018 08:01) (78 - 102)  RR: 33 (13 Dec 2018 09:00) (24 - 33)  SpO2: 97% (13 Dec 2018 08:01) (96% - 100%)    I&O's Detail    12 Dec 2018 07:01  -  13 Dec 2018 07:00  --------------------------------------------------------  IN:    amiodarone Infusion: 50.1 mL    heparin  Infusion.: 270 mL    insulin Infusion: 85 mL    propofol Infusion: 316.9 mL    Solution: 1000 mL    Solution: 150 mL    Vital High Protein: 960 mL  Total IN: 2832 mL    OUT:    Indwelling Catheter - Urethral: 2580 mL  Total OUT: 2580 mL    Total NET: 252 mL      13 Dec 2018 07:01  -  13 Dec 2018 10:45  --------------------------------------------------------  IN:    heparin  Infusion.: 54 mL    insulin Infusion: 5 mL    propofol Infusion: 39 mL    Vital High Protein: 180 mL  Total IN: 278 mL    OUT:    Indwelling Catheter - Urethral: 350 mL  Total OUT: 350 mL    Total NET: -72 mL      General: Comfortable in bed  HEENT:  ET +   Lymph node: No palpable LN             Lungs: CTA  Cardiovascular: RRR, S1S2  Abdomen: BS+ve; soft non tender  Extremities: No LE edema  Skin:  No evident Rash  Neurological: Sedated     LABS:                          11.3   8.38  )-----------( 187      ( 13 Dec 2018 05:30 )             35.3                                                 12-13    152<H>  |  105  |  32<H>  ----------------------------<  150<H>  3.7   |  36<H>  |  0.5<L>    Ca    8.3<L>      13 Dec 2018 05:30  Phos  3.5     12-13  Mg     2.4     12-13    TPro  5.3<L>  /  Alb  3.1<L>  /  TBili  0.5  /  DBili  x   /  AST  22  /  ALT  35  /  AlkPhos  76  12-13    PTT - ( 13 Dec 2018 05:15 )  PTT:85.1 sec                                           LIVER FUNCTIONS - ( 13 Dec 2018 05:30 )  Alb: 3.1 g/dL / Pro: 5.3 g/dL / ALK PHOS: 76 U/L / ALT: 35 U/L / AST: 22 U/L / GGT: x               Lactate (12-09-18 @ 06:48): 0.7  Lactate (12-08-18 @ 21:54): 1.4    Culture - Sputum (collected 10 Dec 2018 16:47)  Source: .Sputum Sputum  Gram Stain (10 Dec 2018 23:29):    Moderate polymorphonuclear leukocytes per low power field    No Squamous epithelial cells per low power field    No organisms seen  Final Report (12 Dec 2018 17:40):    No growth to date.    Culture - Urine (collected 10 Dec 2018 14:40)  Source: .Urine Catheterized  Final Report (11 Dec 2018 22:25):    No growth             Mode: Auto Mode: PRVC/ Volume Support  RR (machine): 24  TV (machine): 450  FiO2: 60  PEEP: 8  MAP: 12  PIP: 21                                        ABG - ( 13 Dec 2018 06:43 )  pH, Arterial: 7.45  pH, Blood: x     /  pCO2: 57    /  pO2: 100   / HCO3: 40    / Base Excess: 12.8  /  SaO2: 97          MEDICATIONS  (STANDING):  amiodarone    Tablet 200 milliGRAM(s) Oral daily  ARIPiprazole 20 milliGRAM(s) Oral daily  chlorhexidine 0.12% Liquid 15 milliLiter(s) Oral Mucosa two times a day  chlorhexidine 4% Liquid 1 Application(s) Topical <User Schedule>  dextrose 50% Injectable 50 milliLiter(s) IV Push every 15 minutes  dextrose 50% Injectable 25 milliLiter(s) IV Push every 15 minutes  fentaNYL   Infusion 0.5 MICROgram(s)/kG/Hr (4.99 mL/Hr) IV Continuous <Continuous>  heparin  Infusion.  Unit(s)/Hr (18 mL/Hr) IV Continuous <Continuous>  insulin Infusion 2 Unit(s)/Hr (2 mL/Hr) IV Continuous <Continuous>  lactulose Syrup 20 Gram(s) Oral four times a day  meropenem  IVPB      meropenem  IVPB 1000 milliGRAM(s) IV Intermittent every 8 hours  midazolam Infusion 0.02 mG/kG/Hr (1.996 mL/Hr) IV Continuous <Continuous>  norepinephrine Infusion 0.05 MICROgram(s)/kG/Min (9.356 mL/Hr) IV Continuous <Continuous>  pantoprazole  Injectable 40 milliGRAM(s) IV Push daily  predniSONE   Tablet 50 milliGRAM(s) Oral daily  propofol Infusion 10 MICROgram(s)/kG/Min (5.802 mL/Hr) IV Continuous <Continuous>  vancomycin  IVPB      vancomycin  IVPB 1500 milliGRAM(s) IV Intermittent every 12 hours    MEDICATIONS  (PRN):  acetaminophen    Suspension .. 650 milliGRAM(s) Enteral Tube every 6 hours PRN Temp greater or equal to 38C (100.4F)  ALBUTerol   0.5% 2.5 milliGRAM(s) Nebulizer every 4 hours PRN Shortness of Breath and/or Wheezing    Radiology:  Cxr: ETT ok; Persistent bilateral basilar opacities

## 2018-12-14 LAB
ALBUMIN SERPL ELPH-MCNC: 1.8 G/DL — LOW (ref 3.5–5.2)
ALP SERPL-CCNC: 45 U/L — SIGNIFICANT CHANGE UP (ref 30–115)
ALT FLD-CCNC: 19 U/L — SIGNIFICANT CHANGE UP (ref 0–41)
ANION GAP SERPL CALC-SCNC: 7 MMOL/L — SIGNIFICANT CHANGE UP (ref 7–14)
ANION GAP SERPL CALC-SCNC: 8 MMOL/L — SIGNIFICANT CHANGE UP (ref 7–14)
APTT BLD: 31 SEC — SIGNIFICANT CHANGE UP (ref 27–39.2)
AST SERPL-CCNC: 11 U/L — SIGNIFICANT CHANGE UP (ref 0–41)
BASE EXCESS BLDA CALC-SCNC: 12.1 MMOL/L — HIGH (ref -2–2)
BASE EXCESS BLDA CALC-SCNC: 12.8 MMOL/L — HIGH (ref -2–2)
BASOPHILS # BLD AUTO: 0.01 K/UL — SIGNIFICANT CHANGE UP (ref 0–0.2)
BASOPHILS NFR BLD AUTO: 0.2 % — SIGNIFICANT CHANGE UP (ref 0–1)
BILIRUB SERPL-MCNC: 0.3 MG/DL — SIGNIFICANT CHANGE UP (ref 0.2–1.2)
BUN SERPL-MCNC: 22 MG/DL — HIGH (ref 10–20)
BUN SERPL-MCNC: 34 MG/DL — HIGH (ref 10–20)
CALCIUM SERPL-MCNC: 5.5 MG/DL — CRITICAL LOW (ref 8.5–10.1)
CALCIUM SERPL-MCNC: 8.7 MG/DL — SIGNIFICANT CHANGE UP (ref 8.5–10.1)
CHLORIDE SERPL-SCNC: 102 MMOL/L — SIGNIFICANT CHANGE UP (ref 98–110)
CHLORIDE SERPL-SCNC: 115 MMOL/L — HIGH (ref 98–110)
CO2 SERPL-SCNC: 27 MMOL/L — SIGNIFICANT CHANGE UP (ref 17–32)
CO2 SERPL-SCNC: 38 MMOL/L — HIGH (ref 17–32)
CREAT SERPL-MCNC: 0.5 MG/DL — LOW (ref 0.7–1.5)
CREAT SERPL-MCNC: <0.5 MG/DL — LOW (ref 0.7–1.5)
CULTURE RESULTS: SIGNIFICANT CHANGE UP
EOSINOPHIL # BLD AUTO: 0.33 K/UL — SIGNIFICANT CHANGE UP (ref 0–0.7)
EOSINOPHIL NFR BLD AUTO: 6.9 % — SIGNIFICANT CHANGE UP (ref 0–8)
GLUCOSE BLDC GLUCOMTR-MCNC: 120 MG/DL — HIGH (ref 70–99)
GLUCOSE BLDC GLUCOMTR-MCNC: 125 MG/DL — HIGH (ref 70–99)
GLUCOSE BLDC GLUCOMTR-MCNC: 147 MG/DL — HIGH (ref 70–99)
GLUCOSE BLDC GLUCOMTR-MCNC: 151 MG/DL — HIGH (ref 70–99)
GLUCOSE BLDC GLUCOMTR-MCNC: 156 MG/DL — HIGH (ref 70–99)
GLUCOSE BLDC GLUCOMTR-MCNC: 159 MG/DL — HIGH (ref 70–99)
GLUCOSE BLDC GLUCOMTR-MCNC: 163 MG/DL — HIGH (ref 70–99)
GLUCOSE BLDC GLUCOMTR-MCNC: 165 MG/DL — HIGH (ref 70–99)
GLUCOSE BLDC GLUCOMTR-MCNC: 166 MG/DL — HIGH (ref 70–99)
GLUCOSE BLDC GLUCOMTR-MCNC: 183 MG/DL — HIGH (ref 70–99)
GLUCOSE SERPL-MCNC: 194 MG/DL — HIGH (ref 70–99)
GLUCOSE SERPL-MCNC: 94 MG/DL — SIGNIFICANT CHANGE UP (ref 70–99)
HCO3 BLDA-SCNC: 39 MMOL/L — HIGH (ref 23–27)
HCO3 BLDA-SCNC: 39 MMOL/L — HIGH (ref 23–27)
HCT VFR BLD CALC: 21.6 % — LOW (ref 42–52)
HCT VFR BLD CALC: 40.8 % — LOW (ref 42–52)
HGB BLD-MCNC: 13.3 G/DL — LOW (ref 14–18)
HGB BLD-MCNC: 6.8 G/DL — CRITICAL LOW (ref 14–18)
HOROWITZ INDEX BLDA+IHG-RTO: 50 — SIGNIFICANT CHANGE UP
HOROWITZ INDEX BLDA+IHG-RTO: 50 — SIGNIFICANT CHANGE UP
IMM GRANULOCYTES NFR BLD AUTO: 0.4 % — HIGH (ref 0.1–0.3)
LYMPHOCYTES # BLD AUTO: 0.7 K/UL — LOW (ref 1.2–3.4)
LYMPHOCYTES # BLD AUTO: 14.7 % — LOW (ref 20.5–51.1)
MAGNESIUM SERPL-MCNC: 1.5 MG/DL — LOW (ref 1.8–2.4)
MAGNESIUM SERPL-MCNC: 2.3 MG/DL — SIGNIFICANT CHANGE UP (ref 1.8–2.4)
MCHC RBC-ENTMCNC: 30.4 PG — SIGNIFICANT CHANGE UP (ref 27–31)
MCHC RBC-ENTMCNC: 30.6 PG — SIGNIFICANT CHANGE UP (ref 27–31)
MCHC RBC-ENTMCNC: 31.5 G/DL — LOW (ref 32–37)
MCHC RBC-ENTMCNC: 32.6 G/DL — SIGNIFICANT CHANGE UP (ref 32–37)
MCV RBC AUTO: 93.8 FL — SIGNIFICANT CHANGE UP (ref 80–94)
MCV RBC AUTO: 96.4 FL — HIGH (ref 80–94)
MONOCYTES # BLD AUTO: 0.33 K/UL — SIGNIFICANT CHANGE UP (ref 0.1–0.6)
MONOCYTES NFR BLD AUTO: 6.9 % — SIGNIFICANT CHANGE UP (ref 1.7–9.3)
NEUTROPHILS # BLD AUTO: 3.36 K/UL — SIGNIFICANT CHANGE UP (ref 1.4–6.5)
NEUTROPHILS NFR BLD AUTO: 70.9 % — SIGNIFICANT CHANGE UP (ref 42.2–75.2)
NRBC # BLD: 0 /100 WBCS — SIGNIFICANT CHANGE UP (ref 0–0)
PCO2 BLDA: 53 MMHG — HIGH (ref 38–42)
PCO2 BLDA: 59 MMHG — HIGH (ref 38–42)
PH BLDA: 7.43 — HIGH (ref 7.38–7.42)
PH BLDA: 7.47 — HIGH (ref 7.38–7.42)
PHOSPHATE SERPL-MCNC: 1.7 MG/DL — LOW (ref 2.1–4.9)
PHOSPHATE SERPL-MCNC: 3 MG/DL — SIGNIFICANT CHANGE UP (ref 2.1–4.9)
PLATELET # BLD AUTO: 112 K/UL — LOW (ref 130–400)
PLATELET # BLD AUTO: 213 K/UL — SIGNIFICANT CHANGE UP (ref 130–400)
PO2 BLDA: 89 MMHG — SIGNIFICANT CHANGE UP (ref 78–95)
PO2 BLDA: 90 MMHG — SIGNIFICANT CHANGE UP (ref 78–95)
POTASSIUM SERPL-MCNC: 2.5 MMOL/L — CRITICAL LOW (ref 3.5–5)
POTASSIUM SERPL-MCNC: 3.9 MMOL/L — SIGNIFICANT CHANGE UP (ref 3.5–5)
POTASSIUM SERPL-SCNC: 2.5 MMOL/L — CRITICAL LOW (ref 3.5–5)
POTASSIUM SERPL-SCNC: 3.9 MMOL/L — SIGNIFICANT CHANGE UP (ref 3.5–5)
PROT SERPL-MCNC: 3.4 G/DL — LOW (ref 6–8)
RBC # BLD: 2.24 M/UL — LOW (ref 4.7–6.1)
RBC # BLD: 4.35 M/UL — LOW (ref 4.7–6.1)
RBC # FLD: 13.7 % — SIGNIFICANT CHANGE UP (ref 11.5–14.5)
RBC # FLD: 14 % — SIGNIFICANT CHANGE UP (ref 11.5–14.5)
SAO2 % BLDA: 96 % — SIGNIFICANT CHANGE UP (ref 94–98)
SAO2 % BLDA: 98 % — SIGNIFICANT CHANGE UP (ref 94–98)
SODIUM SERPL-SCNC: 147 MMOL/L — HIGH (ref 135–146)
SODIUM SERPL-SCNC: 150 MMOL/L — HIGH (ref 135–146)
SPECIMEN SOURCE: SIGNIFICANT CHANGE UP
WBC # BLD: 11.38 K/UL — HIGH (ref 4.8–10.8)
WBC # BLD: 4.75 K/UL — LOW (ref 4.8–10.8)
WBC # FLD AUTO: 11.38 K/UL — HIGH (ref 4.8–10.8)
WBC # FLD AUTO: 4.75 K/UL — LOW (ref 4.8–10.8)

## 2018-12-14 RX ADMIN — PROPOFOL 5.8 MICROGRAM(S)/KG/MIN: 10 INJECTION, EMULSION INTRAVENOUS at 09:26

## 2018-12-14 RX ADMIN — PANTOPRAZOLE SODIUM 40 MILLIGRAM(S): 20 TABLET, DELAYED RELEASE ORAL at 11:53

## 2018-12-14 RX ADMIN — CHLORHEXIDINE GLUCONATE 1 APPLICATION(S): 213 SOLUTION TOPICAL at 06:38

## 2018-12-14 RX ADMIN — CHLORHEXIDINE GLUCONATE 15 MILLILITER(S): 213 SOLUTION TOPICAL at 17:37

## 2018-12-14 RX ADMIN — CHLORHEXIDINE GLUCONATE 15 MILLILITER(S): 213 SOLUTION TOPICAL at 05:51

## 2018-12-14 RX ADMIN — PROPOFOL 5.8 MICROGRAM(S)/KG/MIN: 10 INJECTION, EMULSION INTRAVENOUS at 17:14

## 2018-12-14 RX ADMIN — AMIODARONE HYDROCHLORIDE 200 MILLIGRAM(S): 400 TABLET ORAL at 05:51

## 2018-12-14 RX ADMIN — MEROPENEM 100 MILLIGRAM(S): 1 INJECTION INTRAVENOUS at 21:18

## 2018-12-14 RX ADMIN — Medication 50 MILLIGRAM(S): at 05:51

## 2018-12-14 RX ADMIN — MEROPENEM 100 MILLIGRAM(S): 1 INJECTION INTRAVENOUS at 13:47

## 2018-12-14 RX ADMIN — INSULIN HUMAN 2 UNIT(S)/HR: 100 INJECTION, SOLUTION SUBCUTANEOUS at 10:15

## 2018-12-14 RX ADMIN — ENOXAPARIN SODIUM 100 MILLIGRAM(S): 100 INJECTION SUBCUTANEOUS at 05:51

## 2018-12-14 RX ADMIN — ENOXAPARIN SODIUM 100 MILLIGRAM(S): 100 INJECTION SUBCUTANEOUS at 17:37

## 2018-12-14 RX ADMIN — PROPOFOL 5.8 MICROGRAM(S)/KG/MIN: 10 INJECTION, EMULSION INTRAVENOUS at 06:38

## 2018-12-14 RX ADMIN — ARIPIPRAZOLE 20 MILLIGRAM(S): 15 TABLET ORAL at 11:54

## 2018-12-14 RX ADMIN — MEROPENEM 100 MILLIGRAM(S): 1 INJECTION INTRAVENOUS at 05:50

## 2018-12-14 NOTE — PROGRESS NOTE ADULT - SUBJECTIVE AND OBJECTIVE BOX
Patient is a 58y old  Male who presents with a chief complaint of Septic Shock (14 Dec 2018 07:08)      T(F): 99.3 (12-14-18 @ 03:00), Max: 100.4 (12-13-18 @ 22:45)  HR: 59 (12-14-18 @ 05:00)  BP: 116/61 (12-14-18 @ 05:00)  RR: 23 (12-14-18 @ 05:00)  SpO2: 99% (12-14-18 @ 05:00) (96% - 100%)    PHYSICAL EXAM:  GENERAL: NAD, well-groomed, well-developed  HEAD:  Atraumatic, Normocephalic  EYES: EOMI, PERRLA, conjunctiva and sclera clear  ENMT: No tonsillar erythema, exudates, or enlargement; Moist mucous membranes, Good dentition, No lesions  NECK: Supple, No JVD, Normal thyroid  NERVOUS SYSTEM:  Alert & Oriented X3,  Motor Strength 5/5 B/L upper and lower extremities  CHEST/LUNG: Clear to percussion bilaterally; No rales, rhonchi, wheezing, or rubs  HEART: Regular rate and rhythm; No murmurs, rubs, or gallops  ABDOMEN: Soft, Nontender, Nondistended; Bowel sounds present  EXTREMITIES:   No clubbing, cyanosis, or edema  LYMPH: No lymphadenopathy noted  SKIN: No rashes or lesions    labs  12-13    152<H>  |  105  |  32<H>  ----------------------------<  150<H>  3.7   |  36<H>  |  0.5<L>    Ca    8.3<L>      13 Dec 2018 05:30  Phos  3.5     12-13  Mg     2.4     12-13    TPro  5.3<L>  /  Alb  3.1<L>  /  TBili  0.5  /  DBili  x   /  AST  22  /  ALT  35  /  AlkPhos  76  12-13                          13.0   7.54  )-----------( 230      ( 13 Dec 2018 12:44 )             40.4       PTT - ( 14 Dec 2018 06:20 )  PTT:31.0 sec  Mode: Auto Mode: PRVC/ Volume Support  RR (machine): 22  TV (machine): 450  FiO2: 50  PEEP: 8  MAP: 13  PIP: 22        acetaminophen    Suspension .. 650 milliGRAM(s) Enteral Tube every 6 hours PRN  ALBUTerol   0.5% 2.5 milliGRAM(s) Nebulizer every 4 hours PRN  amiodarone    Tablet 200 milliGRAM(s) Oral daily  ARIPiprazole 20 milliGRAM(s) Oral daily  chlorhexidine 0.12% Liquid 15 milliLiter(s) Oral Mucosa two times a day  chlorhexidine 4% Liquid 1 Application(s) Topical <User Schedule>  dextrose 50% Injectable 50 milliLiter(s) IV Push every 15 minutes  dextrose 50% Injectable 25 milliLiter(s) IV Push every 15 minutes  enoxaparin Injectable 100 milliGRAM(s) SubCutaneous <User Schedule>  fentaNYL   Infusion 0.5 MICROgram(s)/kG/Hr IV Continuous <Continuous>  insulin Infusion 2 Unit(s)/Hr IV Continuous <Continuous>  meropenem  IVPB      meropenem  IVPB 1000 milliGRAM(s) IV Intermittent every 8 hours  midazolam Infusion 0.02 mG/kG/Hr IV Continuous <Continuous>  norepinephrine Infusion 0.05 MICROgram(s)/kG/Min IV Continuous <Continuous>  pantoprazole   Suspension 40 milliGRAM(s) Oral daily  predniSONE   Tablet 50 milliGRAM(s) Oral daily  propofol Infusion 10 MICROgram(s)/kG/Min IV Continuous <Continuous>

## 2018-12-14 NOTE — PROGRESS NOTE ADULT - ATTENDING COMMENTS
Attending Statement: I have personally performed a face to face diagnostic evaluation on this patient. I have reviewed the above note and agree with the history, exam and plan of care, except as I have noted in the text.

## 2018-12-14 NOTE — PROGRESS NOTE ADULT - ASSESSMENT
IMPRESSION:  Acute on chronic hypercapnic respiratory failure on MV  Septic shock secondary to aspiration PNA  Pulmonary edema secondary to HfrEF  HO Schizophrenia  Smoker  hypernatremia    PLAN:    CNS: Daily SAT; Propofol for sedation ;     HEENT: Oral care    PULMONARY: HOB at 45 degrees; c/w prednisone; Vent changes:  RR 24  PEEP 5; Fio2 50 %;     CARDIOVASCULAR: Keep I<O; FU with cardio;      GI: GI prophylaxis.  Feeding as tolerated;    RENAL:  FU lytes; free water     INFECTIOUS DISEASE: Panculture; c/w rex    HEMATOLOGICAL:  DVT prophylaxis lovenox therapeutic     ENDOCRINE:  Follow up FS.  Insulin protocol if needed.    MICU monitoring   D/C TLC;

## 2018-12-14 NOTE — PROGRESS NOTE ADULT - ASSESSMENT
58/M hx of DM & HTN & schizophrenia?  presented to ED with SOB with orthopnea. Became hypotensive in ED after IV NTG was given for elevated BP. Required intubation and pressor support. Admit to CCU for Acute respiratory failure from Septic Shock requiring intubation and pressor support. CXR concerning for LLL infiltrate - PNA? vs ARDS?    Acute on chronic hypercapnic respiratory failure on MV  Septic shock secondary to aspiration PNA  Pulmonary edema secondary to CHF  HO Schizophrenia  Smoker    [X] TLC  [X] Nichole  [X] intubated  [  ] Sedated  [   ] Pressor    Problem List  Acute on chronic hypercapnic respiratory failure on MV  Septic shock secondary to aspiration PNA - off pressors   Pulmonary edema secondary to HFrEF  HO Schizophrenia  Smoker    - RVP negative  - UA - trace leuks; neg nitrates; Ur Cx neg  - Sputum Cx neg  - Bld cx -neg (12/9)  - CE 0.04>0.05>0.02  - rEEG: Abnormal due to the presence of: generalized slowing as above  - MRSA negative  - All cx negative    # Septic Shock due to aspiration PNA & Acute on chronic hypercapneic respiratory failure  - Intubated & Vented   - Off pressors  - C/w meropenem  - Prednisone PO daily (12/10 start)   - CTH neg, EEG - gen. slowing    # Pulmonary edema secondary to CHF   - TTE (12/10) - mod impaired function 30-35%, LV dilated, LV enlarged, mod MR, mild TR, LA dilation, small pericardial effusion, RV pressure 31  - Will need ACEI/ARB & B-blocker; hold for now     # New AF (CHADSVASC-3) now converted to sinus  - PO Amiodarone   - CTH negative  - LMWH for a/c    # Drop in Hb - repeat now & T/S  - Had BM today - non-bloody, non-black    # Hypernatremia - start free water  - Free water deficit 3.2L; start 400cc free water q6Hr    # DM - insulin infusion  # Hx of HTN - hold BP meds  # Schizophrenia - on ariprazole  # DVT/GI ppx     Full Code 58/M hx of DM & HTN & schizophrenia?  presented to ED with SOB with orthopnea. Became hypotensive in ED after IV NTG was given for elevated BP. Required intubation and pressor support. Admit to CCU for Acute respiratory failure from Septic Shock requiring intubation and pressor support. CXR concerning for LLL infiltrate - PNA? vs ARDS?    Acute on chronic hypercapnic respiratory failure on MV  Septic shock secondary to aspiration PNA  Pulmonary edema secondary to CHF  HO Schizophrenia  Smoker    [X] TLC  [X] Inchole  [X] intubated  [  ] Sedated  [   ] Pressor    Problem List  Acute on chronic hypercapnic respiratory failure on MV  Septic shock secondary to aspiration PNA - off pressors   Pulmonary edema secondary to HFrEF  HO Schizophrenia  Smoker    - RVP negative  - UA - trace leuks; neg nitrates; Ur Cx neg  - Sputum Cx neg  - Bld cx -neg (12/9)  - CE 0.04>0.05>0.02  - rEEG: Abnormal due to the presence of: generalized slowing as above  - MRSA negative  - All cx negative    # Septic Shock due to aspiration PNA & Acute on chronic hypercapneic respiratory failure  - Intubated & Vented   - Off pressors  - C/w meropenem  - Prednisone PO daily (12/10 start)   - CTH neg, EEG - gen. slowing    # Pulmonary edema secondary to CHF   - TTE (12/10) - mod impaired function 30-35%, LV dilated, LV enlarged, mod MR, mild TR, LA dilation, small pericardial effusion, RV pressure 31  - Will need ACEI/ARB & B-blocker; hold for now     # Episode non-sustained VTach - check lytes & replete  - Will need B-blocker in view of HFrEF; hold for now inview of borderline BP    # New AF (CHADSVASC-3) now converted to sinus  - PO Amiodarone   - CTH negative  - LMWH for a/c    # Drop in Hb - repeat now & T/S  - Had BM today - non-bloody, non-black    # Hypernatremia - start free water  - Free water deficit 3.2L; start 400cc free water q6Hr    # DM - insulin infusion  # Hx of HTN - hold BP meds  # Schizophrenia - on ariprazole  # DVT/GI ppx     Full Code 58/M hx of DM & HTN & schizophrenia?  presented to ED with SOB with orthopnea. Became hypotensive in ED after IV NTG was given for elevated BP. Required intubation and pressor support. Admit to CCU for Acute respiratory failure from Septic Shock requiring intubation and pressor support. CXR concerning for LLL infiltrate - PNA? vs ARDS?    Acute on chronic hypercapnic respiratory failure on MV  Septic shock secondary to aspiration PNA  Pulmonary edema secondary to CHF  HO Schizophrenia  Smoker    [X] TLC  [X] Nichole  [X] intubated  [  ] Sedated  [   ] Pressor    Problem List  Acute on chronic hypercapnic respiratory failure on MV  Septic shock secondary to aspiration PNA - off pressors   Pulmonary edema secondary to HFrEF  HO Schizophrenia  Smoker    - RVP negative  - UA - trace leuks; neg nitrates; Ur Cx neg  - Sputum Cx neg  - Bld cx -neg (12/9)  - CE 0.04>0.05>0.02  - rEEG: Abnormal due to the presence of: generalized slowing as above  - MRSA negative  - All cx negative    # Septic Shock due to aspiration PNA & Acute on chronic hypercapneic respiratory failure - improving  - Intubated & Vented   - Off pressors  - C/w meropenem  - Prednisone PO daily (12/10 start)   - CTH neg, EEG - gen. slowing    # Pulmonary edema secondary to CHF - improved  - TTE (12/10) - mod impaired function 30-35%, LV dilated, LV enlarged, mod MR, mild TR, LA dilation, small pericardial effusion, RV pressure 31  - Will need ACEI/ARB & B-blocker; hold for now     # Episode non-sustained VTach - check lytes & replete  - Will need B-blocker in view of HFrEF; hold for now inview of borderline BP    # New AF (CHADSVASC-3) now converted to sinus  - PO Amiodarone   - CTH negative  - LMWH for a/c    # Hypernatremia - c/w free water; improving  - Free water deficit 3.2L; start 400cc free water q6Hr    # DM - insulin infusion  # Hx of HTN - hold BP meds  # Schizophrenia - on ariprazole  # DVT/GI ppx     Full Code

## 2018-12-14 NOTE — PROGRESS NOTE ADULT - ASSESSMENT
Patient mildly sedated on vent. Echo EF 30 -35 %. Will need beta ace or arb when stable. Now in NSR. PO amiodarone . Lovenox for now. When off sedation not appropriate. . ? try hold sedation again today. Brief vtach. Check lytes mg.  Prognosis guarded

## 2018-12-14 NOTE — PROGRESS NOTE ADULT - SUBJECTIVE AND OBJECTIVE BOX
Patient is a 58y old  Male who presents with a chief complaint of Septic Shock (14 Dec 2018 07:13)      OVER NIGHT EVENTS:  No events overnight; Off pressors; Propofol for sedation;     PHYSICAL EXAM    ICU Vital Signs Last 24 Hrs  T(C): 37.1 (14 Dec 2018 07:07), Max: 38 (13 Dec 2018 22:45)  T(F): 98.8 (14 Dec 2018 07:07), Max: 100.4 (13 Dec 2018 22:45)  HR: 93 (14 Dec 2018 09:00) (55 - 93)  BP: 116/58 (14 Dec 2018 09:00) (108/59 - 142/73)  BP(mean): 81 (14 Dec 2018 09:00) (78 - 100)  RR: 22 (14 Dec 2018 09:00) (16 - 36)  SpO2: 98% (14 Dec 2018 09:00) (96% - 100%)    I&O's Detail    13 Dec 2018 07:01  -  14 Dec 2018 07:00  --------------------------------------------------------  IN:    Enteral Tube Flush: 370 mL    Free Water: 800 mL    heparin  Infusion.: 189 mL    insulin Infusion: 81 mL    Miscellaneous Tube Feeding: 10 mL    propofol Infusion: 694 mL    Solution: 100 mL    Vital High Protein: 905 mL  Total IN: 3149 mL    OUT:    Indwelling Catheter - Urethral: 1410 mL  Total OUT: 1410 mL    Total NET: 1739 mL    14 Dec 2018 07:01  -  14 Dec 2018 11:01  --------------------------------------------------------  IN:    Free Water: 400 mL    insulin Infusion: 12 mL    propofol Infusion: 112 mL    Vital High Protein: 100 mL  Total IN: 624 mL    OUT:    Indwelling Catheter - Urethral: 135 mL  Total OUT: 135 mL    Total NET: 489 mL    General: Comfortable in bed  HEENT:  ETT+   Lymph node: No palpable LN             Lungs: DEVYN over bases  Cardiovascular: RRR, S1S2  Abdomen: BS+ve; soft non tender  Extremities: No LE edema  Skin:  No evident Rash  Neurological:  Responds to simple commands off sedation      LABS:                          13.3   11.38 )-----------( 213      ( 14 Dec 2018 08:00 )             40.8                                               12-14    147<H>  |  102  |  34<H>  ----------------------------<  194<H>  3.9   |  38<H>  |  0.5<L>    Ca    8.7      14 Dec 2018 08:00  Phos  3.0     12-14  Mg     2.3     12-14    TPro  3.4<L>  /  Alb  1.8<L>  /  TBili  0.3  /  DBili  x   /  AST  11  /  ALT  19  /  AlkPhos  45  12-14    PTT - ( 14 Dec 2018 06:20 )  PTT:31.0 sec                                           LIVER FUNCTIONS - ( 14 Dec 2018 06:20 )  Alb: 1.8 g/dL / Pro: 3.4 g/dL / ALK PHOS: 45 U/L / ALT: 19 U/L / AST: 11 U/L / GGT: x               Mode: Auto Mode: PRVC/ Volume Support  RR (machine): 22  TV (machine): 450  FiO2: 50  PEEP: 8  MAP: 12  PIP: 18                                        ABG - ( 14 Dec 2018 06:38 )  pH, Arterial: 7.43  pH, Blood: x     /  pCO2: 59    /  pO2: 90    / HCO3: 39    / Base Excess: 12.1  /  SaO2: 98          MEDICATIONS  (STANDING):  amiodarone    Tablet 200 milliGRAM(s) Oral daily  ARIPiprazole 20 milliGRAM(s) Oral daily  chlorhexidine 0.12% Liquid 15 milliLiter(s) Oral Mucosa two times a day  chlorhexidine 4% Liquid 1 Application(s) Topical <User Schedule>  dextrose 50% Injectable 50 milliLiter(s) IV Push every 15 minutes  dextrose 50% Injectable 25 milliLiter(s) IV Push every 15 minutes  enoxaparin Injectable 100 milliGRAM(s) SubCutaneous <User Schedule>  fentaNYL   Infusion 0.5 MICROgram(s)/kG/Hr (4.99 mL/Hr) IV Continuous <Continuous>  insulin Infusion 2 Unit(s)/Hr (2 mL/Hr) IV Continuous <Continuous>  meropenem  IVPB      meropenem  IVPB 1000 milliGRAM(s) IV Intermittent every 8 hours  midazolam Infusion 0.02 mG/kG/Hr (1.996 mL/Hr) IV Continuous <Continuous>  norepinephrine Infusion 0.05 MICROgram(s)/kG/Min (9.356 mL/Hr) IV Continuous <Continuous>  pantoprazole   Suspension 40 milliGRAM(s) Oral daily  predniSONE   Tablet 50 milliGRAM(s) Oral daily  propofol Infusion 10 MICROgram(s)/kG/Min (5.802 mL/Hr) IV Continuous <Continuous>    MEDICATIONS  (PRN):  acetaminophen    Suspension .. 650 milliGRAM(s) Enteral Tube every 6 hours PRN Temp greater or equal to 38C (100.4F)  ALBUTerol   0.5% 2.5 milliGRAM(s) Nebulizer every 4 hours PRN Shortness of Breath and/or Wheezing    Radiology:  Cxr: ETT ok; Right TLC; Improved aeration of left base opacity

## 2018-12-14 NOTE — PROGRESS NOTE ADULT - SUBJECTIVE AND OBJECTIVE BOX
SUBJECTIVE:    Patient is a 58y old Male who presents with a chief complaint of Septic Shock (14 Dec 2018 11:01)    Currently admitted to medicine with the primary diagnosis of Acute respiratory failure with hypoxia and hypercapnia     Today is hospital day 6d. This morning he is resting comfortably in bed and reports no new issues or overnight events.     PAST MEDICAL & SURGICAL HISTORY  ETOH abuse  HLD (hyperlipidemia)  Hypertension, unspecified type  COPD (chronic obstructive pulmonary disease)  Schizophrenia  Psych & behavrl factors assoc w disord or dis classd elswhr  Diabetes  No significant past surgical history    SOCIAL HISTORY:  Negative for smoking/alcohol/drug use.     ALLERGIES:  No Known Allergies    MEDICATIONS:  STANDING MEDICATIONS  amiodarone    Tablet 200 milliGRAM(s) Oral daily  ARIPiprazole 20 milliGRAM(s) Oral daily  chlorhexidine 0.12% Liquid 15 milliLiter(s) Oral Mucosa two times a day  chlorhexidine 4% Liquid 1 Application(s) Topical <User Schedule>  dextrose 50% Injectable 50 milliLiter(s) IV Push every 15 minutes  dextrose 50% Injectable 25 milliLiter(s) IV Push every 15 minutes  enoxaparin Injectable 100 milliGRAM(s) SubCutaneous <User Schedule>  fentaNYL   Infusion 0.5 MICROgram(s)/kG/Hr IV Continuous <Continuous>  insulin Infusion 2 Unit(s)/Hr IV Continuous <Continuous>  meropenem  IVPB      meropenem  IVPB 1000 milliGRAM(s) IV Intermittent every 8 hours  midazolam Infusion 0.02 mG/kG/Hr IV Continuous <Continuous>  norepinephrine Infusion 0.05 MICROgram(s)/kG/Min IV Continuous <Continuous>  pantoprazole   Suspension 40 milliGRAM(s) Oral daily  predniSONE   Tablet 50 milliGRAM(s) Oral daily  propofol Infusion 10 MICROgram(s)/kG/Min IV Continuous <Continuous>    PRN MEDICATIONS  acetaminophen    Suspension .. 650 milliGRAM(s) Enteral Tube every 6 hours PRN  ALBUTerol   0.5% 2.5 milliGRAM(s) Nebulizer every 4 hours PRN    VITALS:   T(F): 97.2  HR: 93  BP: 116/58  RR: 22  SpO2: 98%    LABS:                        13.3   11.38 )-----------( 213      ( 14 Dec 2018 08:00 )             40.8     12-14    147<H>  |  102  |  34<H>  ----------------------------<  194<H>  3.9   |  38<H>  |  0.5<L>    Ca    8.7      14 Dec 2018 08:00  Phos  3.0     12-14  Mg     2.3     12-14    TPro  3.4<L>  /  Alb  1.8<L>  /  TBili  0.3  /  DBili  x   /  AST  11  /  ALT  19  /  AlkPhos  45  12-14    PTT - ( 14 Dec 2018 06:20 )  PTT:31.0 sec    ABG - ( 14 Dec 2018 06:38 )  pH, Arterial: 7.43  pH, Blood: x     /  pCO2: 59    /  pO2: 90    / HCO3: 39    / Base Excess: 12.1  /  SaO2: 98                        RADIOLOGY:    PHYSICAL EXAM:  GEN: No acute distress  LUNGS: Clear to auscultation bilaterally   HEART: S1/S2 present. RRR.   ABD: Soft, non-tender, non-distended. Bowel sounds present  EXT: NC/NC/NE/2+PP/PUENTE  NEURO: AAOX3

## 2018-12-15 LAB
ALBUMIN SERPL ELPH-MCNC: 3.4 G/DL — LOW (ref 3.5–5.2)
ALP SERPL-CCNC: 82 U/L — SIGNIFICANT CHANGE UP (ref 30–115)
ALT FLD-CCNC: 30 U/L — SIGNIFICANT CHANGE UP (ref 0–41)
ANION GAP SERPL CALC-SCNC: 11 MMOL/L — SIGNIFICANT CHANGE UP (ref 7–14)
AST SERPL-CCNC: 21 U/L — SIGNIFICANT CHANGE UP (ref 0–41)
BASOPHILS # BLD AUTO: 0.04 K/UL — SIGNIFICANT CHANGE UP (ref 0–0.2)
BASOPHILS NFR BLD AUTO: 0.4 % — SIGNIFICANT CHANGE UP (ref 0–1)
BILIRUB SERPL-MCNC: 0.5 MG/DL — SIGNIFICANT CHANGE UP (ref 0.2–1.2)
BUN SERPL-MCNC: 27 MG/DL — HIGH (ref 10–20)
CALCIUM SERPL-MCNC: 8.8 MG/DL — SIGNIFICANT CHANGE UP (ref 8.5–10.1)
CHLORIDE SERPL-SCNC: 99 MMOL/L — SIGNIFICANT CHANGE UP (ref 98–110)
CO2 SERPL-SCNC: 36 MMOL/L — HIGH (ref 17–32)
CREAT SERPL-MCNC: 0.5 MG/DL — LOW (ref 0.7–1.5)
EOSINOPHIL # BLD AUTO: 0.4 K/UL — SIGNIFICANT CHANGE UP (ref 0–0.7)
EOSINOPHIL NFR BLD AUTO: 3.7 % — SIGNIFICANT CHANGE UP (ref 0–8)
GLUCOSE BLDC GLUCOMTR-MCNC: 119 MG/DL — HIGH (ref 70–99)
GLUCOSE BLDC GLUCOMTR-MCNC: 153 MG/DL — HIGH (ref 70–99)
GLUCOSE BLDC GLUCOMTR-MCNC: 163 MG/DL — HIGH (ref 70–99)
GLUCOSE BLDC GLUCOMTR-MCNC: 230 MG/DL — HIGH (ref 70–99)
GLUCOSE BLDC GLUCOMTR-MCNC: 233 MG/DL — HIGH (ref 70–99)
GLUCOSE BLDC GLUCOMTR-MCNC: 271 MG/DL — HIGH (ref 70–99)
GLUCOSE BLDC GLUCOMTR-MCNC: 42 MG/DL — CRITICAL LOW (ref 70–99)
GLUCOSE BLDC GLUCOMTR-MCNC: 90 MG/DL — SIGNIFICANT CHANGE UP (ref 70–99)
GLUCOSE BLDC GLUCOMTR-MCNC: 98 MG/DL — SIGNIFICANT CHANGE UP (ref 70–99)
GLUCOSE SERPL-MCNC: 191 MG/DL — HIGH (ref 70–99)
HCT VFR BLD CALC: 43.4 % — SIGNIFICANT CHANGE UP (ref 42–52)
HGB BLD-MCNC: 14.2 G/DL — SIGNIFICANT CHANGE UP (ref 14–18)
IMM GRANULOCYTES NFR BLD AUTO: 0.4 % — HIGH (ref 0.1–0.3)
LYMPHOCYTES # BLD AUTO: 1.53 K/UL — SIGNIFICANT CHANGE UP (ref 1.2–3.4)
LYMPHOCYTES # BLD AUTO: 14.3 % — LOW (ref 20.5–51.1)
MAGNESIUM SERPL-MCNC: 2.1 MG/DL — SIGNIFICANT CHANGE UP (ref 1.8–2.4)
MCHC RBC-ENTMCNC: 30.2 PG — SIGNIFICANT CHANGE UP (ref 27–31)
MCHC RBC-ENTMCNC: 32.7 G/DL — SIGNIFICANT CHANGE UP (ref 32–37)
MCV RBC AUTO: 92.3 FL — SIGNIFICANT CHANGE UP (ref 80–94)
MONOCYTES # BLD AUTO: 0.63 K/UL — HIGH (ref 0.1–0.6)
MONOCYTES NFR BLD AUTO: 5.9 % — SIGNIFICANT CHANGE UP (ref 1.7–9.3)
NEUTROPHILS # BLD AUTO: 8.08 K/UL — HIGH (ref 1.4–6.5)
NEUTROPHILS NFR BLD AUTO: 75.3 % — HIGH (ref 42.2–75.2)
PHOSPHATE SERPL-MCNC: 3.2 MG/DL — SIGNIFICANT CHANGE UP (ref 2.1–4.9)
PLATELET # BLD AUTO: 221 K/UL — SIGNIFICANT CHANGE UP (ref 130–400)
POTASSIUM SERPL-MCNC: 3.5 MMOL/L — SIGNIFICANT CHANGE UP (ref 3.5–5)
POTASSIUM SERPL-SCNC: 3.5 MMOL/L — SIGNIFICANT CHANGE UP (ref 3.5–5)
PROT SERPL-MCNC: 5.9 G/DL — LOW (ref 6–8)
RBC # BLD: 4.7 M/UL — SIGNIFICANT CHANGE UP (ref 4.7–6.1)
RBC # FLD: 13.2 % — SIGNIFICANT CHANGE UP (ref 11.5–14.5)
SODIUM SERPL-SCNC: 146 MMOL/L — SIGNIFICANT CHANGE UP (ref 135–146)
WBC # BLD: 10.72 K/UL — SIGNIFICANT CHANGE UP (ref 4.8–10.8)
WBC # FLD AUTO: 10.72 K/UL — SIGNIFICANT CHANGE UP (ref 4.8–10.8)

## 2018-12-15 RX ORDER — CLOTRIMAZOLE AND BETAMETHASONE DIPROPIONATE 10; .5 MG/G; MG/G
1 CREAM TOPICAL
Qty: 0 | Refills: 0 | Status: DISCONTINUED | OUTPATIENT
Start: 2018-12-15 | End: 2018-12-18

## 2018-12-15 RX ORDER — METOPROLOL TARTRATE 50 MG
12.5 TABLET ORAL EVERY 6 HOURS
Qty: 0 | Refills: 0 | Status: DISCONTINUED | OUTPATIENT
Start: 2018-12-15 | End: 2018-12-16

## 2018-12-15 RX ADMIN — Medication 50 MILLIGRAM(S): at 05:13

## 2018-12-15 RX ADMIN — ENOXAPARIN SODIUM 100 MILLIGRAM(S): 100 INJECTION SUBCUTANEOUS at 18:12

## 2018-12-15 RX ADMIN — PROPOFOL 5.8 MICROGRAM(S)/KG/MIN: 10 INJECTION, EMULSION INTRAVENOUS at 08:36

## 2018-12-15 RX ADMIN — PROPOFOL 5.8 MICROGRAM(S)/KG/MIN: 10 INJECTION, EMULSION INTRAVENOUS at 22:12

## 2018-12-15 RX ADMIN — PANTOPRAZOLE SODIUM 40 MILLIGRAM(S): 20 TABLET, DELAYED RELEASE ORAL at 12:12

## 2018-12-15 RX ADMIN — MEROPENEM 100 MILLIGRAM(S): 1 INJECTION INTRAVENOUS at 13:53

## 2018-12-15 RX ADMIN — MEROPENEM 100 MILLIGRAM(S): 1 INJECTION INTRAVENOUS at 22:12

## 2018-12-15 RX ADMIN — AMIODARONE HYDROCHLORIDE 200 MILLIGRAM(S): 400 TABLET ORAL at 05:13

## 2018-12-15 RX ADMIN — Medication 12.5 MILLIGRAM(S): at 12:11

## 2018-12-15 RX ADMIN — ARIPIPRAZOLE 20 MILLIGRAM(S): 15 TABLET ORAL at 12:11

## 2018-12-15 RX ADMIN — INSULIN HUMAN 2 UNIT(S)/HR: 100 INJECTION, SOLUTION SUBCUTANEOUS at 08:37

## 2018-12-15 RX ADMIN — CHLORHEXIDINE GLUCONATE 15 MILLILITER(S): 213 SOLUTION TOPICAL at 18:11

## 2018-12-15 RX ADMIN — MEROPENEM 100 MILLIGRAM(S): 1 INJECTION INTRAVENOUS at 05:27

## 2018-12-15 RX ADMIN — ENOXAPARIN SODIUM 100 MILLIGRAM(S): 100 INJECTION SUBCUTANEOUS at 05:43

## 2018-12-15 RX ADMIN — CHLORHEXIDINE GLUCONATE 1 APPLICATION(S): 213 SOLUTION TOPICAL at 05:43

## 2018-12-15 RX ADMIN — CHLORHEXIDINE GLUCONATE 15 MILLILITER(S): 213 SOLUTION TOPICAL at 05:42

## 2018-12-15 NOTE — PROGRESS NOTE ADULT - SUBJECTIVE AND OBJECTIVE BOX
SUBJECTIVE:    Patient is a 58y old Male who presents with a chief complaint of Septic Shock (15 Dec 2018 07:30)    Currently admitted to medicine with the primary diagnosis of Acute respiratory failure with hypoxia and hypercapnia     Today is hospital day 7d. This morning he is resting comfortably in bed and reports no new issues or overnight events.     PAST MEDICAL & SURGICAL HISTORY  ETOH abuse  HLD (hyperlipidemia)  Hypertension, unspecified type  COPD (chronic obstructive pulmonary disease)  Schizophrenia  Psych & behavrl factors assoc w disord or dis classd elswhr  Diabetes  No significant past surgical history    SOCIAL HISTORY:  Negative for smoking/alcohol/drug use.     ALLERGIES:  No Known Allergies    MEDICATIONS:  STANDING MEDICATIONS  amiodarone    Tablet 200 milliGRAM(s) Oral daily  ARIPiprazole 20 milliGRAM(s) Oral daily  chlorhexidine 0.12% Liquid 15 milliLiter(s) Oral Mucosa two times a day  chlorhexidine 4% Liquid 1 Application(s) Topical <User Schedule>  dextrose 50% Injectable 50 milliLiter(s) IV Push every 15 minutes  dextrose 50% Injectable 25 milliLiter(s) IV Push every 15 minutes  enoxaparin Injectable 100 milliGRAM(s) SubCutaneous <User Schedule>  fentaNYL   Infusion 0.5 MICROgram(s)/kG/Hr IV Continuous <Continuous>  insulin Infusion 2 Unit(s)/Hr IV Continuous <Continuous>  meropenem  IVPB      meropenem  IVPB 1000 milliGRAM(s) IV Intermittent every 8 hours  midazolam Infusion 0.02 mG/kG/Hr IV Continuous <Continuous>  norepinephrine Infusion 0.05 MICROgram(s)/kG/Min IV Continuous <Continuous>  pantoprazole   Suspension 40 milliGRAM(s) Oral daily  predniSONE   Tablet 50 milliGRAM(s) Oral daily  propofol Infusion 10 MICROgram(s)/kG/Min IV Continuous <Continuous>    PRN MEDICATIONS  acetaminophen    Suspension .. 650 milliGRAM(s) Enteral Tube every 6 hours PRN  ALBUTerol   0.5% 2.5 milliGRAM(s) Nebulizer every 4 hours PRN    VITALS:   T(F): 99.9  HR: 69  BP: 148/78  RR: 23  SpO2: 97%    LABS:                        14.2   10.72 )-----------( 221      ( 15 Dec 2018 05:50 )             43.4     12-15    146  |  99  |  27<H>  ----------------------------<  191<H>  3.5   |  36<H>  |  0.5<L>    Ca    8.8      15 Dec 2018 05:50  Phos  3.2     12-15  Mg     2.1     12-15    TPro  5.9<L>  /  Alb  3.4<L>  /  TBili  0.5  /  DBili  x   /  AST  21  /  ALT  30  /  AlkPhos  82  12-15    PTT - ( 14 Dec 2018 06:20 )  PTT:31.0 sec    ABG - ( 14 Dec 2018 16:09 )  pH, Arterial: 7.47  pH, Blood: x     /  pCO2: 53    /  pO2: 89    / HCO3: 39    / Base Excess: 12.8  /  SaO2: 96                        RADIOLOGY:    PHYSICAL EXAM:  GEN: intubated  LUNGS: b/l vent sounds   HEART: S1/S2 present. RRR.   ABD: Soft, non-tender, non-distended. Bowel sounds present  EXT: no LE edema  NEURO: intubated

## 2018-12-15 NOTE — PROGRESS NOTE ADULT - ASSESSMENT
58/M hx of DM & HTN & schizophrenia?  presented to ED with SOB with orthopnea. Became hypotensive in ED after IV NTG was given for elevated BP. Required intubation and pressor support. Admit to CCU for Acute respiratory failure from Septic Shock requiring intubation and pressor support. CXR concerning for LLL infiltrate - PNA? vs ARDS?    Acute on chronic hypercapnic respiratory failure on MV  Septic shock secondary to aspiration PNA  Pulmonary edema secondary to CHF  HO Schizophrenia  Smoker    [  ] TLC  [X] Nichole  [X] intubated  [  ] Sedated  [   ] Pressor    Problem List  Acute on chronic hypercapnic respiratory failure on MV  Septic shock secondary to aspiration PNA - off pressors   Pulmonary edema secondary to HFrEF  HO Schizophrenia  Smoker    - RVP negative  - UA - trace leuks; neg nitrates; Ur Cx neg  - Sputum Cx neg  - Bld cx -neg (12/9)  - CE 0.04>0.05>0.02  - rEEG: Abnormal due to the presence of: generalized slowing as above  - MRSA negative  - All cx negative  - CTH neg  - EEG - gen. slowing    # Septic Shock due to aspiration PNA & Acute on chronic hypercapneic respiratory failure  - Intubated & Vented   - Off pressors  - C/w meropenem  - Prednisone PO daily (12/10 start)     # Pulmonary edema secondary to CHF   - TTE (12/10) - mod impaired function 30-35%, LV dilated, LV enlarged, mod MR, mild TR, LA dilation, small pericardial effusion, RV pressure 31  - Will need ACEI/ARB; hold for now   - Cardio following rec start B-Blocker    # New AF (CHADSVASC-3) now converted to sinus  - PO Amiodarone   - CTH negative  - LMWH for a/c    # Hypernatremia - start free water  - Free water deficit 3.2L; start 400cc free water q6Hr    # DM - insulin infusion  # Hx of HTN - hold BP meds  # Schizophrenia - on ariprazole  # DVT/GI ppx     Full Code 58/M hx of DM & HTN & schizophrenia?  presented to ED with SOB with orthopnea. Became hypotensive in ED after IV NTG was given for elevated BP. Required intubation and pressor support. Admit to CCU for Acute respiratory failure from Septic Shock requiring intubation and pressor support. CXR concerning for LLL infiltrate - PNA? vs ARDS?    Acute on chronic hypercapnic respiratory failure on MV  Septic shock secondary to aspiration PNA  Pulmonary edema secondary to CHF  HO Schizophrenia  Smoker    [  ] TLC  [X] Nichole  [X] intubated  [  ] Sedated  [   ] Pressor    Problem List  Acute on chronic hypercapnic respiratory failure on MV  Septic shock secondary to aspiration PNA - off pressors   Pulmonary edema secondary to HFrEF  HO Schizophrenia  Smoker    - RVP negative  - UA - trace leuks; neg nitrates; Ur Cx neg  - Sputum Cx neg  - Bld cx -neg (12/9)  - CE 0.04>0.05>0.02  - rEEG: Abnormal due to the presence of: generalized slowing as above  - MRSA negative  - All cx negative  - CTH neg  - EEG - gen. slowing    # Septic Shock due to aspiration PNA & Acute on chronic hypercapneic respiratory failure  - Intubated & Vented   - Off pressors  - C/w meropenem  - Prednisone PO daily (12/10 start)     # Pulmonary edema secondary to CHF   - TTE (12/10) - mod impaired function 30-35%, LV dilated, LV enlarged, mod MR, mild TR, LA dilation, small pericardial effusion, RV pressure 31  - Will need ACEI/ARB; hold for now   - Cardio following rec start B-Blocker    # New AF (CHADSVASC-3) now converted to sinus  - PO Amiodarone   - CTH negative  - LMWH for a/c    # Hypernatremia - improving; on free water  - Free water deficit 3.2L; start 400cc free water q6Hr x1 more day; evaluate for sunday 12/16  to lower free water regimen    # DM - insulin infusion  # Hx of HTN - hold BP meds  # Schizophrenia - on ariprazole  # DVT/GI ppx     Full Code 58/M hx of DM & HTN & schizophrenia?  presented to ED with SOB with orthopnea. Became hypotensive in ED after IV NTG was given for elevated BP. Required intubation and pressor support. Admit to CCU for Acute respiratory failure from Septic Shock requiring intubation and pressor support. CXR concerning for LLL infiltrate - PNA? vs ARDS?    Acute on chronic hypercapnic respiratory failure on MV  Septic shock secondary to aspiration PNA  Pulmonary edema secondary to CHF  HO Schizophrenia  Smoker    [  ] TLC  [X] Nichole  [X] intubated  [  ] Sedated  [   ] Pressor    Problem List  Acute on chronic hypercapnic respiratory failure on MV  Septic shock secondary to aspiration PNA - off pressors   Pulmonary edema secondary to HFrEF  HO Schizophrenia  Smoker    - RVP negative  - UA - trace leuks; neg nitrates; Ur Cx neg  - Sputum Cx neg  - Bld cx -neg (12/9)  - CE 0.04>0.05>0.02  - rEEG: Abnormal due to the presence of: generalized slowing as above  - MRSA negative  - All cx negative  - CTH neg  - EEG - gen. slowing    # Septic Shock due to aspiration PNA & Acute on chronic hypercapneic respiratory failure  - Intubated & Vented   - Off pressors  - C/w meropenem  - Prednisone PO daily (12/10 start)     # Pulmonary edema secondary to CHF   - TTE (12/10) - mod impaired function 30-35%, LV dilated, LV enlarged, mod MR, mild TR, LA dilation, small pericardial effusion, RV pressure 31  - Will need ACEI/ARB; hold for now   - Cardio following rec start B-Blocker; will start Metoprolol 12.5mg q6 with parameters for now    # New AF (CHADSVASC-3) now converted to sinus  - PO Amiodarone  - CTH negative  - LMWH for a/c    # Hypernatremia - improving  - Free water deficit 3.2L; start 400cc free water q6Hr x1 more day; evaluate for sunday 12/16  to lower free water regimen    # DM - insulin infusion  # Hx of HTN - hold BP meds  # Schizophrenia - on ariprazole  # DVT/GI ppx     Full Code

## 2018-12-15 NOTE — PROGRESS NOTE ADULT - SUBJECTIVE AND OBJECTIVE BOX
Patient is a 58y old  Male who presents with a chief complaint of Septic Shock (14 Dec 2018 11:01)      T(F): 99 (12-15-18 @ 02:56), Max: 99.2 (12-14-18 @ 21:00)  HR: 69 (12-15-18 @ 05:00)  BP: 148/78 (12-15-18 @ 05:00)  RR: 26 (12-15-18 @ 05:00)  SpO2: 97% (12-15-18 @ 05:00) (95% - 99%)    PHYSICAL EXAM:  GENERAL: NAD, well-groomed, well-developed  HEAD:  Atraumatic, Normocephalic  EYES: EOMI, PERRLA, conjunctiva and sclera clear  ENMT: No tonsillar erythema, exudates, or enlargement; Moist mucous membranes, Good dentition, No lesions  NECK: Supple, No JVD, Normal thyroid  NERVOUS SYSTEM:  Alert & Oriented X3,  Motor Strength 5/5 B/L upper and lower extremities  CHEST/LUNG: Clear to percussion bilaterally; No rales, rhonchi, wheezing, or rubs  HEART: Regular rate and rhythm; No murmurs, rubs, or gallops  ABDOMEN: Soft, Nontender, Nondistended; Bowel sounds present  EXTREMITIES:   No clubbing, cyanosis, or edema  LYMPH: No lymphadenopathy noted  SKIN: No rashes or lesions    labs  12-14    147<H>  |  102  |  34<H>  ----------------------------<  194<H>  3.9   |  38<H>  |  0.5<L>    Ca    8.7      14 Dec 2018 08:00  Phos  3.0     12-14  Mg     2.3     12-14    TPro  3.4<L>  /  Alb  1.8<L>  /  TBili  0.3  /  DBili  x   /  AST  11  /  ALT  19  /  AlkPhos  45  12-14                          13.3   11.38 )-----------( 213      ( 14 Dec 2018 08:00 )             40.8       PTT - ( 14 Dec 2018 06:20 )  PTT:31.0 sec  Mode: Auto Mode: PRVC/ Volume Support  RR (machine): 22  TV (machine): 450  FiO2: 50  PEEP: 5  MAP: 11  PIP: 26        acetaminophen    Suspension .. 650 milliGRAM(s) Enteral Tube every 6 hours PRN  ALBUTerol   0.5% 2.5 milliGRAM(s) Nebulizer every 4 hours PRN  amiodarone    Tablet 200 milliGRAM(s) Oral daily  ARIPiprazole 20 milliGRAM(s) Oral daily  chlorhexidine 0.12% Liquid 15 milliLiter(s) Oral Mucosa two times a day  chlorhexidine 4% Liquid 1 Application(s) Topical <User Schedule>  dextrose 50% Injectable 50 milliLiter(s) IV Push every 15 minutes  dextrose 50% Injectable 25 milliLiter(s) IV Push every 15 minutes  enoxaparin Injectable 100 milliGRAM(s) SubCutaneous <User Schedule>  fentaNYL   Infusion 0.5 MICROgram(s)/kG/Hr IV Continuous <Continuous>  insulin Infusion 2 Unit(s)/Hr IV Continuous <Continuous>  meropenem  IVPB      meropenem  IVPB 1000 milliGRAM(s) IV Intermittent every 8 hours  midazolam Infusion 0.02 mG/kG/Hr IV Continuous <Continuous>  norepinephrine Infusion 0.05 MICROgram(s)/kG/Min IV Continuous <Continuous>  pantoprazole   Suspension 40 milliGRAM(s) Oral daily  predniSONE   Tablet 50 milliGRAM(s) Oral daily  propofol Infusion 10 MICROgram(s)/kG/Min IV Continuous <Continuous>

## 2018-12-15 NOTE — PROGRESS NOTE ADULT - ASSESSMENT
Patient mildly sedated on vent. Echo EF 30 -35 %. Will need beta ace or arb when stable. Now in NSR. PO amiodarone . Lovenox for now. When off sedation not appropriate. . ? try hold sedation again today. No further v-tach. Check cxr. Continue try wean vent. Begin low dose beta

## 2018-12-16 LAB
ALBUMIN SERPL ELPH-MCNC: 3.2 G/DL — LOW (ref 3.5–5.2)
ALP SERPL-CCNC: 80 U/L — SIGNIFICANT CHANGE UP (ref 30–115)
ALT FLD-CCNC: 31 U/L — SIGNIFICANT CHANGE UP (ref 0–41)
ANION GAP SERPL CALC-SCNC: 10 MMOL/L — SIGNIFICANT CHANGE UP (ref 7–14)
AST SERPL-CCNC: 21 U/L — SIGNIFICANT CHANGE UP (ref 0–41)
BASE EXCESS BLDA CALC-SCNC: 12.1 MMOL/L — HIGH (ref -2–2)
BILIRUB SERPL-MCNC: 0.5 MG/DL — SIGNIFICANT CHANGE UP (ref 0.2–1.2)
BUN SERPL-MCNC: 29 MG/DL — HIGH (ref 10–20)
CALCIUM SERPL-MCNC: 9.1 MG/DL — SIGNIFICANT CHANGE UP (ref 8.5–10.1)
CHLORIDE SERPL-SCNC: 98 MMOL/L — SIGNIFICANT CHANGE UP (ref 98–110)
CO2 SERPL-SCNC: 37 MMOL/L — HIGH (ref 17–32)
CREAT SERPL-MCNC: 0.5 MG/DL — LOW (ref 0.7–1.5)
GLUCOSE BLDC GLUCOMTR-MCNC: 102 MG/DL — HIGH (ref 70–99)
GLUCOSE BLDC GLUCOMTR-MCNC: 123 MG/DL — HIGH (ref 70–99)
GLUCOSE BLDC GLUCOMTR-MCNC: 137 MG/DL — HIGH (ref 70–99)
GLUCOSE BLDC GLUCOMTR-MCNC: 139 MG/DL — HIGH (ref 70–99)
GLUCOSE BLDC GLUCOMTR-MCNC: 150 MG/DL — HIGH (ref 70–99)
GLUCOSE BLDC GLUCOMTR-MCNC: 152 MG/DL — HIGH (ref 70–99)
GLUCOSE BLDC GLUCOMTR-MCNC: 176 MG/DL — HIGH (ref 70–99)
GLUCOSE BLDC GLUCOMTR-MCNC: 187 MG/DL — HIGH (ref 70–99)
GLUCOSE BLDC GLUCOMTR-MCNC: 200 MG/DL — HIGH (ref 70–99)
GLUCOSE BLDC GLUCOMTR-MCNC: 235 MG/DL — HIGH (ref 70–99)
GLUCOSE BLDC GLUCOMTR-MCNC: 266 MG/DL — HIGH (ref 70–99)
GLUCOSE SERPL-MCNC: 170 MG/DL — HIGH (ref 70–99)
HCO3 BLDA-SCNC: 39 MMOL/L — HIGH (ref 23–27)
HCT VFR BLD CALC: 42.5 % — SIGNIFICANT CHANGE UP (ref 42–52)
HGB BLD-MCNC: 14.1 G/DL — SIGNIFICANT CHANGE UP (ref 14–18)
HOROWITZ INDEX BLDA+IHG-RTO: 50 — SIGNIFICANT CHANGE UP
MAGNESIUM SERPL-MCNC: 2.2 MG/DL — SIGNIFICANT CHANGE UP (ref 1.8–2.4)
MCHC RBC-ENTMCNC: 30.5 PG — SIGNIFICANT CHANGE UP (ref 27–31)
MCHC RBC-ENTMCNC: 33.2 G/DL — SIGNIFICANT CHANGE UP (ref 32–37)
MCV RBC AUTO: 92 FL — SIGNIFICANT CHANGE UP (ref 80–94)
NRBC # BLD: 0 /100 WBCS — SIGNIFICANT CHANGE UP (ref 0–0)
PCO2 BLDA: 56 MMHG — HIGH (ref 38–42)
PH BLDA: 7.45 — HIGH (ref 7.38–7.42)
PHOSPHATE SERPL-MCNC: 2.9 MG/DL — SIGNIFICANT CHANGE UP (ref 2.1–4.9)
PLATELET # BLD AUTO: 214 K/UL — SIGNIFICANT CHANGE UP (ref 130–400)
PO2 BLDA: 89 MMHG — SIGNIFICANT CHANGE UP (ref 78–95)
POTASSIUM SERPL-MCNC: 3.5 MMOL/L — SIGNIFICANT CHANGE UP (ref 3.5–5)
POTASSIUM SERPL-SCNC: 3.5 MMOL/L — SIGNIFICANT CHANGE UP (ref 3.5–5)
PROT SERPL-MCNC: 6 G/DL — SIGNIFICANT CHANGE UP (ref 6–8)
RBC # BLD: 4.62 M/UL — LOW (ref 4.7–6.1)
RBC # FLD: 13.2 % — SIGNIFICANT CHANGE UP (ref 11.5–14.5)
SAO2 % BLDA: 96 % — SIGNIFICANT CHANGE UP (ref 94–98)
SODIUM SERPL-SCNC: 145 MMOL/L — SIGNIFICANT CHANGE UP (ref 135–146)
WBC # BLD: 10.03 K/UL — SIGNIFICANT CHANGE UP (ref 4.8–10.8)
WBC # FLD AUTO: 10.03 K/UL — SIGNIFICANT CHANGE UP (ref 4.8–10.8)

## 2018-12-16 RX ORDER — FUROSEMIDE 40 MG
40 TABLET ORAL ONCE
Qty: 0 | Refills: 0 | Status: COMPLETED | OUTPATIENT
Start: 2018-12-16 | End: 2018-12-16

## 2018-12-16 RX ORDER — DOCUSATE SODIUM 100 MG
200 CAPSULE ORAL
Qty: 0 | Refills: 0 | Status: DISCONTINUED | OUTPATIENT
Start: 2018-12-16 | End: 2018-12-20

## 2018-12-16 RX ORDER — METOPROLOL TARTRATE 50 MG
12.5 TABLET ORAL
Qty: 0 | Refills: 0 | Status: DISCONTINUED | OUTPATIENT
Start: 2018-12-16 | End: 2018-12-22

## 2018-12-16 RX ORDER — LACTULOSE 10 G/15ML
20 SOLUTION ORAL DAILY
Qty: 0 | Refills: 0 | Status: DISCONTINUED | OUTPATIENT
Start: 2018-12-16 | End: 2018-12-17

## 2018-12-16 RX ADMIN — MEROPENEM 100 MILLIGRAM(S): 1 INJECTION INTRAVENOUS at 14:05

## 2018-12-16 RX ADMIN — MEROPENEM 100 MILLIGRAM(S): 1 INJECTION INTRAVENOUS at 06:11

## 2018-12-16 RX ADMIN — PROPOFOL 5.8 MICROGRAM(S)/KG/MIN: 10 INJECTION, EMULSION INTRAVENOUS at 09:35

## 2018-12-16 RX ADMIN — CHLORHEXIDINE GLUCONATE 15 MILLILITER(S): 213 SOLUTION TOPICAL at 06:10

## 2018-12-16 RX ADMIN — INSULIN HUMAN 2 UNIT(S)/HR: 100 INJECTION, SOLUTION SUBCUTANEOUS at 09:33

## 2018-12-16 RX ADMIN — MEROPENEM 100 MILLIGRAM(S): 1 INJECTION INTRAVENOUS at 21:13

## 2018-12-16 RX ADMIN — ENOXAPARIN SODIUM 100 MILLIGRAM(S): 100 INJECTION SUBCUTANEOUS at 18:12

## 2018-12-16 RX ADMIN — PROPOFOL 5.8 MICROGRAM(S)/KG/MIN: 10 INJECTION, EMULSION INTRAVENOUS at 20:13

## 2018-12-16 RX ADMIN — Medication 40 MILLIGRAM(S): at 08:59

## 2018-12-16 RX ADMIN — PROPOFOL 5.8 MICROGRAM(S)/KG/MIN: 10 INJECTION, EMULSION INTRAVENOUS at 02:44

## 2018-12-16 RX ADMIN — ENOXAPARIN SODIUM 100 MILLIGRAM(S): 100 INJECTION SUBCUTANEOUS at 06:11

## 2018-12-16 RX ADMIN — Medication 12.5 MILLIGRAM(S): at 18:12

## 2018-12-16 RX ADMIN — CHLORHEXIDINE GLUCONATE 1 APPLICATION(S): 213 SOLUTION TOPICAL at 06:11

## 2018-12-16 RX ADMIN — PANTOPRAZOLE SODIUM 40 MILLIGRAM(S): 20 TABLET, DELAYED RELEASE ORAL at 11:47

## 2018-12-16 RX ADMIN — AMIODARONE HYDROCHLORIDE 200 MILLIGRAM(S): 400 TABLET ORAL at 06:10

## 2018-12-16 RX ADMIN — ARIPIPRAZOLE 20 MILLIGRAM(S): 15 TABLET ORAL at 11:47

## 2018-12-16 RX ADMIN — Medication 50 MILLIGRAM(S): at 06:10

## 2018-12-16 RX ADMIN — PROPOFOL 5.8 MICROGRAM(S)/KG/MIN: 10 INJECTION, EMULSION INTRAVENOUS at 04:54

## 2018-12-16 RX ADMIN — CHLORHEXIDINE GLUCONATE 15 MILLILITER(S): 213 SOLUTION TOPICAL at 18:12

## 2018-12-16 NOTE — PROGRESS NOTE ADULT - SUBJECTIVE AND OBJECTIVE BOX
Patient is a 58y old  Male who presents with a chief complaint of Septic Shock (15 Dec 2018 07:30)      T(F): 98.4 (12-16-18 @ 03:07), Max: 100 (12-15-18 @ 11:00)  HR: 47 (12-16-18 @ 03:07)  BP: 126/60 (12-16-18 @ 03:00)  RR: 23 (12-16-18 @ 05:07)  SpO2: 97% (12-16-18 @ 05:07) (92% - 99%)    PHYSICAL EXAM:  GENERAL: NAD, well-groomed, well-developed  HEAD:  Atraumatic, Normocephalic  EYES: EOMI, PERRLA, conjunctiva and sclera clear  ENMT: No tonsillar erythema, exudates, or enlargement; Moist mucous membranes, Good dentition, No lesions  NECK: Supple, No JVD, Normal thyroid  NERVOUS SYSTEM:  Alert & Oriented X3,  Motor Strength 5/5 B/L upper and lower extremities  CHEST/LUNG: Clear to percussion bilaterally; No rales, rhonchi, wheezing, or rubs  HEART: Regular rate and rhythm; No murmurs, rubs, or gallops  ABDOMEN: Soft, Nontender, Nondistended; Bowel sounds present  EXTREMITIES:   No clubbing, cyanosis, or edema  LYMPH: No lymphadenopathy noted  SKIN: No rashes or lesions    labs  12-16    145  |  98  |  29<H>  ----------------------------<  170<H>  3.5   |  37<H>  |  0.5<L>    Ca    9.1      16 Dec 2018 06:03  Phos  2.9     12-16  Mg     2.2     12-16    TPro  6.0  /  Alb  3.2<L>  /  TBili  0.5  /  DBili  x   /  AST  21  /  ALT  31  /  AlkPhos  80  12-16                          14.1   10.03 )-----------( 214      ( 16 Dec 2018 06:03 )             42.5         Mode: Auto Mode: PRVC/ Volume Support  RR (machine): 22  TV (machine): 450  FiO2: 50  PEEP: 5  MAP: 10  PIP: 20        acetaminophen    Suspension .. 650 milliGRAM(s) Enteral Tube every 6 hours PRN  ALBUTerol   0.5% 2.5 milliGRAM(s) Nebulizer every 4 hours PRN  amiodarone    Tablet 200 milliGRAM(s) Oral daily  ARIPiprazole 20 milliGRAM(s) Oral daily  chlorhexidine 0.12% Liquid 15 milliLiter(s) Oral Mucosa two times a day  chlorhexidine 4% Liquid 1 Application(s) Topical <User Schedule>  clotrimazole/betamethasone Cream 1 Application(s) Topical two times a day  dextrose 50% Injectable 50 milliLiter(s) IV Push every 15 minutes  dextrose 50% Injectable 25 milliLiter(s) IV Push every 15 minutes  enoxaparin Injectable 100 milliGRAM(s) SubCutaneous <User Schedule>  insulin Infusion 2 Unit(s)/Hr IV Continuous <Continuous>  meropenem  IVPB      meropenem  IVPB 1000 milliGRAM(s) IV Intermittent every 8 hours  metoprolol tartrate 12.5 milliGRAM(s) Oral every 6 hours  norepinephrine Infusion 0.05 MICROgram(s)/kG/Min IV Continuous <Continuous>  pantoprazole   Suspension 40 milliGRAM(s) Oral daily  predniSONE   Tablet 50 milliGRAM(s) Oral daily  propofol Infusion 10 MICROgram(s)/kG/Min IV Continuous <Continuous>

## 2018-12-16 NOTE — PROGRESS NOTE ADULT - ASSESSMENT
Patient  sedated on vent. Echo EF 30 -35 %. On beta hr decreased. Will lower dose. Possible ace or arb when stable.  Now in NSR. PO amiodarone . Lovenox for now. When off sedation not appropriate.No further v-tach. Check cxr. Prognosis guarded

## 2018-12-16 NOTE — PROGRESS NOTE ADULT - SUBJECTIVE AND OBJECTIVE BOX
ISRAEL MO  58y  Male      Patient is a 58y old  Male who presents with a chief complaint of Septic Shock (16 Dec 2018 07:17)        IACUTE RESPIRATORY FAILURE WITH HYPOXIA AND HYPERCAPNIA ACUTE CONGESTIVE  ^ DIFFCULTY BREATHIGNM  No pertinent family history in first degree relatives  Handoff  MEWS Score  ETOH abuse  HLD (hyperlipidemia)  Hypertension, unspecified type  COPD (chronic obstructive pulmonary disease)  Schizophrenia  Psych & behavrl factors assoc w disord or dis classd elswhr  Diabetes  Acute respiratory failure with hypoxia and hypercapnia  No significant past surgical history  DIFFCULTY BREATHIGNM  90+  Acute congestive heart failure, unspecified heart failure type  NTERVAL HPI/OVERNIGHT EVENTS:        REVIEW OF SYSTEMS:  CONSTITUTIONAL: No fever, weight loss, or fatigue  EYES: No eye pain, visual disturbances, or discharge  ENMT:  No difficulty hearing, tinnitus, vertigo; No sinus or throat pain  NECK: No pain or stiffness  BREASTS: No pain, masses, or nipple discharge  RESPIRATORY: No cough, wheezing, chills or hemoptysis; No shortness of breath  CARDIOVASCULAR: No chest pain, palpitations, dizziness, or leg swelling  GASTROINTESTINAL: No abdominal or epigastric pain. No nausea, vomiting, or hematemesis; No diarrhea or constipation. No melena or hematochezia.  GENITOURINARY: No dysuria, frequency, hematuria, or incontinence  NEUROLOGICAL: No headaches, memory loss, loss of strength, numbness, or tremors  SKIN: No itching, burning, rashes, or lesions   LYMPH NODES: No enlarged glands  ENDOCRINE: No heat or cold intolerance; No hair loss  MUSCULOSKELETAL: No joint pain or swelling; No muscle, back, or extremity pain  PSYCHIATRIC: No depression, anxiety, mood swings, or difficulty sleeping  HEME/LYMPH: No easy bruising, or bleeding gums  ALLERY AND IMMUNOLOGIC: No hives or eczema  FAMILY HISTORY:  No pertinent family history in first degree relatives    T(C): 38 (12-16-18 @ 11:00), Max: 38 (12-16-18 @ 11:00)  HR: 56 (12-16-18 @ 08:38) (45 - 63)  BP: 125/78 (12-16-18 @ 05:00) (103/56 - 144/65)  RR: 22 (12-16-18 @ 11:00) (18 - 34)  SpO2: 98% (12-16-18 @ 08:38) (96% - 99%)  Wt(kg): --Vital Signs Last 24 Hrs  T(C): 38 (16 Dec 2018 11:00), Max: 38 (16 Dec 2018 11:00)  T(F): 100.4 (16 Dec 2018 11:00), Max: 100.4 (16 Dec 2018 11:00)  HR: 56 (16 Dec 2018 08:38) (45 - 63)  BP: 125/78 (16 Dec 2018 05:00) (103/56 - 144/65)  BP(mean): 94 (16 Dec 2018 05:00) (76 - 95)  RR: 22 (16 Dec 2018 11:00) (18 - 34)  SpO2: 98% (16 Dec 2018 08:38) (96% - 99%)    PHYSICAL EXAM:  GENERAL: NAD, well-groomed, well-developed  intubated and vent  EYES: EOMI, PERRLA, conjunctiva and sclera clear  ENMT: No tonsillar erythema, exudates, or enlargement; Moist mucous membranes, Good dentition, No lesions  NECK: Supple, No JVD, Normal thyroid  NERVOUS SYSTEM:  Alert & Oriented X3, Good concentration; Motor Strength 5/5 B/L upper and lower extremities; DTRs 2+ intact and symmetric  CHEST/LUNG: Clear to percussion bilaterally; No rales, rhonchi, wheezing, or rubs  HEART: Regular rate and rhythm; No murmurs, rubs, or gallops  ABDOMEN: Soft, Nontender, Nondistended; Bowel sounds present  EXTREMITIES:  2+ Peripheral Pulses, No clubbing, cyanosis, or edema  LYMPH: No lymphadenopathy noted  SKIN: No rashes or lesions    Consultant(s) Notes Reviewed:  [x ] YES  [ ] NO  Care Discussed with Consultants/Other Providers [ x] YES  [ ] NO          Imaging Personally Reviewed:  [ ] YES  [ ] NO    HEALTH ISSUES - PROBLEM Dx:    low ef

## 2018-12-17 LAB
ALBUMIN SERPL ELPH-MCNC: 3.1 G/DL — LOW (ref 3.5–5.2)
ALP SERPL-CCNC: 81 U/L — SIGNIFICANT CHANGE UP (ref 30–115)
ALT FLD-CCNC: 33 U/L — SIGNIFICANT CHANGE UP (ref 0–41)
ANION GAP SERPL CALC-SCNC: 12 MMOL/L — SIGNIFICANT CHANGE UP (ref 7–14)
ANION GAP SERPL CALC-SCNC: 8 MMOL/L — SIGNIFICANT CHANGE UP (ref 7–14)
AST SERPL-CCNC: 24 U/L — SIGNIFICANT CHANGE UP (ref 0–41)
BASE EXCESS BLDA CALC-SCNC: 16.7 MMOL/L — HIGH (ref -2–2)
BILIRUB SERPL-MCNC: 0.5 MG/DL — SIGNIFICANT CHANGE UP (ref 0.2–1.2)
BUN SERPL-MCNC: 28 MG/DL — HIGH (ref 10–20)
BUN SERPL-MCNC: 30 MG/DL — HIGH (ref 10–20)
CALCIUM SERPL-MCNC: 8.8 MG/DL — SIGNIFICANT CHANGE UP (ref 8.5–10.1)
CALCIUM SERPL-MCNC: 9 MG/DL — SIGNIFICANT CHANGE UP (ref 8.5–10.1)
CHLORIDE SERPL-SCNC: 102 MMOL/L — SIGNIFICANT CHANGE UP (ref 98–110)
CHLORIDE SERPL-SCNC: 96 MMOL/L — LOW (ref 98–110)
CHLORIDE UR-SCNC: 20 — SIGNIFICANT CHANGE UP
CO2 SERPL-SCNC: 37 MMOL/L — HIGH (ref 17–32)
CO2 SERPL-SCNC: 40 MMOL/L — HIGH (ref 17–32)
CREAT SERPL-MCNC: 0.6 MG/DL — LOW (ref 0.7–1.5)
CREAT SERPL-MCNC: 0.7 MG/DL — SIGNIFICANT CHANGE UP (ref 0.7–1.5)
GLUCOSE BLDC GLUCOMTR-MCNC: 145 MG/DL — HIGH (ref 70–99)
GLUCOSE BLDC GLUCOMTR-MCNC: 152 MG/DL — HIGH (ref 70–99)
GLUCOSE BLDC GLUCOMTR-MCNC: 167 MG/DL — HIGH (ref 70–99)
GLUCOSE BLDC GLUCOMTR-MCNC: 173 MG/DL — HIGH (ref 70–99)
GLUCOSE BLDC GLUCOMTR-MCNC: 185 MG/DL — HIGH (ref 70–99)
GLUCOSE BLDC GLUCOMTR-MCNC: 194 MG/DL — HIGH (ref 70–99)
GLUCOSE BLDC GLUCOMTR-MCNC: 254 MG/DL — HIGH (ref 70–99)
GLUCOSE BLDC GLUCOMTR-MCNC: 285 MG/DL — HIGH (ref 70–99)
GLUCOSE BLDC GLUCOMTR-MCNC: 290 MG/DL — HIGH (ref 70–99)
GLUCOSE SERPL-MCNC: 165 MG/DL — HIGH (ref 70–99)
GLUCOSE SERPL-MCNC: 206 MG/DL — HIGH (ref 70–99)
HCO3 BLDA-SCNC: 43 MMOL/L — CRITICAL HIGH (ref 23–27)
HCT VFR BLD CALC: 41.6 % — LOW (ref 42–52)
HGB BLD-MCNC: 13.7 G/DL — LOW (ref 14–18)
HOROWITZ INDEX BLDA+IHG-RTO: 45 — SIGNIFICANT CHANGE UP
MAGNESIUM SERPL-MCNC: 2.3 MG/DL — SIGNIFICANT CHANGE UP (ref 1.8–2.4)
MCHC RBC-ENTMCNC: 30.2 PG — SIGNIFICANT CHANGE UP (ref 27–31)
MCHC RBC-ENTMCNC: 32.9 G/DL — SIGNIFICANT CHANGE UP (ref 32–37)
MCV RBC AUTO: 91.6 FL — SIGNIFICANT CHANGE UP (ref 80–94)
NRBC # BLD: 0 /100 WBCS — SIGNIFICANT CHANGE UP (ref 0–0)
PCO2 BLDA: 53 MMHG — HIGH (ref 38–42)
PH BLDA: 7.52 — HIGH (ref 7.38–7.42)
PHOSPHATE SERPL-MCNC: 2.4 MG/DL — SIGNIFICANT CHANGE UP (ref 2.1–4.9)
PLATELET # BLD AUTO: 236 K/UL — SIGNIFICANT CHANGE UP (ref 130–400)
PO2 BLDA: 69 MMHG — LOW (ref 78–95)
POTASSIUM SERPL-MCNC: 3.2 MMOL/L — LOW (ref 3.5–5)
POTASSIUM SERPL-MCNC: 3.5 MMOL/L — SIGNIFICANT CHANGE UP (ref 3.5–5)
POTASSIUM SERPL-SCNC: 3.2 MMOL/L — LOW (ref 3.5–5)
POTASSIUM SERPL-SCNC: 3.5 MMOL/L — SIGNIFICANT CHANGE UP (ref 3.5–5)
POTASSIUM UR-SCNC: <3 MMOL/L — SIGNIFICANT CHANGE UP
PROT SERPL-MCNC: 5.6 G/DL — LOW (ref 6–8)
RBC # BLD: 4.54 M/UL — LOW (ref 4.7–6.1)
RBC # FLD: 13.3 % — SIGNIFICANT CHANGE UP (ref 11.5–14.5)
SAO2 % BLDA: 94 % — SIGNIFICANT CHANGE UP (ref 94–98)
SODIUM SERPL-SCNC: 145 MMOL/L — SIGNIFICANT CHANGE UP (ref 135–146)
SODIUM SERPL-SCNC: 150 MMOL/L — HIGH (ref 135–146)
SODIUM UR-SCNC: <20 MMOL/L — SIGNIFICANT CHANGE UP
WBC # BLD: 9.3 K/UL — SIGNIFICANT CHANGE UP (ref 4.8–10.8)
WBC # FLD AUTO: 9.3 K/UL — SIGNIFICANT CHANGE UP (ref 4.8–10.8)

## 2018-12-17 RX ORDER — FUROSEMIDE 40 MG
40 TABLET ORAL
Qty: 0 | Refills: 0 | Status: COMPLETED | OUTPATIENT
Start: 2018-12-17 | End: 2018-12-17

## 2018-12-17 RX ORDER — LACTULOSE 10 G/15ML
20 SOLUTION ORAL EVERY 12 HOURS
Qty: 0 | Refills: 0 | Status: DISCONTINUED | OUTPATIENT
Start: 2018-12-17 | End: 2018-12-17

## 2018-12-17 RX ORDER — POTASSIUM CHLORIDE 20 MEQ
40 PACKET (EA) ORAL EVERY 4 HOURS
Qty: 0 | Refills: 0 | Status: DISCONTINUED | OUTPATIENT
Start: 2018-12-17 | End: 2018-12-17

## 2018-12-17 RX ORDER — LACTULOSE 10 G/15ML
20 SOLUTION ORAL EVERY 8 HOURS
Qty: 0 | Refills: 0 | Status: DISCONTINUED | OUTPATIENT
Start: 2018-12-17 | End: 2018-12-20

## 2018-12-17 RX ORDER — POTASSIUM CHLORIDE 20 MEQ
40 PACKET (EA) ORAL EVERY 4 HOURS
Qty: 0 | Refills: 0 | Status: COMPLETED | OUTPATIENT
Start: 2018-12-17 | End: 2018-12-17

## 2018-12-17 RX ORDER — POTASSIUM CHLORIDE 20 MEQ
40 PACKET (EA) ORAL ONCE
Qty: 0 | Refills: 0 | Status: COMPLETED | OUTPATIENT
Start: 2018-12-17 | End: 2018-12-17

## 2018-12-17 RX ADMIN — Medication 30 MILLIGRAM(S): at 11:27

## 2018-12-17 RX ADMIN — Medication 12.5 MILLIGRAM(S): at 05:09

## 2018-12-17 RX ADMIN — PROPOFOL 5.8 MICROGRAM(S)/KG/MIN: 10 INJECTION, EMULSION INTRAVENOUS at 04:03

## 2018-12-17 RX ADMIN — PROPOFOL 5.8 MICROGRAM(S)/KG/MIN: 10 INJECTION, EMULSION INTRAVENOUS at 17:05

## 2018-12-17 RX ADMIN — Medication 40 MILLIGRAM(S): at 17:10

## 2018-12-17 RX ADMIN — LACTULOSE 20 GRAM(S): 10 SOLUTION ORAL at 21:57

## 2018-12-17 RX ADMIN — ENOXAPARIN SODIUM 100 MILLIGRAM(S): 100 INJECTION SUBCUTANEOUS at 17:11

## 2018-12-17 RX ADMIN — AMIODARONE HYDROCHLORIDE 200 MILLIGRAM(S): 400 TABLET ORAL at 05:09

## 2018-12-17 RX ADMIN — Medication 40 MILLIEQUIVALENT(S): at 14:16

## 2018-12-17 RX ADMIN — Medication 40 MILLIEQUIVALENT(S): at 10:21

## 2018-12-17 RX ADMIN — CHLORHEXIDINE GLUCONATE 1 APPLICATION(S): 213 SOLUTION TOPICAL at 06:24

## 2018-12-17 RX ADMIN — Medication 50 MILLIGRAM(S): at 05:09

## 2018-12-17 RX ADMIN — CHLORHEXIDINE GLUCONATE 15 MILLILITER(S): 213 SOLUTION TOPICAL at 05:09

## 2018-12-17 RX ADMIN — PROPOFOL 5.8 MICROGRAM(S)/KG/MIN: 10 INJECTION, EMULSION INTRAVENOUS at 07:45

## 2018-12-17 RX ADMIN — Medication 40 MILLIGRAM(S): at 09:30

## 2018-12-17 RX ADMIN — CHLORHEXIDINE GLUCONATE 15 MILLILITER(S): 213 SOLUTION TOPICAL at 17:09

## 2018-12-17 RX ADMIN — MEROPENEM 100 MILLIGRAM(S): 1 INJECTION INTRAVENOUS at 14:16

## 2018-12-17 RX ADMIN — PANTOPRAZOLE SODIUM 40 MILLIGRAM(S): 20 TABLET, DELAYED RELEASE ORAL at 11:27

## 2018-12-17 RX ADMIN — Medication 12.5 MILLIGRAM(S): at 17:11

## 2018-12-17 RX ADMIN — Medication 200 MILLIGRAM(S): at 05:08

## 2018-12-17 RX ADMIN — ENOXAPARIN SODIUM 100 MILLIGRAM(S): 100 INJECTION SUBCUTANEOUS at 05:08

## 2018-12-17 RX ADMIN — Medication 200 MILLIGRAM(S): at 17:10

## 2018-12-17 RX ADMIN — LACTULOSE 20 GRAM(S): 10 SOLUTION ORAL at 11:26

## 2018-12-17 RX ADMIN — ARIPIPRAZOLE 20 MILLIGRAM(S): 15 TABLET ORAL at 11:26

## 2018-12-17 RX ADMIN — MEROPENEM 100 MILLIGRAM(S): 1 INJECTION INTRAVENOUS at 21:56

## 2018-12-17 RX ADMIN — PROPOFOL 5.8 MICROGRAM(S)/KG/MIN: 10 INJECTION, EMULSION INTRAVENOUS at 00:23

## 2018-12-17 RX ADMIN — INSULIN HUMAN 2 UNIT(S)/HR: 100 INJECTION, SOLUTION SUBCUTANEOUS at 10:29

## 2018-12-17 RX ADMIN — LACTULOSE 20 GRAM(S): 10 SOLUTION ORAL at 14:16

## 2018-12-17 RX ADMIN — INSULIN HUMAN 2 UNIT(S)/HR: 100 INJECTION, SOLUTION SUBCUTANEOUS at 21:56

## 2018-12-17 RX ADMIN — MEROPENEM 100 MILLIGRAM(S): 1 INJECTION INTRAVENOUS at 05:09

## 2018-12-17 NOTE — PROGRESS NOTE ADULT - ASSESSMENT
58/M hx of DM & HTN & schizophrenia?  presented to ED with SOB with orthopnea. Became hypotensive in ED after IV NTG was given for elevated BP. Required intubation and pressor support. Admit to CCU for Acute respiratory failure from Septic Shock requiring intubation and pressor support. CXR concerning for LLL infiltrate - PNA? vs ARDS?    Acute on chronic hypercapnic respiratory failure on MV  Septic shock secondary to aspiration PNA  Pulmonary edema secondary to CHF  HO Schizophrenia  Smoker    [X] TLC  [X] Nichole  [X] intubated  [  ] Sedated  [   ] Pressor    Problem List  Acute on chronic hypercapnic respiratory failure on MV  Septic shock secondary to aspiration PNA - off pressors   Pulmonary edema secondary to HFrEF  HO Schizophrenia  Smoker    - RVP negative  - UA - trace leuks; neg nitrates; Ur Cx neg  - Sputum Cx neg  - Bld cx -neg (12/9)  - CE 0.04>0.05>0.02  - rEEG: Abnormal due to the presence of: generalized slowing as above  - MRSA negative  - All cx negative  - CTH neg  - EEG - gen. slowing    # Septic Shock due to aspiration PNA & Acute on chronic hypercapneic respiratory failure  - Intubated & Vented   - Off pressors  - C/w meropenem (from 12/10)  - Prednisone PO daily (12/10 start)     # Pulmonary edema secondary to CHF   - TTE (12/10) - mod impaired function 30-35%, LV dilated, LV enlarged, mod MR, mild TR, LA dilation, small pericardial effusion, RV pressure 31  - Will need ACEI/ARB; hold for now   - Cardio following: c/w start B-Blocker    # Metabolic alkalosis + resp acidosis  - Check Urine Lytes, calc Ur Anion GAP  - Pt off lasix    # New AF (CHADSVASC-3) now converted to sinus  - PO Amiodarone; B-blocker started  - CTH negative  - LMWH for a/c    # Hypernatremia - improving  - Free water deficit 3.2L; start 400cc free water q6Hr x1 more day; evaluate for sunday 12/16  to lower free water regimen    # DM - insulin infusion  # Hx of HTN - hold BP meds  # Schizophrenia - on ariprazole  # GI ppx     Full Code 58/M hx of DM & HTN & schizophrenia?  presented to ED with SOB with orthopnea. Became hypotensive in ED after IV NTG was given for elevated BP. Required intubation and pressor support. Admit to CCU for Acute respiratory failure from Septic Shock requiring intubation and pressor support. CXR concerning for LLL infiltrate - PNA? vs ARDS?    Acute on chronic hypercapnic respiratory failure on MV  Septic shock secondary to aspiration PNA  Pulmonary edema secondary to CHF  HO Schizophrenia  Smoker    [X] TLC  [X] Nichole  [X] intubated  [X] Sedated  [  ] Pressor    Problem List  Acute on chronic hypercapnic respiratory failure on MV  Septic shock secondary to aspiration PNA - off pressors   Pulmonary edema secondary to HFrEF  HO Schizophrenia  Smoker    - RVP negative  - UA - trace leuks; neg nitrates; Ur Cx neg  - Sputum Cx neg  - Bld cx -neg (12/9)  - CE 0.04>0.05>0.02  - rEEG: Abnormal due to the presence of: generalized slowing as above  - MRSA negative  - All cx negative  - CTH neg  - EEG - gen. slowing    # Septic Shock due to aspiration PNA & Acute on chronic hypercapneic respiratory failure  - Intubated & Sedated - FiO2 reduced  - Off pressors  - C/w meropenem (from 12/10)  - Prednisone PO daily (12/10 start)     # Pulmonary edema secondary to CHF   - TTE (12/10) - mod impaired function 30-35%, LV dilated, LV enlarged, mod MR, mild TR, LA dilation, small pericardial effusion, RV pressure 31  - Will need ACEI/ARB; hold for now   - Cardio following: c/w B-Blocker, dose adjusted    # Metabolic alkalosis + resp acidosis  - Check Urine Lytes, calc Ur Anion GAP  - Pt off lasix    # New AF (CHADSVASC-3) now converted to sinus  - PO Amiodarone; B-blocker started  - CTH negative  - LMWH for a/c    # Tmax 100.4 - pan cx    # Hypernatremia - improved  - Free water deficit 3.2L;   - C/w free water x1 day    # DM - insulin infusion  # Hx of HTN - hold BP meds  # Schizophrenia - on ariprazole  # GI ppx     Full Code 58/M hx of DM & HTN & schizophrenia?  presented to ED with SOB with orthopnea. Became hypotensive in ED after IV NTG was given for elevated BP. Required intubation and pressor support. Admit to CCU for Acute respiratory failure from Septic Shock requiring intubation and pressor support. CXR concerning for LLL infiltrate - PNA? vs ARDS?    Acute on chronic hypercapnic respiratory failure on MV  Septic shock secondary to aspiration PNA  Pulmonary edema secondary to CHF  HO Schizophrenia  Smoker    [X] TLC  [X] Nichole  [X] intubated  [X] Sedated  [  ] Pressor    Problem List  Acute on chronic hypercapnic respiratory failure on MV  Septic shock secondary to aspiration PNA - off pressors   Pulmonary edema secondary to HFrEF  HO Schizophrenia  Smoker    - RVP negative  - UA - trace leuks; neg nitrates; Ur Cx neg  - Sputum Cx neg  - Bld cx -neg (12/9)  - CE 0.04>0.05>0.02  - rEEG: Abnormal due to the presence of: generalized slowing as above  - MRSA negative  - All cx negative  - CTH neg  - EEG - gen. slowing    # Septic Shock due to aspiration PNA & Acute on chronic hypercapneic respiratory failure  - Intubated & Sedated - FiO2 reduced  - Off pressors  - C/w meropenem (from 12/10)  - Prednisone PO daily (12/10 start)     # Pulmonary edema secondary to CHF   - TTE (12/10) - mod impaired function 30-35%, LV dilated, LV enlarged, mod MR, mild TR, LA dilation, small pericardial effusion, RV pressure 31  - Will need ACEI/ARB; hold for now   - Cardio following: c/w B-Blocker, dose adjusted    # Metabolic alkalosis + resp acidosis  - Check Urine Lytes, calc Ur Anion GAP  - Pt off lasix    # New AF (CHADSVASC-3) now converted to sinus  - PO Amiodarone; B-blocker started  - CTH negative  - LMWH for a/c    # Tmax 100.4 - pan cx  # No BM - c/w lactulose q12    # Hypernatremia - improved  - Free water deficit 3.2L;   - C/w free water x1 day    # DM - insulin infusion  # Hx of HTN - hold BP meds  # Schizophrenia - on ariprazole  # GI ppx     Full Code 58/M hx of DM & HTN & schizophrenia?  presented to ED with SOB with orthopnea. Became hypotensive in ED after IV NTG was given for elevated BP. Required intubation and pressor support. Admit to CCU for Acute respiratory failure from Septic Shock requiring intubation and pressor support. CXR concerning for LLL infiltrate - PNA? vs ARDS?    Acute on chronic hypercapnic respiratory failure on MV  Septic shock secondary to aspiration PNA  Pulmonary edema secondary to CHF  HO Schizophrenia  Smoker    [X] TLC  [X] Nichole  [X] intubated  [X] Sedated  [  ] Pressor    Problem List  Acute on chronic hypercapnic respiratory failure on MV  Septic shock secondary to aspiration PNA - off pressors   Pulmonary edema secondary to HFrEF  HO Schizophrenia  Smoker    - RVP negative  - UA - trace leuks; neg nitrates; Ur Cx neg  - Sputum Cx neg  - Bld cx -neg (12/9)  - CE 0.04>0.05>0.02  - rEEG: Abnormal due to the presence of: generalized slowing as above  - MRSA negative  - All cx negative  - CTH neg  - EEG - gen. slowing    # Septic Shock due to aspiration PNA & Acute on chronic hypercapneic respiratory failure  - Taken off sedation today momentarily, pt alert, follows commands appropriately  - Intubated & Sedated - FiO2 reduced  - Off pressors  - C/w meropenem (from 12/10)  - Prednisone PO daily tapering dose (12/10 was start)     # Pulmonary edema secondary to CHF   - TTE (12/10) - mod impaired function 30-35%, LV dilated, LV enlarged, mod MR, mild TR, LA dilation, small pericardial effusion, RV pressure 31  - Will need ACEI/ARB; hold for now   - Cardio following: c/w B-Blocker, dose adjusted    # Metabolic alkalosis + resp acidosis  - Check Urine Lytes, calc Ur Anion GAP  - Pt off lasix    # New AF (CHADSVASC-3) now converted to sinus  - PO Amiodarone; PO B-blocker   - CTH negative  - LMWH for a/c    # Tmax 100.4 - pan cx  # No BM - c/w lactulose q12    # Hypernatremia - improved  - will d/c free water today, reassess daily franko as pt on IV lasix today.    # DM - insulin infusion  # Hx of HTN - hold BP meds  # Schizophrenia - on ariprazole  # GI ppx     Full Code 58/M hx of DM & HTN & schizophrenia?  presented to ED with SOB with orthopnea. Became hypotensive in ED after IV NTG was given for elevated BP. Required intubation and pressor support. Admit to CCU for Acute respiratory failure from Septic Shock requiring intubation and pressor support. CXR concerning for LLL infiltrate - PNA? vs ARDS?    Acute on chronic hypercapnic respiratory failure on MV  Septic shock secondary to aspiration PNA  Pulmonary edema secondary to CHF  HO Schizophrenia  Smoker    [X] TLC  [X] Nichole  [X] intubated  [X] Sedated  [  ] Pressor    Problem List  Acute on chronic hypercapnic respiratory failure on MV  Septic shock secondary to aspiration PNA - off pressors   Pulmonary edema secondary to HFrEF  HO Schizophrenia  Smoker    - RVP negative  - UA - trace leuks; neg nitrates; Ur Cx neg  - Sputum Cx neg  - Bld cx -neg (12/9)  - CE 0.04>0.05>0.02  - rEEG: Abnormal due to the presence of: generalized slowing as above  - MRSA negative  - All cx negative  - CTH neg  - EEG - gen. slowing    # Septic Shock due to aspiration PNA & Acute on chronic hypercapneic respiratory failure  - Taken off sedation today momentarily, pt alert, follows commands appropriately; failed SBT today  - Intubated & Sedated - FiO2 reduced  - Off pressors  - C/w meropenem (from 12/10)  - Prednisone PO daily tapering dose (12/10 was start)     # Pulmonary edema secondary to CHF   - TTE (12/10) - mod impaired function 30-35%, LV dilated, LV enlarged, mod MR, mild TR, LA dilation, small pericardial effusion, RV pressure 31  - Will need ACEI/ARB; hold for now as BP low  - Cardio following: c/w B-Blocker, dose adjusted  - IV lasix 40mg BiD for today    # Metabolic alkalosis + resp acidosis  - Check Urine Lytes, calc Ur Anion GAP    # New AF (CHADSVASC-3) now converted to sinus  - PO Amiodarone; PO B-blocker   - CTH negative  - LMWH for a/c    # Tmax 100.4 - pan cx    # No BM - c/w lactulose q8  # Hypernatremia - improved; d/c free water today, reassess daily franko as pt on IV lasix today.  # DM - insulin infusion  # Hx of HTN - hold BP meds  # Schizophrenia - on ariprazole  # GI ppx     HypoK+ - repleted today; repeat tonight    Full Code

## 2018-12-17 NOTE — CHART NOTE - NSCHARTNOTEFT_GEN_A_CORE
Registered Dietitian Follow-Up     Patient Profile Reviewed                           Yes [x]   No []     Nutrition History Previously Obtained        Yes [x]  No []       Pertinent Subjective Information: Spoke w/ RN who reports that the Pt is tolerating his current TF regimen. Pt is receiving Vital HP @25ml/hr and Propofol was reduced from 30ml/hr to 28ml/hr.      Pertinent Medical Interventions: Pt presented to the ED for SOB. Pt is admitted for acute respiratory failure from septic shock requiring intubation and pressor support. Septic shock 2/2 aspiration PNA, pulmonary edema 2/2 CHF, metabolic alkalosis + resp acidosis, new AF, and hypernatremia (now WNL) noted.      Diet order: NPO w/ TF: Vital HP @25ml/hr (600ml total volume, 600kcals, 53g protein, 504ml water) + 10 Beneprotein packets (250kcals, 60g protein) + Propofol 28ml/hr (739kcals)= 1589kcals, 113g protein, 504ml water.     Anthropometrics:  - Ht. 175.26cm/5'9  - Wt. 96.7kg/213#  - Wt range 91.3kg-99.8kg  - BMI 31.5  - IBW 73kg     Pertinent Lab Data: (12/17) H/H 13.7/41.6, potassium 3.2, chloride 96, BUN 30, creat 0.6, glucose 206, albumin 3.1      Pertinent Meds: lactulose syrup, potassium chloride, docusate sodium, propofol, prednisone, insulin infusion, norepinephrine, pantoprazole     Physical Findings:  - Appearance: intubated  - GI function: RN reports that the Pt has not has a BM today and that she was going to give him lactulose. Abdomen noted as soft/nontender.   - Tubes: OGT/endotracheal   - Oral/Mouth cavity: n/a  - Skin: Obie score 13. Skin intact.      Nutrition Requirements  Weight Used: ideal 73kg     Estimated Energy Needs    Continue [x]  Adjust []  1372-1600kcals/day (PSU 2003b: 60%-70% RMR 2286)     Estimated Protein Needs    Continue [x]  Adjust []  110-146g/day (1.5-2g/IBW)    Estimated Fluid Needs        Continue [x]  Adjust []  as per CCU team     [] Previous Nutrition Diagnosis: Excessive EN             [] Ongoing          [x] Resolved:     Nutrition Intervention: EN      Goal/Expected Outcome: Meet >85% and <105% of estimated needs for 3 days      Indicator/Monitoring: Will monitor nutrition focused physical findings and electrolyte profile. Not at risk. Pt is currently meeting nutritional needs and tolerating the TF well. Will Follow-up in three days due to intubation + receiving propofol.     Recommendations:   Continue receiving Vital HP @25ml/hr and 10 beneprotein packets/day.

## 2018-12-17 NOTE — PROGRESS NOTE ADULT - ASSESSMENT
IMPRESSION:  Acute on chronic hypercapnic respiratory failure on MV  Septic shock secondary to aspiration PNA  Pulmonary edema secondary to HfrEF  HO Schizophrenia  Smoker  hypernatremia    PLAN:    CNS: Daily SAT;     HEENT: Oral care    PULMONARY: HOB at 45 degrees; weaning trial prednisone 30 mg     CARDIOVASCULAR: Keep I<O; FU with cardio;  give lasix 40 mg now     GI: GI prophylaxis.  Feeding as tolerated;    RENAL:  FU lytes; free water     INFECTIOUS DISEASE: Panculture; c/w rex    HEMATOLOGICAL:  DVT prophylaxis lovenox therapeutic     ENDOCRINE:  Follow up FS.  Insulin protocol if needed.    MICU monitoring   D/C TLC;

## 2018-12-17 NOTE — PROGRESS NOTE ADULT - SUBJECTIVE AND OBJECTIVE BOX
Patient is a 58y old  Male who presents with a chief complaint of Septic Shock (17 Dec 2018 07:20)      Over Night Events:  Patient seen and examined still vented on cpap, no pressors      ROS:  See HPI    PHYSICAL EXAM    ICU Vital Signs Last 24 Hrs  T(C): 37.4 (17 Dec 2018 07:00), Max: 38 (16 Dec 2018 11:00)  T(F): 99.3 (17 Dec 2018 07:00), Max: 100.4 (16 Dec 2018 11:00)  HR: 60 (17 Dec 2018 08:35) (50 - 65)  BP: 119/60 (17 Dec 2018 05:00) (108/59 - 145/69)  BP(mean): 84 (17 Dec 2018 05:00) (79 - 99)  ABP: --  ABP(mean): --  RR: 22 (17 Dec 2018 07:00) (2 - 23)  SpO2: 98% (17 Dec 2018 08:35) (93% - 98%)      General: awake follow command   HEENT:   rod    Et tube          Lymph Nodes: NO cervical LN   Lungs: Bilateral BS  Cardiovascular: Regular   Abdomen: Soft, Positive BS  Extremities: No clubbing   Skin: warm   Neurological: no focal deficit   Musculoskeletal: move all ext     I&O's Detail    16 Dec 2018 07:01  -  17 Dec 2018 07:00  --------------------------------------------------------  IN:    Enteral Tube Flush: 460 mL    Free Water: 1200 mL    insulin Infusion: 40 mL    propofol Infusion: 672 mL    Solution: 150 mL    Vital High Protein: 550 mL  Total IN: 3072 mL    OUT:    Indwelling Catheter - Urethral: 3410 mL  Total OUT: 3410 mL    Total NET: -338 mL      17 Dec 2018 07:01  -  17 Dec 2018 09:41  --------------------------------------------------------  IN:  Total IN: 0 mL    OUT:    Indwelling Catheter - Urethral: 350 mL  Total OUT: 350 mL    Total NET: -350 mL          LABS:                          13.7   9.30  )-----------( 236      ( 17 Dec 2018 05:40 )             41.6         17 Dec 2018 05:30    145    |  96     |  30     ----------------------------<  206    3.2     |  37     |  0.6      Ca    8.8        17 Dec 2018 05:30  Phos  2.4       17 Dec 2018 05:30  Mg     2.3       17 Dec 2018 05:30    TPro  5.6    /  Alb  3.1    /  TBili  0.5    /  DBili  x      /  AST  24     /  ALT  33     /  AlkPhos  81     17 Dec 2018 05:30  Amylase x     lipase x                                                                                                                                                                                                 Mode: CPAP with PS  FiO2: 45  PEEP: 5  PS: 7                                      ABG - ( 17 Dec 2018 09:24 )  pH, Arterial: 7.54  pH, Blood: x     /  pCO2: 47    /  pO2: 59    / HCO3: 40    / Base Excess: 15.4  /  SaO2: 91                  MEDICATIONS  (STANDING):  amiodarone    Tablet 200 milliGRAM(s) Oral daily  ARIPiprazole 20 milliGRAM(s) Oral daily  chlorhexidine 0.12% Liquid 15 milliLiter(s) Oral Mucosa two times a day  chlorhexidine 4% Liquid 1 Application(s) Topical <User Schedule>  clotrimazole/betamethasone Cream 1 Application(s) Topical two times a day  dextrose 50% Injectable 50 milliLiter(s) IV Push every 15 minutes  dextrose 50% Injectable 25 milliLiter(s) IV Push every 15 minutes  docusate sodium Liquid 200 milliGRAM(s) Enteral Tube two times a day  enoxaparin Injectable 100 milliGRAM(s) SubCutaneous <User Schedule>  insulin Infusion 2 Unit(s)/Hr (2 mL/Hr) IV Continuous <Continuous>  lactulose Syrup 20 Gram(s) Oral every 12 hours  meropenem  IVPB      meropenem  IVPB 1000 milliGRAM(s) IV Intermittent every 8 hours  metoprolol tartrate 12.5 milliGRAM(s) Oral two times a day  norepinephrine Infusion 0.05 MICROgram(s)/kG/Min (9.356 mL/Hr) IV Continuous <Continuous>  pantoprazole   Suspension 40 milliGRAM(s) Oral daily  potassium chloride    Tablet ER 40 milliEquivalent(s) Oral every 4 hours  predniSONE   Tablet 50 milliGRAM(s) Oral daily  propofol Infusion 10 MICROgram(s)/kG/Min (5.802 mL/Hr) IV Continuous <Continuous>    MEDICATIONS  (PRN):  acetaminophen    Suspension .. 650 milliGRAM(s) Enteral Tube every 6 hours PRN Temp greater or equal to 38C (100.4F)  ALBUTerol   0.5% 2.5 milliGRAM(s) Nebulizer every 4 hours PRN Shortness of Breath and/or Wheezing          Xrays:  TLC:  OG:  ET tube:                                                                                     b/l effsuion mild    ECHO:

## 2018-12-17 NOTE — PROGRESS NOTE ADULT - SUBJECTIVE AND OBJECTIVE BOX
SUBJECTIVE:    Patient is a 58y old Male who presents with a chief complaint of Septic Shock (17 Dec 2018 07:20)    Currently admitted to medicine with the primary diagnosis of Acute respiratory failure with hypoxia and hypercapnia     Today is hospital day 9d. This morning he is resting comfortably in bed and reports no new issues or overnight events.     PAST MEDICAL & SURGICAL HISTORY  ETOH abuse  HLD (hyperlipidemia)  Hypertension, unspecified type  COPD (chronic obstructive pulmonary disease)  Schizophrenia  Psych & behavrl factors assoc w disord or dis classd elswhr  Diabetes  No significant past surgical history    SOCIAL HISTORY:  Negative for smoking/alcohol/drug use.     ALLERGIES:  No Known Allergies    MEDICATIONS:  STANDING MEDICATIONS  amiodarone    Tablet 200 milliGRAM(s) Oral daily  ARIPiprazole 20 milliGRAM(s) Oral daily  chlorhexidine 0.12% Liquid 15 milliLiter(s) Oral Mucosa two times a day  chlorhexidine 4% Liquid 1 Application(s) Topical <User Schedule>  clotrimazole/betamethasone Cream 1 Application(s) Topical two times a day  dextrose 50% Injectable 50 milliLiter(s) IV Push every 15 minutes  dextrose 50% Injectable 25 milliLiter(s) IV Push every 15 minutes  docusate sodium Liquid 200 milliGRAM(s) Enteral Tube two times a day  enoxaparin Injectable 100 milliGRAM(s) SubCutaneous <User Schedule>  insulin Infusion 2 Unit(s)/Hr IV Continuous <Continuous>  lactulose Syrup 20 Gram(s) Enteral Tube daily  meropenem  IVPB      meropenem  IVPB 1000 milliGRAM(s) IV Intermittent every 8 hours  metoprolol tartrate 12.5 milliGRAM(s) Oral two times a day  norepinephrine Infusion 0.05 MICROgram(s)/kG/Min IV Continuous <Continuous>  pantoprazole   Suspension 40 milliGRAM(s) Oral daily  potassium chloride    Tablet ER 40 milliEquivalent(s) Oral every 4 hours  predniSONE   Tablet 50 milliGRAM(s) Oral daily  propofol Infusion 10 MICROgram(s)/kG/Min IV Continuous <Continuous>    PRN MEDICATIONS  acetaminophen    Suspension .. 650 milliGRAM(s) Enteral Tube every 6 hours PRN  ALBUTerol   0.5% 2.5 milliGRAM(s) Nebulizer every 4 hours PRN    VITALS:   ICU Vital Signs Last 24 Hrs  T(C): 37.4 (17 Dec 2018 07:00), Max: 38 (16 Dec 2018 11:00)  T(F): 99.3 (17 Dec 2018 07:00), Max: 100.4 (16 Dec 2018 11:00)  HR: 58 (17 Dec 2018 05:00) (50 - 69)  BP: 119/60 (17 Dec 2018 05:00) (108/59 - 151/70)  BP(mean): 84 (17 Dec 2018 05:00) (79 - 101)  RR: 22 (17 Dec 2018 07:00) (2 - 23)  SpO2: 97% (17 Dec 2018 05:07) (93% - 98%)      LABS:                        13.7   9.30  )-----------( 236      ( 17 Dec 2018 05:40 )             41.6     12-17    145  |  96<L>  |  30<H>  ----------------------------<  206<H>  3.2<L>   |  37<H>  |  0.6<L>    Ca    8.8      17 Dec 2018 05:30  Phos  2.4     12-17  Mg     2.3     12-17    TPro  5.6<L>  /  Alb  3.1<L>  /  TBili  0.5  /  DBili  x   /  AST  24  /  ALT  33  /  AlkPhos  81  12-17        ABG - ( 17 Dec 2018 05:54 )  pH, Arterial: 7.51  pH, Blood: x     /  pCO2: 52    /  pO2: 83    / HCO3: 41    / Base Excess: 15.4  /  SaO2: 96                  RADIOLOGY:    PHYSICAL EXAM:  GEN: intubated & sedated  LUNGS: MV sounds detected b/l  HEART: S1/S2 present. RRR. no Murmur  ABD: Soft, non-tender, non-distended. Bowel sounds present  EXT: no LE edema  NEURO: PERRL, sedated

## 2018-12-17 NOTE — PROGRESS NOTE ADULT - ASSESSMENT
Patient  sedated on vent. Echo EF 30 -35 %. On beta . HR acceptable.  Will lower dose. Possible ace or arb when stable.  Now in NSR. PO amiodarone . Lovenox for now. When off sedation still not appropriate .No further v-tach. Check cxr.  Hold again sedation today if able.  Prognosis guarded

## 2018-12-17 NOTE — PROGRESS NOTE ADULT - SUBJECTIVE AND OBJECTIVE BOX
Patient is a 58y old  Male who presents with a chief complaint of Septic Shock (17 Dec 2018 07:05)      T(F): 99.3 (12-17-18 @ 07:00), Max: 100.4 (12-16-18 @ 11:00)  HR: 58 (12-17-18 @ 05:00)  BP: 119/60 (12-17-18 @ 05:00)  RR: 22 (12-17-18 @ 07:00)  SpO2: 97% (12-17-18 @ 05:07) (93% - 98%)    PHYSICAL EXAM:  GENERAL: NAD, well-groomed, well-developed  HEAD:  Atraumatic, Normocephalic  EYES: EOMI, PERRLA, conjunctiva and sclera clear  ENMT: No tonsillar erythema, exudates, or enlargement; Moist mucous membranes, Good dentition, No lesions  NECK: Supple, No JVD, Normal thyroid  NERVOUS SYSTEM:  Alert & Oriented X3,  Motor Strength 5/5 B/L upper and lower extremities  CHEST/LUNG: Clear to percussion bilaterally; No rales, rhonchi, wheezing, or rubs  HEART: Regular rate and rhythm; No murmurs, rubs, or gallops  ABDOMEN: Soft, Nontender, Nondistended; Bowel sounds present  EXTREMITIES:   No clubbing, cyanosis, or edema  LYMPH: No lymphadenopathy noted  SKIN: No rashes or lesions    labs  12-16    145  |  98  |  29<H>  ----------------------------<  170<H>  3.5   |  37<H>  |  0.5<L>    Ca    9.1      16 Dec 2018 06:03  Phos  2.9     12-16  Mg     2.2     12-16    TPro  6.0  /  Alb  3.2<L>  /  TBili  0.5  /  DBili  x   /  AST  21  /  ALT  31  /  AlkPhos  80  12-16                          13.7   9.30  )-----------( 236      ( 17 Dec 2018 05:40 )             41.6         Mode: Auto Mode: PRVC/ Volume Support  RR (machine): 20  TV (machine): 450  FiO2: 45  PEEP: 5  MAP: 8  PIP: 15        acetaminophen    Suspension .. 650 milliGRAM(s) Enteral Tube every 6 hours PRN  ALBUTerol   0.5% 2.5 milliGRAM(s) Nebulizer every 4 hours PRN  amiodarone    Tablet 200 milliGRAM(s) Oral daily  ARIPiprazole 20 milliGRAM(s) Oral daily  chlorhexidine 0.12% Liquid 15 milliLiter(s) Oral Mucosa two times a day  chlorhexidine 4% Liquid 1 Application(s) Topical <User Schedule>  clotrimazole/betamethasone Cream 1 Application(s) Topical two times a day  dextrose 50% Injectable 50 milliLiter(s) IV Push every 15 minutes  dextrose 50% Injectable 25 milliLiter(s) IV Push every 15 minutes  docusate sodium Liquid 200 milliGRAM(s) Enteral Tube two times a day  enoxaparin Injectable 100 milliGRAM(s) SubCutaneous <User Schedule>  insulin Infusion 2 Unit(s)/Hr IV Continuous <Continuous>  lactulose Syrup 20 Gram(s) Enteral Tube daily  meropenem  IVPB      meropenem  IVPB 1000 milliGRAM(s) IV Intermittent every 8 hours  metoprolol tartrate 12.5 milliGRAM(s) Oral two times a day  norepinephrine Infusion 0.05 MICROgram(s)/kG/Min IV Continuous <Continuous>  pantoprazole   Suspension 40 milliGRAM(s) Oral daily  predniSONE   Tablet 50 milliGRAM(s) Oral daily  propofol Infusion 10 MICROgram(s)/kG/Min IV Continuous <Continuous>

## 2018-12-18 LAB
ALBUMIN SERPL ELPH-MCNC: 3.4 G/DL — LOW (ref 3.5–5.2)
ALP SERPL-CCNC: 81 U/L — SIGNIFICANT CHANGE UP (ref 30–115)
ALT FLD-CCNC: 50 U/L — HIGH (ref 0–41)
ANION GAP SERPL CALC-SCNC: 11 MMOL/L — SIGNIFICANT CHANGE UP (ref 7–14)
ANION GAP SERPL CALC-SCNC: 9 MMOL/L — SIGNIFICANT CHANGE UP (ref 7–14)
AST SERPL-CCNC: 39 U/L — SIGNIFICANT CHANGE UP (ref 0–41)
BASE EXCESS BLDA CALC-SCNC: 16.2 MMOL/L — HIGH (ref -2–2)
BILIRUB SERPL-MCNC: 0.4 MG/DL — SIGNIFICANT CHANGE UP (ref 0.2–1.2)
BUN SERPL-MCNC: 35 MG/DL — HIGH (ref 10–20)
BUN SERPL-MCNC: 39 MG/DL — HIGH (ref 10–20)
CALCIUM SERPL-MCNC: 9.1 MG/DL — SIGNIFICANT CHANGE UP (ref 8.5–10.1)
CALCIUM SERPL-MCNC: 9.2 MG/DL — SIGNIFICANT CHANGE UP (ref 8.5–10.1)
CHLORIDE SERPL-SCNC: 103 MMOL/L — SIGNIFICANT CHANGE UP (ref 98–110)
CHLORIDE SERPL-SCNC: 98 MMOL/L — SIGNIFICANT CHANGE UP (ref 98–110)
CO2 SERPL-SCNC: 37 MMOL/L — HIGH (ref 17–32)
CO2 SERPL-SCNC: 40 MMOL/L — HIGH (ref 17–32)
CREAT SERPL-MCNC: 0.6 MG/DL — LOW (ref 0.7–1.5)
CREAT SERPL-MCNC: 0.7 MG/DL — SIGNIFICANT CHANGE UP (ref 0.7–1.5)
GLUCOSE BLDC GLUCOMTR-MCNC: 133 MG/DL — HIGH (ref 70–99)
GLUCOSE BLDC GLUCOMTR-MCNC: 135 MG/DL — HIGH (ref 70–99)
GLUCOSE BLDC GLUCOMTR-MCNC: 153 MG/DL — HIGH (ref 70–99)
GLUCOSE BLDC GLUCOMTR-MCNC: 165 MG/DL — HIGH (ref 70–99)
GLUCOSE BLDC GLUCOMTR-MCNC: 177 MG/DL — HIGH (ref 70–99)
GLUCOSE BLDC GLUCOMTR-MCNC: 183 MG/DL — HIGH (ref 70–99)
GLUCOSE BLDC GLUCOMTR-MCNC: 194 MG/DL — HIGH (ref 70–99)
GLUCOSE BLDC GLUCOMTR-MCNC: 205 MG/DL — HIGH (ref 70–99)
GLUCOSE SERPL-MCNC: 153 MG/DL — HIGH (ref 70–99)
GLUCOSE SERPL-MCNC: 193 MG/DL — HIGH (ref 70–99)
GRAM STN FLD: SIGNIFICANT CHANGE UP
GRAM STN FLD: SIGNIFICANT CHANGE UP
HCO3 BLDA-SCNC: 43 MMOL/L — CRITICAL HIGH (ref 23–27)
HCT VFR BLD CALC: 44.6 % — SIGNIFICANT CHANGE UP (ref 42–52)
HGB BLD-MCNC: 14.8 G/DL — SIGNIFICANT CHANGE UP (ref 14–18)
HOROWITZ INDEX BLDA+IHG-RTO: 45 — SIGNIFICANT CHANGE UP
MAGNESIUM SERPL-MCNC: 2.4 MG/DL — SIGNIFICANT CHANGE UP (ref 1.8–2.4)
MCHC RBC-ENTMCNC: 30.5 PG — SIGNIFICANT CHANGE UP (ref 27–31)
MCHC RBC-ENTMCNC: 33.2 G/DL — SIGNIFICANT CHANGE UP (ref 32–37)
MCV RBC AUTO: 91.8 FL — SIGNIFICANT CHANGE UP (ref 80–94)
NRBC # BLD: 0 /100 WBCS — SIGNIFICANT CHANGE UP (ref 0–0)
PCO2 BLDA: 58 MMHG — HIGH (ref 38–42)
PH BLDA: 7.49 — HIGH (ref 7.38–7.42)
PHOSPHATE SERPL-MCNC: 3.4 MG/DL — SIGNIFICANT CHANGE UP (ref 2.1–4.9)
PLATELET # BLD AUTO: 238 K/UL — SIGNIFICANT CHANGE UP (ref 130–400)
PO2 BLDA: 86 MMHG — SIGNIFICANT CHANGE UP (ref 78–95)
POTASSIUM SERPL-MCNC: 3.4 MMOL/L — LOW (ref 3.5–5)
POTASSIUM SERPL-MCNC: 4 MMOL/L — SIGNIFICANT CHANGE UP (ref 3.5–5)
POTASSIUM SERPL-SCNC: 3.4 MMOL/L — LOW (ref 3.5–5)
POTASSIUM SERPL-SCNC: 4 MMOL/L — SIGNIFICANT CHANGE UP (ref 3.5–5)
PROT SERPL-MCNC: 6.5 G/DL — SIGNIFICANT CHANGE UP (ref 6–8)
RBC # BLD: 4.86 M/UL — SIGNIFICANT CHANGE UP (ref 4.7–6.1)
RBC # FLD: 13.2 % — SIGNIFICANT CHANGE UP (ref 11.5–14.5)
SAO2 % BLDA: 96 % — SIGNIFICANT CHANGE UP (ref 94–98)
SODIUM SERPL-SCNC: 149 MMOL/L — HIGH (ref 135–146)
SODIUM SERPL-SCNC: 149 MMOL/L — HIGH (ref 135–146)
SPECIMEN SOURCE: SIGNIFICANT CHANGE UP
SPECIMEN SOURCE: SIGNIFICANT CHANGE UP
WBC # BLD: 8.64 K/UL — SIGNIFICANT CHANGE UP (ref 4.8–10.8)
WBC # FLD AUTO: 8.64 K/UL — SIGNIFICANT CHANGE UP (ref 4.8–10.8)

## 2018-12-18 RX ORDER — POTASSIUM CHLORIDE 20 MEQ
40 PACKET (EA) ORAL EVERY 4 HOURS
Qty: 0 | Refills: 0 | Status: COMPLETED | OUTPATIENT
Start: 2018-12-18 | End: 2018-12-18

## 2018-12-18 RX ORDER — LIDOCAINE 4 G/100G
20 CREAM TOPICAL ONCE
Qty: 0 | Refills: 0 | Status: COMPLETED | OUTPATIENT
Start: 2018-12-18 | End: 2018-12-18

## 2018-12-18 RX ADMIN — AMIODARONE HYDROCHLORIDE 200 MILLIGRAM(S): 400 TABLET ORAL at 06:11

## 2018-12-18 RX ADMIN — Medication 12.5 MILLIGRAM(S): at 06:08

## 2018-12-18 RX ADMIN — Medication 1 APPLICATION(S): at 17:55

## 2018-12-18 RX ADMIN — Medication 200 MILLIGRAM(S): at 06:07

## 2018-12-18 RX ADMIN — ENOXAPARIN SODIUM 100 MILLIGRAM(S): 100 INJECTION SUBCUTANEOUS at 17:54

## 2018-12-18 RX ADMIN — Medication 40 MILLIEQUIVALENT(S): at 10:27

## 2018-12-18 RX ADMIN — MEROPENEM 100 MILLIGRAM(S): 1 INJECTION INTRAVENOUS at 14:26

## 2018-12-18 RX ADMIN — LACTULOSE 20 GRAM(S): 10 SOLUTION ORAL at 14:27

## 2018-12-18 RX ADMIN — Medication 200 MILLIGRAM(S): at 17:54

## 2018-12-18 RX ADMIN — LACTULOSE 20 GRAM(S): 10 SOLUTION ORAL at 21:54

## 2018-12-18 RX ADMIN — ENOXAPARIN SODIUM 100 MILLIGRAM(S): 100 INJECTION SUBCUTANEOUS at 06:10

## 2018-12-18 RX ADMIN — CHLORHEXIDINE GLUCONATE 15 MILLILITER(S): 213 SOLUTION TOPICAL at 06:09

## 2018-12-18 RX ADMIN — PROPOFOL 5.8 MICROGRAM(S)/KG/MIN: 10 INJECTION, EMULSION INTRAVENOUS at 07:00

## 2018-12-18 RX ADMIN — MEROPENEM 100 MILLIGRAM(S): 1 INJECTION INTRAVENOUS at 06:10

## 2018-12-18 RX ADMIN — MEROPENEM 100 MILLIGRAM(S): 1 INJECTION INTRAVENOUS at 21:54

## 2018-12-18 RX ADMIN — Medication 40 MILLIEQUIVALENT(S): at 14:27

## 2018-12-18 RX ADMIN — CHLORHEXIDINE GLUCONATE 1 APPLICATION(S): 213 SOLUTION TOPICAL at 01:39

## 2018-12-18 RX ADMIN — Medication 12.5 MILLIGRAM(S): at 17:54

## 2018-12-18 RX ADMIN — Medication 30 MILLIGRAM(S): at 06:10

## 2018-12-18 RX ADMIN — PANTOPRAZOLE SODIUM 40 MILLIGRAM(S): 20 TABLET, DELAYED RELEASE ORAL at 12:22

## 2018-12-18 RX ADMIN — CHLORHEXIDINE GLUCONATE 15 MILLILITER(S): 213 SOLUTION TOPICAL at 17:55

## 2018-12-18 RX ADMIN — INSULIN HUMAN 2 UNIT(S)/HR: 100 INJECTION, SOLUTION SUBCUTANEOUS at 07:00

## 2018-12-18 RX ADMIN — ARIPIPRAZOLE 20 MILLIGRAM(S): 15 TABLET ORAL at 12:22

## 2018-12-18 RX ADMIN — LIDOCAINE 20 MILLILITER(S): 4 CREAM TOPICAL at 12:20

## 2018-12-18 RX ADMIN — LACTULOSE 20 GRAM(S): 10 SOLUTION ORAL at 06:07

## 2018-12-18 NOTE — PROGRESS NOTE ADULT - ASSESSMENT
58/M hx of DM & HTN & schizophrenia?  presented to ED with SOB with orthopnea. Became hypotensive in ED after IV NTG was given for elevated BP. Required intubation and pressor support. Admit to CCU for Acute respiratory failure from Septic Shock requiring intubation and pressor support. CXR concerning for LLL infiltrate - PNA? vs ARDS?    Acute on chronic hypercapnic respiratory failure on MV  Septic shock secondary to aspiration PNA  Pulmonary edema secondary to CHF  HO Schizophrenia  Smoker    [X] TLC  [X] Nichole  [X] intubated  [X] Sedated  [  ] Pressor    Problem List  Acute on chronic hypercapnic respiratory failure on MV  Septic shock secondary to aspiration PNA - off pressors   Pulmonary edema secondary to HFrEF  HO Schizophrenia  Smoker    - RVP negative  - UA - trace leuks; neg nitrates; Ur Cx neg  - Sputum Cx neg  - Bld cx -neg (12/9)  - CE 0.04>0.05>0.02  - rEEG: Abnormal due to the presence of: generalized slowing as above  - MRSA negative  - All cx negative  - CTH neg  - EEG - gen. slowing    # Septic Shock due to aspiration PNA & Acute on chronic hypercapneic respiratory failure  - Taken off sedation today momentarily, pt alert, follows commands appropriately; failed SBT today  - Intubated & Sedated - FiO2 reduced  - Off pressors  - C/w meropenem (from 12/10)  - Prednisone PO daily tapering dose (12/10 was start)     # Pulmonary edema secondary to CHF   - TTE (12/10) - mod impaired function 30-35%, LV dilated, LV enlarged, mod MR, mild TR, LA dilation, small pericardial effusion, RV pressure 31  - Will need ACEI/ARB; hold for now as BP low  - Cardio following: c/w B-Blocker, dose adjusted  - IV lasix 40mg BiD for today    # Metabolic alkalosis + resp acidosis  - Check Urine Lytes, calc Ur Anion GAP    # New AF (CHADSVASC-3) now converted to sinus  - PO Amiodarone; PO B-blocker   - CTH negative  - LMWH for a/c    # Tmax 100.6 - f/u cx    # No BM - c/w lactulose q8  # Hypernatremia - FWD 3.3L; start free water 250cc Q4H    # DM - insulin infusion  # Hx of HTN - hold BP meds; pt on sedation; BP acceptable  # Schizophrenia - on ariprazole  # GI ppx       Full Code 58/M hx of DM & HTN & schizophrenia?  presented to ED with SOB with orthopnea. Became hypotensive in ED after IV NTG was given for elevated BP. Required intubation and pressor support. Admit to CCU for Acute respiratory failure from Septic Shock requiring intubation and pressor support. CXR concerning for LLL infiltrate - PNA? vs ARDS?    Acute on chronic hypercapnic respiratory failure on MV  Septic shock secondary to aspiration PNA  Pulmonary edema secondary to CHF  HO Schizophrenia  Smoker    [X] TLC  [X] Nichole  [X] intubated  [X] Sedated  [  ] Pressor    Problem List  Acute on chronic hypercapnic respiratory failure on MV  Septic shock secondary to aspiration PNA - off pressors   Pulmonary edema secondary to HFrEF  HO Schizophrenia  Smoker    - RVP negative  - UA - trace leuks; neg nitrates; Ur Cx neg  - Sputum Cx neg  - Bld cx -neg (12/9)  - CE 0.04>0.05>0.02  - rEEG: Abnormal due to the presence of: generalized slowing as above  - MRSA negative  - All cx negative  - CTH neg  - EEG - gen. slowing    # Septic Shock due to aspiration PNA & Acute on chronic hypercapneic respiratory failure  - Intubated & Sedated - FiO2 reduced  - Off pressors  - C/w meropenem (from 12/10)  - Prednisone PO daily tapering dose (12/10 was start)   - Bronchoscopy today - as significant amount of secretions; failed SBT yesterday (12/17)    # Pulmonary edema secondary to CHF   - TTE (12/10) - mod impaired function 30-35%, LV dilated, LV enlarged, mod MR, mild TR, LA dilation, small pericardial effusion, RV pressure 31  - Will need ACEI/ARB; hold for now as BP low  - Cardio following: c/w B-Blocker, dose adjusted  - Hold lasix today    # Metabolic alkalosis + resp acidosis  - Low urine Cl- ; likely due to lasix  - No IVF, will hold lasix    # New AF (CHADSVASC-3) now converted to sinus  - PO Amiodarone; PO B-blocker   - CTH negative  - LMWH for a/c    # Tmax 100.6 - f/u cx    # No BM - c/w lactulose q8  # Hypernatremia - FWD 3.3L; start free water 250cc Q4H    # DM - insulin infusion  # Hx of HTN - hold BP meds; pt on sedation; BP acceptable  # Schizophrenia - on ariprazole  # GI ppx     #Potassium repleted    Full Code 58/M hx of DM & HTN & schizophrenia?  presented to ED with SOB with orthopnea. Became hypotensive in ED after IV NTG was given for elevated BP. Required intubation and pressor support. Admit to CCU for Acute respiratory failure from Septic Shock requiring intubation and pressor support. CXR concerning for LLL infiltrate - PNA? vs ARDS?    Acute on chronic hypercapnic respiratory failure on MV  Septic shock secondary to aspiration PNA  Pulmonary edema secondary to CHF  HO Schizophrenia  Smoker    [X] TLC  [X] Nichole  [X] intubated  [X] Sedated  [  ] Pressor    Problem List  Acute on chronic hypercapnic respiratory failure on MV  Septic shock secondary to aspiration PNA - off pressors   Pulmonary edema secondary to HFrEF  HO Schizophrenia  Smoker    - RVP negative  - UA - trace leuks; neg nitrates; Ur Cx neg  - Sputum Cx neg  - Bld cx -neg (12/9)  - CE 0.04>0.05>0.02  - rEEG: Abnormal due to the presence of: generalized slowing as above  - MRSA negative  - All cx negative  - CTH neg  - EEG - gen. slowing    # Septic Shock due to aspiration PNA & Acute on chronic hypercapneic respiratory failure  - Intubated & Sedated - FiO2 reduced  - Off pressors  - C/w meropenem (from 12/10)  - Prednisone PO daily tapering dose (12/10 was start)   - Bronchoscopy (12/18)- All bronchi were patent with no endobronchial lesions and no mucosal lesions noted.  The Left tracheobronchial tree was revealed thin secretions at the left upper and lower lobe segments;     # Pulmonary edema secondary to CHF   - TTE (12/10) - mod impaired function 30-35%, LV dilated, LV enlarged, mod MR, mild TR, LA dilation, small pericardial effusion, RV pressure 31  - Will need ACEI/ARB; hold for now as BP low  - Cardio following: c/w B-Blocker, dose adjusted  - Hold lasix today    # Metabolic alkalosis + resp acidosis  - Low urine Cl- ; likely due to lasix  - No IVF, will hold lasix    # New AF (CHADSVASC-3) now converted to sinus  - PO Amiodarone; PO B-blocker   - CTH negative  - LMWH for a/c    # Tmax 100.6 - f/u cx    # No BM - c/w lactulose q8  # Hypernatremia - FWD 3.3L; start free water 250cc Q4H    # DM - insulin infusion  # Hx of HTN - hold BP meds; pt on sedation; BP acceptable  # Schizophrenia - on ariprazole  # GI ppx     #Potassium repleted    Full Code

## 2018-12-18 NOTE — PROGRESS NOTE ADULT - ASSESSMENT
IMPRESSION:  Acute on chronic hypercapnic respiratory failure on MV  Septic shock secondary to aspiration PNA  Pulmonary edema secondary to HfrEF  HO Schizophrenia  Smoker  hypernatremia    PLAN:    CNS: Daily SAT;     HEENT: Oral care    PULMONARY: HOB at 45 degrees; will do bronch now possible weaning trial today after bronch     CARDIOVASCULAR: Keep I<O; FU with cardio need cardiac cath     GI: GI prophylaxis.  Feeding as tolerated;    RENAL:  FU lytes; free water     INFECTIOUS DISEASE: Panculture; c/w rex    HEMATOLOGICAL:  DVT prophylaxis lovenox therapeutic     ENDOCRINE:  Follow up FS.  Insulin protocol if needed.    MICU monitoring IMPRESSION:  Acute on chronic hypercapnic respiratory failure on MV  Septic shock secondary to aspiration PNA  Pulmonary edema secondary to HfrEF  HO Schizophrenia  Smoker  hypernatremia    PLAN:    CNS: Daily SAT;     HEENT: Oral care    PULMONARY: HOB at 45 degrees; will do bronch now possible weaning trial today after bronch     CARDIOVASCULAR: Keep I<O; FU with cardio need cardiac cath     GI: GI prophylaxis.  Feeding as tolerated; free WATER VIA NG     RENAL:  FU lytes; free water FOLLOW BMP , NA     INFECTIOUS DISEASE: Panculture; c/w rex    HEMATOLOGICAL:  DVT prophylaxis lovenox therapeutic     ENDOCRINE:  Follow up FS.  Insulin protocol if needed.    MICU monitoring

## 2018-12-18 NOTE — PROGRESS NOTE ADULT - SUBJECTIVE AND OBJECTIVE BOX
Patient is a 58y old  Male who presents with a chief complaint of Septic Shock (18 Dec 2018 07:31)      T(F): 99 (12-18-18 @ 07:01), Max: 100.6 (12-17-18 @ 11:00)  HR: 61 (12-18-18 @ 04:59)  BP: 119/64 (12-18-18 @ 04:59)  RR: 20 (12-18-18 @ 07:01)  SpO2: 99% (12-18-18 @ 05:07) (92% - 99%)    PHYSICAL EXAM:  GENERAL: NAD, well-groomed, well-developed  HEAD:  Atraumatic, Normocephalic  EYES: EOMI, PERRLA, conjunctiva and sclera clear  ENMT: No tonsillar erythema, exudates, or enlargement; Moist mucous membranes, Good dentition, No lesions  NECK: Supple, No JVD, Normal thyroid  NERVOUS SYSTEM:  Alert & Oriented X3,  Motor Strength 5/5 B/L upper and lower extremities  CHEST/LUNG: Clear to percussion bilaterally; No rales, rhonchi, wheezing, or rubs  HEART: Regular rate and rhythm; No murmurs, rubs, or gallops  ABDOMEN: Soft, Nontender, Nondistended; Bowel sounds present  EXTREMITIES:   No clubbing, cyanosis, or edema  LYMPH: No lymphadenopathy noted  SKIN: No rashes or lesions    labs  12-18    149<H>  |  98  |  35<H>  ----------------------------<  193<H>  3.4<L>   |  40<H>  |  0.6<L>    Ca    9.2      18 Dec 2018 05:57  Phos  3.4     12-18  Mg     2.4     12-18    TPro  6.5  /  Alb  3.4<L>  /  TBili  0.4  /  DBili  x   /  AST  39  /  ALT  50<H>  /  AlkPhos  81  12-18                          14.8   8.64  )-----------( 238      ( 18 Dec 2018 05:57 )             44.6         Mode: Auto Mode: PRVC/ Volume Support  RR (machine): 20  TV (machine): 450  FiO2: 45  PEEP: 8  MAP: 11  PIP: 19        acetaminophen    Suspension .. 650 milliGRAM(s) Enteral Tube every 6 hours PRN  ALBUTerol   0.5% 2.5 milliGRAM(s) Nebulizer every 4 hours PRN  amiodarone    Tablet 200 milliGRAM(s) Oral daily  ARIPiprazole 20 milliGRAM(s) Oral daily  chlorhexidine 0.12% Liquid 15 milliLiter(s) Oral Mucosa two times a day  chlorhexidine 4% Liquid 1 Application(s) Topical <User Schedule>  clotrimazole/betamethasone Cream 1 Application(s) Topical two times a day  dextrose 50% Injectable 50 milliLiter(s) IV Push every 15 minutes  dextrose 50% Injectable 25 milliLiter(s) IV Push every 15 minutes  docusate sodium Liquid 200 milliGRAM(s) Enteral Tube two times a day  enoxaparin Injectable 100 milliGRAM(s) SubCutaneous <User Schedule>  insulin Infusion 2 Unit(s)/Hr IV Continuous <Continuous>  lactulose Syrup 20 Gram(s) Oral every 8 hours  meropenem  IVPB      meropenem  IVPB 1000 milliGRAM(s) IV Intermittent every 8 hours  metoprolol tartrate 12.5 milliGRAM(s) Oral two times a day  norepinephrine Infusion 0.05 MICROgram(s)/kG/Min IV Continuous <Continuous>  pantoprazole   Suspension 40 milliGRAM(s) Oral daily  potassium chloride   Powder 40 milliEquivalent(s) Oral every 4 hours  predniSONE   Tablet 30 milliGRAM(s) Oral daily  propofol Infusion 10 MICROgram(s)/kG/Min IV Continuous <Continuous>

## 2018-12-18 NOTE — CHART NOTE - NSCHARTNOTEFT_GEN_A_CORE
DATE OF PROCEDURE: 12/18/18    PREOPERATIVE DIAGNOSIS:  Left LL infiltrates    POSTOPERATIVE DIAGNOSES:       PROCEDURE PERFORMED:  Bronchoscopy and washing.         Attending: Dr Olivares         ASSISTANT: Dr Cespedes         ANESTHESIA:    CONSENT: After explanining the risk and benefit the consent was obtained from Sister.      The patient had been previously intubated and was on mechanical ventilatory support. He was on sedation, which was continued and adjusted during the procedure. His FiO2 was increased to 100% during the procedure. The  fiberoptic bronchoscope was introduced through an endotracheal tube adaptor and the tip of the endotracheal tube was noted to be in good position above the kim.   The Right tracheobronchial tree was inspected closely to the level of the subsegmental bronchi. All bronchi were patent with no endobronchial lesions and no mucosal lesions noted.   The Left tracheobronchial tree was revealed thin secretions at the left upper and lower lobe segments;   The bronchoscope was then introduced to the left upper lobe and washings  were taken from that area.  The procedure was completed and all samples were submitted for appropriate studies  After adequate clearing of secretions was accomplished, the bronchoscope was removed from the patient and the procedure was ended.   The patient tolerated the procedure well and there were no complications.

## 2018-12-18 NOTE — PROGRESS NOTE ADULT - ASSESSMENT
58/M hx of DM & HTN & schizophrenia?  presented to ED with SOB with orthopnea. Became hypotensive in ED after IV NTG was given for elevated BP. Required intubation and pressor support. Admit to CCU for Acute respiratory failure from Septic Shock requiring intubation and pressor support. CXR concerning for LLL infiltrate - PNA? vs ARDS?    Acute on chronic hypercapnic respiratory failure on MV  Septic shock secondary to aspiration PNA  Pulmonary edema secondary to CHF  HO Schizophrenia  Smoker    [X] TLC  [X] Nichole  [X] intubated  [X] Sedated  [  ] Pressor    Problem List  Acute on chronic hypercapnic respiratory failure on MV  Septic shock secondary to aspiration PNA - off pressors   Pulmonary edema secondary to HFrEF  HO Schizophrenia  Smoker    - RVP negative  - UA - trace leuks; neg nitrates; Ur Cx neg  - Sputum Cx neg  - Bld cx -neg (12/9)  - CE 0.04>0.05>0.02  - rEEG: Abnormal due to the presence of: generalized slowing as above  - MRSA negative  - All cx negative  - CTH neg  - EEG - gen. slowing    # Septic Shock due to aspiration PNA & Acute on chronic hypercapneic respiratory failure  - Taken off sedation 12/17 momentarily, pt alert, follows commands appropriately; failed SBT   - Intubated & Sedated - FiO2 reduced  - Off pressors  - C/w meropenem (from 12/10)  - Prednisone PO daily tapering dose (12/10 was start)     # Pulmonary edema secondary to CHF   - TTE (12/10) - mod impaired function 30-35%, LV dilated, LV enlarged, mod MR, mild TR, LA dilation, small pericardial effusion, RV pressure 31  - Will need ACEI/ARB; hold for now as BP low  - Cardio following: c/w B-Blocker, dose adjusted  - IV lasix 40mg BiD for today    # Metabolic alkalosis + resp acidosis  - Check Urine Lytes, calc Ur Anion GAP    # New AF (CHADSVASC-3) now converted to sinus  - PO Amiodarone; PO B-blocker   - CTH negative  - LMWH for a/c    # Tmax 100.4 - pan cx    # No BM - c/w lactulose q8  # Hypernatremia - improved; d/c free water today, reassess daily franko as pt on IV lasix today.  # DM - insulin infusion  # Hx of HTN - hold BP meds  # Schizophrenia - on ariprazole  # GI ppx     HypoK+ - repleted today; repeat tonight    Full Code

## 2018-12-18 NOTE — PROGRESS NOTE ADULT - SUBJECTIVE AND OBJECTIVE BOX
SUBJECTIVE:    Patient is a 58y old Male who presents with a chief complaint of Septic Shock (18 Dec 2018 09:12)    Currently admitted to medicine with the primary diagnosis of Acute respiratory failure with hypoxia and hypercapnia     Today is hospital day 10d. This morning he is resting comfortably in bed and reports no new issues or overnight events.     PAST MEDICAL & SURGICAL HISTORY  ETOH abuse  HLD (hyperlipidemia)  Hypertension, unspecified type  COPD (chronic obstructive pulmonary disease)  Schizophrenia  Psych & behavrl factors assoc w disord or dis classd elswhr  Diabetes  No significant past surgical history    SOCIAL HISTORY:  Negative for smoking/alcohol/drug use.     ALLERGIES:  No Known Allergies    MEDICATIONS:  STANDING MEDICATIONS  amiodarone    Tablet 200 milliGRAM(s) Oral daily  ARIPiprazole 20 milliGRAM(s) Oral daily  chlorhexidine 0.12% Liquid 15 milliLiter(s) Oral Mucosa two times a day  chlorhexidine 4% Liquid 1 Application(s) Topical <User Schedule>  clotrimazole/betamethasone Cream 1 Application(s) Topical two times a day  dextrose 50% Injectable 50 milliLiter(s) IV Push every 15 minutes  dextrose 50% Injectable 25 milliLiter(s) IV Push every 15 minutes  docusate sodium Liquid 200 milliGRAM(s) Enteral Tube two times a day  enoxaparin Injectable 100 milliGRAM(s) SubCutaneous <User Schedule>  insulin Infusion 2 Unit(s)/Hr IV Continuous <Continuous>  lactulose Syrup 20 Gram(s) Oral every 8 hours  meropenem  IVPB      meropenem  IVPB 1000 milliGRAM(s) IV Intermittent every 8 hours  metoprolol tartrate 12.5 milliGRAM(s) Oral two times a day  norepinephrine Infusion 0.05 MICROgram(s)/kG/Min IV Continuous <Continuous>  pantoprazole   Suspension 40 milliGRAM(s) Oral daily  potassium chloride   Powder 40 milliEquivalent(s) Oral every 4 hours  predniSONE   Tablet 30 milliGRAM(s) Oral daily  propofol Infusion 10 MICROgram(s)/kG/Min IV Continuous <Continuous>    PRN MEDICATIONS  acetaminophen    Suspension .. 650 milliGRAM(s) Enteral Tube every 6 hours PRN  ALBUTerol   0.5% 2.5 milliGRAM(s) Nebulizer every 4 hours PRN    VITALS:   T(F): 99  HR: 56  BP: 116/64  RR: 22  SpO2: 99%    LABS:                        14.8   8.64  )-----------( 238      ( 18 Dec 2018 05:57 )             44.6     12-18    149<H>  |  98  |  35<H>  ----------------------------<  193<H>  3.4<L>   |  40<H>  |  0.6<L>    Ca    9.2      18 Dec 2018 05:57  Phos  3.4     12-18  Mg     2.4     12-18    TPro  6.5  /  Alb  3.4<L>  /  TBili  0.4  /  DBili  x   /  AST  39  /  ALT  50<H>  /  AlkPhos  81  12-18        ABG - ( 18 Dec 2018 05:18 )  pH, Arterial: 7.49  pH, Blood: x     /  pCO2: 58    /  pO2: 86    / HCO3: 43    / Base Excess: 16.2  /  SaO2: 96                        RADIOLOGY:    PHYSICAL EXAM:  GEN: intubated & sedated  LUNGS: Vent sounds detected b/l  HEART: S1/S2 present. RRR.   ABD: Soft, non-tender, non-distended. Bowel sounds present  EXT: no LE edema  NEURO: pupils react to light b/l & symmetric. sedated

## 2018-12-18 NOTE — PROGRESS NOTE ADULT - ASSESSMENT
Patient  sedated on vent. Echo EF 30 -35 %. On beta . HR acceptable. Possible ace or arb when stable.  Now in NSR. PO amiodarone . Lovenox for now. No further v-tach. Check cxr. Lasix prn. Hold again sedation today if able.  Prognosis guarded

## 2018-12-18 NOTE — PROGRESS NOTE ADULT - SUBJECTIVE AND OBJECTIVE BOX
Patient is a 58y old  Male who presents with a chief complaint of Septic Shock (18 Dec 2018 07:38)      Over Night Events:  Patient seen and examined still vented failed weaning trail yesterday has significant secretion       ROS:  See HPI    PHYSICAL EXAM    ICU Vital Signs Last 24 Hrs  T(C): 37.2 (18 Dec 2018 07:01), Max: 38.1 (17 Dec 2018 11:00)  T(F): 99 (18 Dec 2018 07:01), Max: 100.6 (17 Dec 2018 11:00)  HR: 56 (18 Dec 2018 08:26) (54 - 70)  BP: 116/64 (18 Dec 2018 07:13) (104/56 - 138/66)  BP(mean): 85 (18 Dec 2018 07:13) (75 - 96)  ABP: --  ABP(mean): --  RR: 22 (18 Dec 2018 07:13) (17 - 31)  SpO2: 99% (18 Dec 2018 08:26) (92% - 99%)      General: on sedation awake follow command   HEENT:             Et tube seven   Lymph Nodes: NO cervical LN   Lungs: Bibasilar crackles   Cardiovascular: Regular   Abdomen: Soft, Positive BS  Extremities: No clubbing   Skin: warm   Neurological: no focal deficit   Musculoskeletal: move all ext     I&O's Detail    17 Dec 2018 07:01  -  18 Dec 2018 07:00  --------------------------------------------------------  IN:    Enteral Tube Flush: 300 mL    insulin Infusion: 44 mL    propofol Infusion: 586 mL    Solution: 50 mL    Vital High Protein: 525 mL  Total IN: 1505 mL    OUT:    Indwelling Catheter - Urethral: 3950 mL  Total OUT: 3950 mL    Total NET: -2445 mL      18 Dec 2018 07:01  -  18 Dec 2018 09:13  --------------------------------------------------------  IN:  Total IN: 0 mL    OUT:    Indwelling Catheter - Urethral: 165 mL  Total OUT: 165 mL    Total NET: -165 mL          LABS:                          14.8   8.64  )-----------( 238      ( 18 Dec 2018 05:57 )             44.6         18 Dec 2018 05:57    149    |  98     |  35     ----------------------------<  193    3.4     |  40     |  0.6      Ca    9.2        18 Dec 2018 05:57  Phos  3.4       18 Dec 2018 05:57  Mg     2.4       18 Dec 2018 05:57    TPro  6.5    /  Alb  3.4    /  TBili  0.4    /  DBili  x      /  AST  39     /  ALT  50     /  AlkPhos  81     18 Dec 2018 05:57  Amylase x     lipase x                                                                                                                                                                                                 Mode: Auto Mode: PRVC/ Volume Support  RR (machine): 20  TV (machine): 450  FiO2: 45  PEEP: 8  MAP: 12  PIP: 18                                      ABG - ( 18 Dec 2018 05:18 )  pH, Arterial: 7.49  pH, Blood: x     /  pCO2: 58    /  pO2: 86    / HCO3: 43    / Base Excess: 16.2  /  SaO2: 96                  MEDICATIONS  (STANDING):  amiodarone    Tablet 200 milliGRAM(s) Oral daily  ARIPiprazole 20 milliGRAM(s) Oral daily  chlorhexidine 0.12% Liquid 15 milliLiter(s) Oral Mucosa two times a day  chlorhexidine 4% Liquid 1 Application(s) Topical <User Schedule>  clotrimazole/betamethasone Cream 1 Application(s) Topical two times a day  dextrose 50% Injectable 50 milliLiter(s) IV Push every 15 minutes  dextrose 50% Injectable 25 milliLiter(s) IV Push every 15 minutes  docusate sodium Liquid 200 milliGRAM(s) Enteral Tube two times a day  enoxaparin Injectable 100 milliGRAM(s) SubCutaneous <User Schedule>  insulin Infusion 2 Unit(s)/Hr (2 mL/Hr) IV Continuous <Continuous>  lactulose Syrup 20 Gram(s) Oral every 8 hours  meropenem  IVPB      meropenem  IVPB 1000 milliGRAM(s) IV Intermittent every 8 hours  metoprolol tartrate 12.5 milliGRAM(s) Oral two times a day  norepinephrine Infusion 0.05 MICROgram(s)/kG/Min (9.356 mL/Hr) IV Continuous <Continuous>  pantoprazole   Suspension 40 milliGRAM(s) Oral daily  potassium chloride   Powder 40 milliEquivalent(s) Oral every 4 hours  predniSONE   Tablet 30 milliGRAM(s) Oral daily  propofol Infusion 10 MICROgram(s)/kG/Min (5.802 mL/Hr) IV Continuous <Continuous>    MEDICATIONS  (PRN):  acetaminophen    Suspension .. 650 milliGRAM(s) Enteral Tube every 6 hours PRN Temp greater or equal to 38C (100.4F)  ALBUTerol   0.5% 2.5 milliGRAM(s) Nebulizer every 4 hours PRN Shortness of Breath and/or Wheezing          Xrays:  TLC:  OG:  ET tube:                                                                                    bibasilar opacity R >L    ECHO:

## 2018-12-19 LAB
ALBUMIN SERPL ELPH-MCNC: 3.5 G/DL — SIGNIFICANT CHANGE UP (ref 3.5–5.2)
ALP SERPL-CCNC: 77 U/L — SIGNIFICANT CHANGE UP (ref 30–115)
ALT FLD-CCNC: 46 U/L — HIGH (ref 0–41)
ANION GAP SERPL CALC-SCNC: 8 MMOL/L — SIGNIFICANT CHANGE UP (ref 7–14)
AST SERPL-CCNC: 26 U/L — SIGNIFICANT CHANGE UP (ref 0–41)
BASOPHILS # BLD AUTO: 0.04 K/UL — SIGNIFICANT CHANGE UP (ref 0–0.2)
BASOPHILS NFR BLD AUTO: 0.4 % — SIGNIFICANT CHANGE UP (ref 0–1)
BILIRUB SERPL-MCNC: 0.4 MG/DL — SIGNIFICANT CHANGE UP (ref 0.2–1.2)
BUN SERPL-MCNC: 38 MG/DL — HIGH (ref 10–20)
CALCIUM SERPL-MCNC: 8.9 MG/DL — SIGNIFICANT CHANGE UP (ref 8.5–10.1)
CHLORIDE SERPL-SCNC: 100 MMOL/L — SIGNIFICANT CHANGE UP (ref 98–110)
CO2 SERPL-SCNC: 38 MMOL/L — HIGH (ref 17–32)
CREAT SERPL-MCNC: 0.6 MG/DL — LOW (ref 0.7–1.5)
EOSINOPHIL # BLD AUTO: 0.28 K/UL — SIGNIFICANT CHANGE UP (ref 0–0.7)
EOSINOPHIL NFR BLD AUTO: 2.6 % — SIGNIFICANT CHANGE UP (ref 0–8)
GLUCOSE BLDC GLUCOMTR-MCNC: 105 MG/DL — HIGH (ref 70–99)
GLUCOSE BLDC GLUCOMTR-MCNC: 114 MG/DL — HIGH (ref 70–99)
GLUCOSE BLDC GLUCOMTR-MCNC: 120 MG/DL — HIGH (ref 70–99)
GLUCOSE BLDC GLUCOMTR-MCNC: 136 MG/DL — HIGH (ref 70–99)
GLUCOSE BLDC GLUCOMTR-MCNC: 156 MG/DL — HIGH (ref 70–99)
GLUCOSE BLDC GLUCOMTR-MCNC: 158 MG/DL — HIGH (ref 70–99)
GLUCOSE BLDC GLUCOMTR-MCNC: 159 MG/DL — HIGH (ref 70–99)
GLUCOSE BLDC GLUCOMTR-MCNC: 172 MG/DL — HIGH (ref 70–99)
GLUCOSE BLDC GLUCOMTR-MCNC: 198 MG/DL — HIGH (ref 70–99)
GLUCOSE BLDC GLUCOMTR-MCNC: 248 MG/DL — HIGH (ref 70–99)
GLUCOSE BLDC GLUCOMTR-MCNC: 255 MG/DL — HIGH (ref 70–99)
GLUCOSE BLDC GLUCOMTR-MCNC: 95 MG/DL — SIGNIFICANT CHANGE UP (ref 70–99)
GLUCOSE SERPL-MCNC: 156 MG/DL — HIGH (ref 70–99)
HCT VFR BLD CALC: 44.3 % — SIGNIFICANT CHANGE UP (ref 42–52)
HGB BLD-MCNC: 14.4 G/DL — SIGNIFICANT CHANGE UP (ref 14–18)
IMM GRANULOCYTES NFR BLD AUTO: 0.5 % — HIGH (ref 0.1–0.3)
LYMPHOCYTES # BLD AUTO: 1.26 K/UL — SIGNIFICANT CHANGE UP (ref 1.2–3.4)
LYMPHOCYTES # BLD AUTO: 11.9 % — LOW (ref 20.5–51.1)
MAGNESIUM SERPL-MCNC: 2.4 MG/DL — SIGNIFICANT CHANGE UP (ref 1.8–2.4)
MCHC RBC-ENTMCNC: 30.1 PG — SIGNIFICANT CHANGE UP (ref 27–31)
MCHC RBC-ENTMCNC: 32.5 G/DL — SIGNIFICANT CHANGE UP (ref 32–37)
MCV RBC AUTO: 92.5 FL — SIGNIFICANT CHANGE UP (ref 80–94)
MONOCYTES # BLD AUTO: 0.72 K/UL — HIGH (ref 0.1–0.6)
MONOCYTES NFR BLD AUTO: 6.8 % — SIGNIFICANT CHANGE UP (ref 1.7–9.3)
NEUTROPHILS # BLD AUTO: 8.23 K/UL — HIGH (ref 1.4–6.5)
NEUTROPHILS NFR BLD AUTO: 77.8 % — HIGH (ref 42.2–75.2)
NIGHT BLUE STAIN TISS: SIGNIFICANT CHANGE UP
NIGHT BLUE STAIN TISS: SIGNIFICANT CHANGE UP
PHOSPHATE SERPL-MCNC: 3.1 MG/DL — SIGNIFICANT CHANGE UP (ref 2.1–4.9)
PLATELET # BLD AUTO: 240 K/UL — SIGNIFICANT CHANGE UP (ref 130–400)
POTASSIUM SERPL-MCNC: 3.7 MMOL/L — SIGNIFICANT CHANGE UP (ref 3.5–5)
POTASSIUM SERPL-SCNC: 3.7 MMOL/L — SIGNIFICANT CHANGE UP (ref 3.5–5)
PROT SERPL-MCNC: 6.1 G/DL — SIGNIFICANT CHANGE UP (ref 6–8)
RBC # BLD: 4.79 M/UL — SIGNIFICANT CHANGE UP (ref 4.7–6.1)
RBC # FLD: 13.3 % — SIGNIFICANT CHANGE UP (ref 11.5–14.5)
SODIUM SERPL-SCNC: 146 MMOL/L — SIGNIFICANT CHANGE UP (ref 135–146)
SPECIMEN SOURCE: SIGNIFICANT CHANGE UP
SPECIMEN SOURCE: SIGNIFICANT CHANGE UP
WBC # BLD: 10.58 K/UL — SIGNIFICANT CHANGE UP (ref 4.8–10.8)
WBC # FLD AUTO: 10.58 K/UL — SIGNIFICANT CHANGE UP (ref 4.8–10.8)

## 2018-12-19 RX ORDER — FUROSEMIDE 40 MG
20 TABLET ORAL ONCE
Qty: 0 | Refills: 0 | Status: COMPLETED | OUTPATIENT
Start: 2018-12-19 | End: 2018-12-19

## 2018-12-19 RX ADMIN — Medication 1 APPLICATION(S): at 06:05

## 2018-12-19 RX ADMIN — Medication 1 APPLICATION(S): at 17:40

## 2018-12-19 RX ADMIN — CHLORHEXIDINE GLUCONATE 15 MILLILITER(S): 213 SOLUTION TOPICAL at 06:05

## 2018-12-19 RX ADMIN — Medication 30 MILLIGRAM(S): at 06:03

## 2018-12-19 RX ADMIN — Medication 20 MILLIGRAM(S): at 09:53

## 2018-12-19 RX ADMIN — CHLORHEXIDINE GLUCONATE 15 MILLILITER(S): 213 SOLUTION TOPICAL at 17:39

## 2018-12-19 RX ADMIN — Medication 200 MILLIGRAM(S): at 06:02

## 2018-12-19 RX ADMIN — Medication 1 APPLICATION(S): at 06:04

## 2018-12-19 RX ADMIN — Medication 12.5 MILLIGRAM(S): at 06:03

## 2018-12-19 RX ADMIN — PROPOFOL 5.8 MICROGRAM(S)/KG/MIN: 10 INJECTION, EMULSION INTRAVENOUS at 08:00

## 2018-12-19 RX ADMIN — MEROPENEM 100 MILLIGRAM(S): 1 INJECTION INTRAVENOUS at 15:28

## 2018-12-19 RX ADMIN — MEROPENEM 100 MILLIGRAM(S): 1 INJECTION INTRAVENOUS at 21:21

## 2018-12-19 RX ADMIN — ARIPIPRAZOLE 20 MILLIGRAM(S): 15 TABLET ORAL at 11:36

## 2018-12-19 RX ADMIN — CHLORHEXIDINE GLUCONATE 1 APPLICATION(S): 213 SOLUTION TOPICAL at 06:05

## 2018-12-19 RX ADMIN — ENOXAPARIN SODIUM 100 MILLIGRAM(S): 100 INJECTION SUBCUTANEOUS at 06:04

## 2018-12-19 RX ADMIN — INSULIN HUMAN 2 UNIT(S)/HR: 100 INJECTION, SOLUTION SUBCUTANEOUS at 08:00

## 2018-12-19 RX ADMIN — MEROPENEM 100 MILLIGRAM(S): 1 INJECTION INTRAVENOUS at 06:02

## 2018-12-19 RX ADMIN — PANTOPRAZOLE SODIUM 40 MILLIGRAM(S): 20 TABLET, DELAYED RELEASE ORAL at 11:36

## 2018-12-19 RX ADMIN — ENOXAPARIN SODIUM 100 MILLIGRAM(S): 100 INJECTION SUBCUTANEOUS at 17:40

## 2018-12-19 RX ADMIN — AMIODARONE HYDROCHLORIDE 200 MILLIGRAM(S): 400 TABLET ORAL at 06:03

## 2018-12-19 RX ADMIN — LACTULOSE 20 GRAM(S): 10 SOLUTION ORAL at 06:03

## 2018-12-19 NOTE — SWALLOW BEDSIDE ASSESSMENT ADULT - ASR SWALLOW ASPIRATION MONITOR
gurgly voice/upper respiratory infection/cough/oral hygiene/fever/pneumonia/throat clearing/change of breathing pattern/position upright (89Y)

## 2018-12-19 NOTE — PROGRESS NOTE ADULT - ASSESSMENT
IMPRESSION:  Acute on chronic hypercapnic respiratory failure on MV  Septic shock secondary to aspiration PNA  Pulmonary edema secondary to HfrEF  HO Schizophrenia  Smoker  hypernatremia    PLAN:    CNS: Daily SAT;     HEENT: Oral care    PULMONARY: HOB at 45 degrees; weaning trial     CARDIOVASCULAR: Keep I<O; FU with cardio need cardiac cath   give 20 mg lasix now   GI: GI prophylaxis.  Feeding as tolerated; free WATER VIA NG     RENAL:  FU lytes; free water FOLLOW BMP , NA     INFECTIOUS DISEASE: Panculture; c/w rex    HEMATOLOGICAL:  DVT prophylaxis lovenox therapeutic     ENDOCRINE:  Follow up FS.  Insulin protocol if needed.    MICU monitoring

## 2018-12-19 NOTE — PROGRESS NOTE ADULT - SUBJECTIVE AND OBJECTIVE BOX
SUBJECTIVE:    Patient is a 58y old Male who presents with a chief complaint of Septic Shock (19 Dec 2018 09:09)    Currently admitted to medicine with the primary diagnosis of Acute respiratory failure with hypoxia and hypercapnia     Today is hospital day 11d. This morning he is resting comfortably in bed and reports no new issues or overnight events.     PAST MEDICAL & SURGICAL HISTORY  ETOH abuse  HLD (hyperlipidemia)  Hypertension, unspecified type  COPD (chronic obstructive pulmonary disease)  Schizophrenia  Psych & behavrl factors assoc w disord or dis classd elswhr  Diabetes  No significant past surgical history    SOCIAL HISTORY:  Negative for smoking/alcohol/drug use.     ALLERGIES:  No Known Allergies    MEDICATIONS:  STANDING MEDICATIONS  amiodarone    Tablet 200 milliGRAM(s) Oral daily  ARIPiprazole 20 milliGRAM(s) Oral daily  betamethasone valerate 0.1% Cream 1 Application(s) Topical two times a day  chlorhexidine 0.12% Liquid 15 milliLiter(s) Oral Mucosa two times a day  chlorhexidine 4% Liquid 1 Application(s) Topical <User Schedule>  clotrimazole 1% Cream 1 Application(s) Topical two times a day  dextrose 50% Injectable 50 milliLiter(s) IV Push every 15 minutes  dextrose 50% Injectable 25 milliLiter(s) IV Push every 15 minutes  docusate sodium Liquid 200 milliGRAM(s) Enteral Tube two times a day  enoxaparin Injectable 100 milliGRAM(s) SubCutaneous <User Schedule>  insulin Infusion 2 Unit(s)/Hr IV Continuous <Continuous>  lactulose Syrup 20 Gram(s) Oral every 8 hours  meropenem  IVPB      meropenem  IVPB 1000 milliGRAM(s) IV Intermittent every 8 hours  metoprolol tartrate 12.5 milliGRAM(s) Oral two times a day  norepinephrine Infusion 0.05 MICROgram(s)/kG/Min IV Continuous <Continuous>  pantoprazole   Suspension 40 milliGRAM(s) Oral daily  predniSONE   Tablet 30 milliGRAM(s) Oral daily  propofol Infusion 10 MICROgram(s)/kG/Min IV Continuous <Continuous>    PRN MEDICATIONS  acetaminophen    Suspension .. 650 milliGRAM(s) Enteral Tube every 6 hours PRN  ALBUTerol   0.5% 2.5 milliGRAM(s) Nebulizer every 4 hours PRN    VITALS:   T(F): 99.4  HR: 68  BP: 118/64  RR: 21  SpO2: 99%    LABS:                        14.4   10.58 )-----------( 240      ( 19 Dec 2018 05:46 )             44.3     12-19    146  |  100  |  38<H>  ----------------------------<  156<H>  3.7   |  38<H>  |  0.6<L>    Ca    8.9      19 Dec 2018 05:46  Phos  3.1     12-19  Mg     2.4     12-19    TPro  6.1  /  Alb  3.5  /  TBili  0.4  /  DBili  x   /  AST  26  /  ALT  46<H>  /  AlkPhos  77  12-19        ABG - ( 18 Dec 2018 17:15 )  pH, Arterial: 7.55  pH, Blood: x     /  pCO2: 46    /  pO2: 87    / HCO3: 41    / Base Excess: 16.8  /  SaO2: 94                    Culture - Fungal, Bronchial (collected 18 Dec 2018 14:12)  Source: .Broncial Bronchoalveolar Washings  Preliminary Report (19 Dec 2018 09:08):    Testing in progress    Culture - Fungal, Bronchial (collected 18 Dec 2018 14:12)  Source: .Broncial Bronchoalveolar Lavage  Preliminary Report (19 Dec 2018 09:08):    Testing in progress    Culture - Bronchial (collected 18 Dec 2018 14:12)  Source: Bronch Wash Bronchoalveolar Washings  Gram Stain (18 Dec 2018 23:56):    Rare Squamous epithelial cells per low power field    Moderate polymorphonuclear leukocytes per low power field    No organisms seen per oil power field    Culture - Bronchial (collected 18 Dec 2018 14:12)  Source: Bronch Wash Bronchoalveolar Lavage  Gram Stain (18 Dec 2018 23:53):    Few polymorphonuclear leukocytes per low power field    Rare Squamous epithelial cells per low power field    No organisms seen per oil power field    Culture - Blood (collected 17 Dec 2018 11:36)  Source: .Blood None  Preliminary Report (19 Dec 2018 01:02):    No growth to date.          RADIOLOGY:    PHYSICAL EXAM:  GEN: No acute distress, intubated, follows commands  LUNGS: Clear to auscultation bilaterally   HEART: S1/S2 present. RRR.   ABD: Soft, non-tender, non-distended. Bowel sounds present  EXT: no LE edmea, moves distal ext (hands & feet)  NEURO: follows commands

## 2018-12-19 NOTE — PROGRESS NOTE ADULT - SUBJECTIVE AND OBJECTIVE BOX
Patient is a 58y old  Male who presents with a chief complaint of Septic Shock (19 Dec 2018 07:25)      T(F): 99.4 (12-19-18 @ 07:01), Max: 100.7 (12-19-18 @ 03:10)  HR: 67 (12-19-18 @ 05:00)  BP: 143/70 (12-19-18 @ 05:00)  RR: 21 (12-19-18 @ 07:01)  SpO2: 99% (12-19-18 @ 05:10) (95% - 100%)    PHYSICAL EXAM:  GENERAL: NAD, well-groomed, well-developed  HEAD:  Atraumatic, Normocephalic  EYES: EOMI, PERRLA, conjunctiva and sclera clear  ENMT: No tonsillar erythema, exudates, or enlargement; Moist mucous membranes, Good dentition, No lesions  NECK: Supple, No JVD, Normal thyroid  NERVOUS SYSTEM:  Alert & Oriented X3,  Motor Strength 5/5 B/L upper and lower extremities  CHEST/LUNG: Clear to percussion bilaterally; No rales, rhonchi, wheezing, or rubs. Decreased BS  HEART: Regular rate and rhythm; No murmurs, rubs, or gallops  ABDOMEN: Soft, Nontender, Nondistended; Bowel sounds present  EXTREMITIES:   No clubbing, cyanosis, or edema  LYMPH: No lymphadenopathy noted  SKIN: No rashes or lesions    labs  12-19    146  |  100  |  38<H>  ----------------------------<  156<H>  3.7   |  38<H>  |  0.6<L>    Ca    8.9      19 Dec 2018 05:46  Phos  3.1     12-19  Mg     2.4     12-19    TPro  6.1  /  Alb  3.5  /  TBili  0.4  /  DBili  x   /  AST  26  /  ALT  46<H>  /  AlkPhos  77  12-19                          14.4   10.58 )-----------( 240      ( 19 Dec 2018 05:46 )             44.3       Culture - Bronchial (collected 18 Dec 2018 14:12)  Source: Bronch Wash Bronchoalveolar Washings  Gram Stain (18 Dec 2018 23:56):    Rare Squamous epithelial cells per low power field    Moderate polymorphonuclear leukocytes per low power field    No organisms seen per oil power field    Culture - Bronchial (collected 18 Dec 2018 14:12)  Source: Bronch Wash Bronchoalveolar Lavage  Gram Stain (18 Dec 2018 23:53):    Few polymorphonuclear leukocytes per low power field    Rare Squamous epithelial cells per low power field    No organisms seen per oil power field    Culture - Blood (collected 17 Dec 2018 11:36)  Source: .Blood None  Preliminary Report (19 Dec 2018 01:02):    No growth to date.        Mode: Auto Mode: PRVC/ Volume Support  RR (machine): 20  TV (machine): 450  FiO2: 45  PEEP: 8  MAP: 12  PIP: 19        acetaminophen    Suspension .. 650 milliGRAM(s) Enteral Tube every 6 hours PRN  ALBUTerol   0.5% 2.5 milliGRAM(s) Nebulizer every 4 hours PRN  amiodarone    Tablet 200 milliGRAM(s) Oral daily  ARIPiprazole 20 milliGRAM(s) Oral daily  betamethasone valerate 0.1% Cream 1 Application(s) Topical two times a day  chlorhexidine 0.12% Liquid 15 milliLiter(s) Oral Mucosa two times a day  chlorhexidine 4% Liquid 1 Application(s) Topical <User Schedule>  clotrimazole 1% Cream 1 Application(s) Topical two times a day  dextrose 50% Injectable 50 milliLiter(s) IV Push every 15 minutes  dextrose 50% Injectable 25 milliLiter(s) IV Push every 15 minutes  docusate sodium Liquid 200 milliGRAM(s) Enteral Tube two times a day  enoxaparin Injectable 100 milliGRAM(s) SubCutaneous <User Schedule>  insulin Infusion 2 Unit(s)/Hr IV Continuous <Continuous>  lactulose Syrup 20 Gram(s) Oral every 8 hours  meropenem  IVPB      meropenem  IVPB 1000 milliGRAM(s) IV Intermittent every 8 hours  metoprolol tartrate 12.5 milliGRAM(s) Oral two times a day  norepinephrine Infusion 0.05 MICROgram(s)/kG/Min IV Continuous <Continuous>  pantoprazole   Suspension 40 milliGRAM(s) Oral daily  predniSONE   Tablet 30 milliGRAM(s) Oral daily  propofol Infusion 10 MICROgram(s)/kG/Min IV Continuous <Continuous>

## 2018-12-19 NOTE — SWALLOW BEDSIDE ASSESSMENT ADULT - PHARYNGEAL PHASE
+ toleration observed without overt symptoms of penetration/aspiration on thin liquids via tsp, open cup single sips, open cup sequential sips + toleration observed without overt symptoms of penetration/aspiration Wet vocal quality post oral intake/Complaints of pharyngeal stasis/cleared with cued throat clear.

## 2018-12-19 NOTE — PROGRESS NOTE ADULT - ASSESSMENT
58/M hx of DM & HTN & schizophrenia?  presented to ED with SOB with orthopnea. Became hypotensive in ED after IV NTG was given for elevated BP. Required intubation and pressor support. Admit to CCU for Acute respiratory failure from Septic Shock requiring intubation and pressor support. CXR concerning for LLL infiltrate - PNA? vs ARDS?    Acute on chronic hypercapnic respiratory failure on MV  Septic shock secondary to aspiration PNA  Pulmonary edema secondary to CHF  HO Schizophrenia  Smoker    [X] TLC  [X] Nichole  [X] intubated  [X] Sedated  [  ] Pressor    Problem List  Acute on chronic hypercapnic respiratory failure on MV  Septic shock secondary to aspiration PNA - off pressors   Pulmonary edema secondary to HFrEF  HO Schizophrenia  Smoker    - RVP negative  - UA - trace leuks; neg nitrates; Ur Cx neg  - Sputum Cx neg  - Bld cx -neg (12/9)  - CE 0.04>0.05>0.02  - rEEG: Abnormal due to the presence of: generalized slowing as above  - MRSA negative  - All cx negative  - CTH neg  - EEG - gen. slowing  - No org on Bronch gram stain    # Septic Shock due to aspiration PNA & Acute on chronic hypercapneic respiratory failure  - Intubated & Sedated - FiO2 reduced  - Off pressors  - C/w meropenem (from 12/10)  - Prednisone PO daily tapering dose (12/10 was start)   - Bronchoscopy (12/18)- All bronchi were patent with no endobronchial lesions and no mucosal lesions noted.  The Left tracheobronchial tree was revealed thin secretions at the left upper and lower lobe segments;     # Pulmonary edema secondary to CHF   - TTE (12/10) - mod impaired function 30-35%, LV dilated, LV enlarged, mod MR, mild TR, LA dilation, small pericardial effusion, RV pressure 31  - Will need ACEI/ARB; hold for now as BP low  - Cardio following: c/w B-Blocker, dose adjusted  - Hold lasix today    # Metabolic alkalosis + resp acidosis  - Low urine Cl- ; likely due to lasix  - No IVF, will hold lasix    # New AF (CHADSVASC-3) now converted to sinus  - PO Amiodarone; PO B-blocker   - CTH negative  - LMWH for a/c    # Tmax 100.6 - f/u cx    # No BM - c/w lactulose q8  # Hypernatremia - FWD 3.3L; start free water 250cc Q4H    # DM - insulin infusion  # Hx of HTN - hold BP meds; pt on sedation; BP acceptable  # Schizophrenia - on ariprazole  # GI ppx       Full Code 58/M hx of DM & HTN & schizophrenia?  presented to ED with SOB with orthopnea. Became hypotensive in ED after IV NTG was given for elevated BP. Required intubation and pressor support. Admit to CCU for Acute respiratory failure from Septic Shock requiring intubation and pressor support. CXR concerning for LLL infiltrate - PNA? vs ARDS?    Acute on chronic hypercapnic respiratory failure on MV  Septic shock secondary to aspiration PNA  Pulmonary edema secondary to CHF  HO Schizophrenia  Smoker    [  ] TLC  [X] Nichole  [X] intubated  [ ] Sedated  [  ] Pressor    Problem List  Acute on chronic hypercapnic respiratory failure on MV  Septic shock secondary to aspiration PNA - off pressors   Pulmonary edema secondary to HFrEF  HO Schizophrenia  Smoker    - RVP negative  - UA - trace leuks; neg nitrates; Ur Cx neg  - Sputum Cx neg  - Bld cx -neg (12/9)  - CE 0.04>0.05>0.02  - rEEG: Abnormal due to the presence of: generalized slowing as above  - MRSA negative  - All cx negative  - CTH neg  - EEG - gen. slowing  - Bronchoscopy (12/18)- All bronchi were patent with no endobronchial lesions and no mucosal lesions noted.  The Left tracheobronchial tree was revealed thin secretions at the left upper and lower lobe segments;   - No org on Bronch gram stain    # Septic Shock due to aspiration PNA & Acute on chronic hypercapneic respiratory failure - improved, off pressors  - Intubated; follows commands off sedation  - C/w meropenem (from 12/10)  - Prednisone PO daily tapering dose (12/10 was start)   - Daily SBT    # Pulmonary edema secondary to CHF   - TTE (12/10) - mod impaired function 30-35%, LV dilated, LV enlarged, mod MR, mild TR, LA dilation, small pericardial effusion, RV pressure 31  - Will need ACEI/ARB; hold for now as BP low  - Cardio following: c/w B-Blocker, dose adjusted  - IV lasix today - poss extubate?    # Metabolic alkalosis + resp acidosis  - Low urine Cl- ; likely due to lasix    # New AF (CHADSVASC-3) now converted to sinus  - PO Amiodarone; PO B-blocker   - CTH negative  - LMWH for a/c    # Hypernatremia - FWD 3.3L; free water 250cc Q4H    # DM - insulin infusion  # Hx of HTN - hold BP meds; BP acceptable  # Schizophrenia - on ariprazole  # GI ppx       Full Code

## 2018-12-19 NOTE — PROGRESS NOTE ADULT - ASSESSMENT
Patient  sedated on vent. Echo EF 30 -35 %. On beta . HR acceptable. Possible ace or arb when stable.  Now in NSR. PO amiodarone . Lovenox for now. No further v-tach.  Lasix prn. Hold again sedation today if able.  Prognosis guarded

## 2018-12-19 NOTE — PROGRESS NOTE ADULT - SUBJECTIVE AND OBJECTIVE BOX
Patient is a 58y old  Male who presents with a chief complaint of Septic Shock (19 Dec 2018 07:40)      Over Night Events:  Patient seen and examined. more awake s/p bronch yesterday       ROS:  See HPI    PHYSICAL EXAM    ICU Vital Signs Last 24 Hrs  T(C): 37.4 (19 Dec 2018 07:01), Max: 38.2 (19 Dec 2018 03:10)  T(F): 99.4 (19 Dec 2018 07:01), Max: 100.7 (19 Dec 2018 03:10)  HR: 65 (19 Dec 2018 07:00) (59 - 70)  BP: 118/64 (19 Dec 2018 07:00) (94/55 - 143/70)  BP(mean): 86 (19 Dec 2018 07:00) (68 - 100)  ABP: --  ABP(mean): --  RR: 21 (19 Dec 2018 07:01) (20 - 40)  SpO2: 98% (19 Dec 2018 07:00) (95% - 100%)      General:  HEENT:                Lymph Nodes: NO cervical LN   Lungs: Bilateral BS  Cardiovascular: Regular   Abdomen: Soft, Positive BS  Extremities: No clubbing   Skin:   Neurological:   Musculoskeletal: move all ext     I&O's Detail    18 Dec 2018 07:01  -  19 Dec 2018 07:00  --------------------------------------------------------  IN:    Enteral Tube Flush: 1140 mL    Free Water: 500 mL    insulin Infusion: 48 mL    IV PiggyBack: 50 mL    propofol Infusion: 720 mL    Solution: 100 mL    Vital High Protein: 525 mL  Total IN: 3083 mL    OUT:    Indwelling Catheter - Urethral: 1045 mL  Total OUT: 1045 mL    Total NET: 2038 mL      19 Dec 2018 07:01  -  19 Dec 2018 09:10  --------------------------------------------------------  IN:  Total IN: 0 mL    OUT:    Indwelling Catheter - Urethral: 75 mL  Total OUT: 75 mL    Total NET: -75 mL          LABS:                          14.4   10.58 )-----------( 240      ( 19 Dec 2018 05:46 )             44.3         19 Dec 2018 05:46    146    |  100    |  38     ----------------------------<  156    3.7     |  38     |  0.6      Ca    8.9        19 Dec 2018 05:46  Phos  3.1       19 Dec 2018 05:46  Mg     2.4       19 Dec 2018 05:46    TPro  6.1    /  Alb  3.5    /  TBili  0.4    /  DBili  x      /  AST  26     /  ALT  46     /  AlkPhos  77     19 Dec 2018 05:46  Amylase x     lipase x                                                                                                                                                    Culture - Fungal, Bronchial (collected 18 Dec 2018 14:12)  Source: .Broncial Bronchoalveolar Washings  Preliminary Report (19 Dec 2018 09:08):    Testing in progress    Culture - Fungal, Bronchial (collected 18 Dec 2018 14:12)  Source: .Broncial Bronchoalveolar Lavage  Preliminary Report (19 Dec 2018 09:08):    Testing in progress    Culture - Bronchial (collected 18 Dec 2018 14:12)  Source: Bronch Wash Bronchoalveolar Washings  Gram Stain (18 Dec 2018 23:56):    Rare Squamous epithelial cells per low power field    Moderate polymorphonuclear leukocytes per low power field    No organisms seen per oil power field    Culture - Bronchial (collected 18 Dec 2018 14:12)  Source: Bronch Wash Bronchoalveolar Lavage  Gram Stain (18 Dec 2018 23:53):    Few polymorphonuclear leukocytes per low power field    Rare Squamous epithelial cells per low power field    No organisms seen per oil power field    Culture - Blood (collected 17 Dec 2018 11:36)  Source: .Blood None  Preliminary Report (19 Dec 2018 01:02):    No growth to date.                                                   Mode: Auto Mode: PRVC/ Volume Support  RR (machine): 20  TV (machine): 450  FiO2: 45  PEEP: 8  MAP: 12  PIP: 19                                      ABG - ( 18 Dec 2018 17:15 )  pH, Arterial: 7.55  pH, Blood: x     /  pCO2: 46    /  pO2: 87    / HCO3: 41    / Base Excess: 16.8  /  SaO2: 94        7.46/60/93/            MEDICATIONS  (STANDING):  amiodarone    Tablet 200 milliGRAM(s) Oral daily  ARIPiprazole 20 milliGRAM(s) Oral daily  betamethasone valerate 0.1% Cream 1 Application(s) Topical two times a day  chlorhexidine 0.12% Liquid 15 milliLiter(s) Oral Mucosa two times a day  chlorhexidine 4% Liquid 1 Application(s) Topical <User Schedule>  clotrimazole 1% Cream 1 Application(s) Topical two times a day  dextrose 50% Injectable 50 milliLiter(s) IV Push every 15 minutes  dextrose 50% Injectable 25 milliLiter(s) IV Push every 15 minutes  docusate sodium Liquid 200 milliGRAM(s) Enteral Tube two times a day  enoxaparin Injectable 100 milliGRAM(s) SubCutaneous <User Schedule>  insulin Infusion 2 Unit(s)/Hr (2 mL/Hr) IV Continuous <Continuous>  lactulose Syrup 20 Gram(s) Oral every 8 hours  meropenem  IVPB      meropenem  IVPB 1000 milliGRAM(s) IV Intermittent every 8 hours  metoprolol tartrate 12.5 milliGRAM(s) Oral two times a day  norepinephrine Infusion 0.05 MICROgram(s)/kG/Min (9.356 mL/Hr) IV Continuous <Continuous>  pantoprazole   Suspension 40 milliGRAM(s) Oral daily  predniSONE   Tablet 30 milliGRAM(s) Oral daily  propofol Infusion 10 MICROgram(s)/kG/Min (5.802 mL/Hr) IV Continuous <Continuous>    MEDICATIONS  (PRN):  acetaminophen    Suspension .. 650 milliGRAM(s) Enteral Tube every 6 hours PRN Temp greater or equal to 38C (100.4F)  ALBUTerol   0.5% 2.5 milliGRAM(s) Nebulizer every 4 hours PRN Shortness of Breath and/or Wheezing          Xrays:  TLC:  OG:  ET tube:                                                                                    b/l opacity small effusion    ECHO:

## 2018-12-20 LAB
ALBUMIN SERPL ELPH-MCNC: 3.5 G/DL — SIGNIFICANT CHANGE UP (ref 3.5–5.2)
ALP SERPL-CCNC: 89 U/L — SIGNIFICANT CHANGE UP (ref 30–115)
ALT FLD-CCNC: 40 U/L — SIGNIFICANT CHANGE UP (ref 0–41)
ANION GAP SERPL CALC-SCNC: 10 MMOL/L — SIGNIFICANT CHANGE UP (ref 7–14)
AST SERPL-CCNC: 29 U/L — SIGNIFICANT CHANGE UP (ref 0–41)
BASOPHILS # BLD AUTO: 0.03 K/UL — SIGNIFICANT CHANGE UP (ref 0–0.2)
BASOPHILS NFR BLD AUTO: 0.3 % — SIGNIFICANT CHANGE UP (ref 0–1)
BILIRUB SERPL-MCNC: 0.9 MG/DL — SIGNIFICANT CHANGE UP (ref 0.2–1.2)
BUN SERPL-MCNC: 28 MG/DL — HIGH (ref 10–20)
CALCIUM SERPL-MCNC: 9.2 MG/DL — SIGNIFICANT CHANGE UP (ref 8.5–10.1)
CHLORIDE SERPL-SCNC: 100 MMOL/L — SIGNIFICANT CHANGE UP (ref 98–110)
CO2 SERPL-SCNC: 36 MMOL/L — HIGH (ref 17–32)
CREAT SERPL-MCNC: 0.5 MG/DL — LOW (ref 0.7–1.5)
CULTURE RESULTS: SIGNIFICANT CHANGE UP
CULTURE RESULTS: SIGNIFICANT CHANGE UP
EOSINOPHIL # BLD AUTO: 0.31 K/UL — SIGNIFICANT CHANGE UP (ref 0–0.7)
EOSINOPHIL NFR BLD AUTO: 3 % — SIGNIFICANT CHANGE UP (ref 0–8)
GLUCOSE BLDC GLUCOMTR-MCNC: 123 MG/DL — HIGH (ref 70–99)
GLUCOSE BLDC GLUCOMTR-MCNC: 133 MG/DL — HIGH (ref 70–99)
GLUCOSE BLDC GLUCOMTR-MCNC: 136 MG/DL — HIGH (ref 70–99)
GLUCOSE BLDC GLUCOMTR-MCNC: 228 MG/DL — HIGH (ref 70–99)
GLUCOSE BLDC GLUCOMTR-MCNC: 293 MG/DL — HIGH (ref 70–99)
GLUCOSE SERPL-MCNC: 153 MG/DL — HIGH (ref 70–99)
HCT VFR BLD CALC: 46.2 % — SIGNIFICANT CHANGE UP (ref 42–52)
HGB BLD-MCNC: 15.5 G/DL — SIGNIFICANT CHANGE UP (ref 14–18)
IMM GRANULOCYTES NFR BLD AUTO: 0.5 % — HIGH (ref 0.1–0.3)
LYMPHOCYTES # BLD AUTO: 1.37 K/UL — SIGNIFICANT CHANGE UP (ref 1.2–3.4)
LYMPHOCYTES # BLD AUTO: 13.4 % — LOW (ref 20.5–51.1)
MAGNESIUM SERPL-MCNC: 2.2 MG/DL — SIGNIFICANT CHANGE UP (ref 1.8–2.4)
MCHC RBC-ENTMCNC: 30 PG — SIGNIFICANT CHANGE UP (ref 27–31)
MCHC RBC-ENTMCNC: 33.5 G/DL — SIGNIFICANT CHANGE UP (ref 32–37)
MCV RBC AUTO: 89.5 FL — SIGNIFICANT CHANGE UP (ref 80–94)
MONOCYTES # BLD AUTO: 0.67 K/UL — HIGH (ref 0.1–0.6)
MONOCYTES NFR BLD AUTO: 6.5 % — SIGNIFICANT CHANGE UP (ref 1.7–9.3)
NEUTROPHILS # BLD AUTO: 7.8 K/UL — HIGH (ref 1.4–6.5)
NEUTROPHILS NFR BLD AUTO: 76.3 % — HIGH (ref 42.2–75.2)
PHOSPHATE SERPL-MCNC: 2.2 MG/DL — SIGNIFICANT CHANGE UP (ref 2.1–4.9)
PLATELET # BLD AUTO: 240 K/UL — SIGNIFICANT CHANGE UP (ref 130–400)
POTASSIUM SERPL-MCNC: 3.8 MMOL/L — SIGNIFICANT CHANGE UP (ref 3.5–5)
POTASSIUM SERPL-SCNC: 3.8 MMOL/L — SIGNIFICANT CHANGE UP (ref 3.5–5)
PROT SERPL-MCNC: 6.4 G/DL — SIGNIFICANT CHANGE UP (ref 6–8)
RBC # BLD: 5.16 M/UL — SIGNIFICANT CHANGE UP (ref 4.7–6.1)
RBC # FLD: 12.7 % — SIGNIFICANT CHANGE UP (ref 11.5–14.5)
SODIUM SERPL-SCNC: 146 MMOL/L — SIGNIFICANT CHANGE UP (ref 135–146)
SPECIMEN SOURCE: SIGNIFICANT CHANGE UP
SPECIMEN SOURCE: SIGNIFICANT CHANGE UP
WBC # BLD: 10.23 K/UL — SIGNIFICANT CHANGE UP (ref 4.8–10.8)
WBC # FLD AUTO: 10.23 K/UL — SIGNIFICANT CHANGE UP (ref 4.8–10.8)

## 2018-12-20 RX ORDER — INSULIN LISPRO 100/ML
8 VIAL (ML) SUBCUTANEOUS
Qty: 0 | Refills: 0 | Status: DISCONTINUED | OUTPATIENT
Start: 2018-12-20 | End: 2018-12-22

## 2018-12-20 RX ORDER — PANTOPRAZOLE SODIUM 20 MG/1
40 TABLET, DELAYED RELEASE ORAL
Qty: 0 | Refills: 0 | Status: DISCONTINUED | OUTPATIENT
Start: 2018-12-20 | End: 2018-12-22

## 2018-12-20 RX ORDER — SODIUM CHLORIDE 9 MG/ML
1000 INJECTION, SOLUTION INTRAVENOUS
Qty: 0 | Refills: 0 | Status: DISCONTINUED | OUTPATIENT
Start: 2018-12-20 | End: 2018-12-21

## 2018-12-20 RX ORDER — DOCUSATE SODIUM 100 MG
100 CAPSULE ORAL
Qty: 0 | Refills: 0 | Status: DISCONTINUED | OUTPATIENT
Start: 2018-12-20 | End: 2018-12-22

## 2018-12-20 RX ORDER — FOLIC ACID 0.8 MG
1 TABLET ORAL DAILY
Qty: 0 | Refills: 0 | Status: DISCONTINUED | OUTPATIENT
Start: 2018-12-20 | End: 2018-12-22

## 2018-12-20 RX ORDER — LOSARTAN POTASSIUM 100 MG/1
50 TABLET, FILM COATED ORAL DAILY
Qty: 0 | Refills: 0 | Status: DISCONTINUED | OUTPATIENT
Start: 2018-12-20 | End: 2018-12-22

## 2018-12-20 RX ORDER — INSULIN LISPRO 100/ML
8 VIAL (ML) SUBCUTANEOUS
Qty: 0 | Refills: 0 | Status: DISCONTINUED | OUTPATIENT
Start: 2018-12-20 | End: 2018-12-20

## 2018-12-20 RX ORDER — INSULIN LISPRO 100/ML
1 VIAL (ML) SUBCUTANEOUS
Qty: 0 | Refills: 0 | Status: DISCONTINUED | OUTPATIENT
Start: 2018-12-20 | End: 2018-12-20

## 2018-12-20 RX ORDER — THIAMINE MONONITRATE (VIT B1) 100 MG
100 TABLET ORAL DAILY
Qty: 0 | Refills: 0 | Status: DISCONTINUED | OUTPATIENT
Start: 2018-12-20 | End: 2018-12-22

## 2018-12-20 RX ORDER — INSULIN GLARGINE 100 [IU]/ML
1 INJECTION, SOLUTION SUBCUTANEOUS AT BEDTIME
Qty: 0 | Refills: 0 | Status: DISCONTINUED | OUTPATIENT
Start: 2018-12-20 | End: 2018-12-20

## 2018-12-20 RX ORDER — INSULIN GLARGINE 100 [IU]/ML
24 INJECTION, SOLUTION SUBCUTANEOUS AT BEDTIME
Qty: 0 | Refills: 0 | Status: DISCONTINUED | OUTPATIENT
Start: 2018-12-20 | End: 2018-12-22

## 2018-12-20 RX ORDER — INSULIN LISPRO 100/ML
VIAL (ML) SUBCUTANEOUS
Qty: 0 | Refills: 0 | Status: DISCONTINUED | OUTPATIENT
Start: 2018-12-20 | End: 2018-12-20

## 2018-12-20 RX ORDER — ACETAMINOPHEN 500 MG
650 TABLET ORAL EVERY 6 HOURS
Qty: 0 | Refills: 0 | Status: DISCONTINUED | OUTPATIENT
Start: 2018-12-20 | End: 2018-12-22

## 2018-12-20 RX ORDER — INSULIN LISPRO 100/ML
VIAL (ML) SUBCUTANEOUS
Qty: 0 | Refills: 0 | Status: DISCONTINUED | OUTPATIENT
Start: 2018-12-20 | End: 2018-12-22

## 2018-12-20 RX ORDER — INSULIN GLARGINE 100 [IU]/ML
24 INJECTION, SOLUTION SUBCUTANEOUS AT BEDTIME
Qty: 0 | Refills: 0 | Status: DISCONTINUED | OUTPATIENT
Start: 2018-12-20 | End: 2018-12-20

## 2018-12-20 RX ADMIN — Medication 1 APPLICATION(S): at 17:14

## 2018-12-20 RX ADMIN — Medication 1 APPLICATION(S): at 06:16

## 2018-12-20 RX ADMIN — Medication 30 MILLIGRAM(S): at 06:26

## 2018-12-20 RX ADMIN — Medication 100 MILLIGRAM(S): at 11:23

## 2018-12-20 RX ADMIN — CHLORHEXIDINE GLUCONATE 1 APPLICATION(S): 213 SOLUTION TOPICAL at 11:12

## 2018-12-20 RX ADMIN — INSULIN HUMAN 2 UNIT(S)/HR: 100 INJECTION, SOLUTION SUBCUTANEOUS at 07:47

## 2018-12-20 RX ADMIN — Medication 12.5 MILLIGRAM(S): at 06:27

## 2018-12-20 RX ADMIN — PANTOPRAZOLE SODIUM 40 MILLIGRAM(S): 20 TABLET, DELAYED RELEASE ORAL at 13:46

## 2018-12-20 RX ADMIN — Medication 200 MILLIGRAM(S): at 06:26

## 2018-12-20 RX ADMIN — ARIPIPRAZOLE 20 MILLIGRAM(S): 15 TABLET ORAL at 11:14

## 2018-12-20 RX ADMIN — Medication 6: at 12:30

## 2018-12-20 RX ADMIN — MEROPENEM 100 MILLIGRAM(S): 1 INJECTION INTRAVENOUS at 06:27

## 2018-12-20 RX ADMIN — Medication 4: at 17:03

## 2018-12-20 RX ADMIN — Medication 1 MILLIGRAM(S): at 11:23

## 2018-12-20 RX ADMIN — AMIODARONE HYDROCHLORIDE 200 MILLIGRAM(S): 400 TABLET ORAL at 06:27

## 2018-12-20 RX ADMIN — Medication 100 MILLIGRAM(S): at 17:15

## 2018-12-20 RX ADMIN — LOSARTAN POTASSIUM 50 MILLIGRAM(S): 100 TABLET, FILM COATED ORAL at 11:17

## 2018-12-20 RX ADMIN — Medication 8 UNIT(S): at 17:03

## 2018-12-20 RX ADMIN — ENOXAPARIN SODIUM 100 MILLIGRAM(S): 100 INJECTION SUBCUTANEOUS at 18:46

## 2018-12-20 RX ADMIN — Medication 12.5 MILLIGRAM(S): at 17:06

## 2018-12-20 RX ADMIN — Medication 8 UNIT(S): at 13:33

## 2018-12-20 RX ADMIN — ENOXAPARIN SODIUM 100 MILLIGRAM(S): 100 INJECTION SUBCUTANEOUS at 06:26

## 2018-12-20 NOTE — PROGRESS NOTE ADULT - SUBJECTIVE AND OBJECTIVE BOX
Patient is a 58y old  Male who presents with a chief complaint of Septic Shock (20 Dec 2018 07:30)      T(F): 97.8 (12-20-18 @ 07:01), Max: 99.5 (12-19-18 @ 11:00)  HR: 64 (12-20-18 @ 07:00)  BP: 155/74 (12-20-18 @ 07:00)  RR: 20 (12-20-18 @ 07:01)  SpO2: 98% (12-20-18 @ 07:00) (87% - 99%)    PHYSICAL EXAM:  GENERAL: NAD, well-groomed, well-developed  HEAD:  Atraumatic, Normocephalic  EYES: EOMI, PERRLA, conjunctiva and sclera clear  ENMT: No tonsillar erythema, exudates, or enlargement; Moist mucous membranes, Good dentition, No lesions  NECK: Supple, No JVD, Normal thyroid  NERVOUS SYSTEM:  Alert & Oriented X3,  Motor Strength 5/5 B/L upper and lower extremities  CHEST/LUNG: Clear to percussion bilaterally; No rales, rhonchi, wheezing, or rubs  HEART: Regular rate and rhythm; No murmurs, rubs, or gallops  ABDOMEN: Soft, Nontender, Nondistended; Bowel sounds present  EXTREMITIES:   No clubbing, cyanosis, or edema  LYMPH: No lymphadenopathy noted  SKIN: No rashes or lesions    labs  12-20    146  |  100  |  28<H>  ----------------------------<  153<H>  3.8   |  36<H>  |  0.5<L>    Ca    9.2      20 Dec 2018 06:09  Phos  2.2     12-20  Mg     2.2     12-20    TPro  6.4  /  Alb  3.5  /  TBili  0.9  /  DBili  x   /  AST  29  /  ALT  40  /  AlkPhos  89  12-20                          15.5   10.23 )-----------( 240      ( 20 Dec 2018 06:09 )             46.2       Culture - Fungal, Bronchial (collected 18 Dec 2018 14:12)  Source: .Broncial Bronchoalveolar Washings  Preliminary Report (19 Dec 2018 09:08):    Testing in progress    Culture - Fungal, Bronchial (collected 18 Dec 2018 14:12)  Source: .Broncial Bronchoalveolar Lavage  Preliminary Report (20 Dec 2018 07:54):    Rare Yeast like cells    Culture - Bronchial (collected 18 Dec 2018 14:12)  Source: Bronch Wash Bronchoalveolar Washings  Gram Stain (18 Dec 2018 23:56):    Rare Squamous epithelial cells per low power field    Moderate polymorphonuclear leukocytes per low power field    No organisms seen per oil power field  Preliminary Report (19 Dec 2018 16:59):    No growth to date.    Culture - Bronchial (collected 18 Dec 2018 14:12)  Source: Bronch Wash Bronchoalveolar Lavage  Gram Stain (18 Dec 2018 23:53):    Few polymorphonuclear leukocytes per low power field    Rare Squamous epithelial cells per low power field    No organisms seen per oil power field  Preliminary Report (19 Dec 2018 17:00):    Normal Respiratory Carli present    Culture - Acid Fast - Bronchial w/Smear (collected 18 Dec 2018 14:12)  Source: .Broncial Bronchoalveolar Washings    Culture - Acid Fast - Bronchial w/Smear (collected 18 Dec 2018 14:12)  Source: .Broncial Bronchoalveolar Lavage    Culture - Blood (collected 17 Dec 2018 11:36)  Source: .Blood None  Preliminary Report (19 Dec 2018 01:02):    No growth to date.        Mode: standby        acetaminophen    Suspension .. 650 milliGRAM(s) Enteral Tube every 6 hours PRN  ALBUTerol   0.5% 2.5 milliGRAM(s) Nebulizer every 4 hours PRN  amiodarone    Tablet 200 milliGRAM(s) Oral daily  ARIPiprazole 20 milliGRAM(s) Oral daily  betamethasone valerate 0.1% Cream 1 Application(s) Topical two times a day  chlorhexidine 0.12% Liquid 15 milliLiter(s) Oral Mucosa two times a day  chlorhexidine 4% Liquid 1 Application(s) Topical <User Schedule>  clotrimazole 1% Cream 1 Application(s) Topical two times a day  dextrose 50% Injectable 50 milliLiter(s) IV Push every 15 minutes  dextrose 50% Injectable 25 milliLiter(s) IV Push every 15 minutes  docusate sodium Liquid 200 milliGRAM(s) Enteral Tube two times a day  enoxaparin Injectable 100 milliGRAM(s) SubCutaneous <User Schedule>  insulin Infusion 2 Unit(s)/Hr IV Continuous <Continuous>  lactulose Syrup 20 Gram(s) Oral every 8 hours  meropenem  IVPB      meropenem  IVPB 1000 milliGRAM(s) IV Intermittent every 8 hours  metoprolol tartrate 12.5 milliGRAM(s) Oral two times a day  norepinephrine Infusion 0.05 MICROgram(s)/kG/Min IV Continuous <Continuous>  pantoprazole   Suspension 40 milliGRAM(s) Oral daily  predniSONE   Tablet 30 milliGRAM(s) Oral daily  propofol Infusion 10 MICROgram(s)/kG/Min IV Continuous <Continuous>

## 2018-12-20 NOTE — PHYSICAL THERAPY INITIAL EVALUATION ADULT - IMPAIRMENTS CONTRIBUTING TO GAIT DEVIATIONS, PT EVAL
impaired postural control/narrow base of support/impaired balance/decreased strength/decreased endurance

## 2018-12-20 NOTE — PHYSICAL THERAPY INITIAL EVALUATION ADULT - GENERAL OBSERVATIONS, REHAB EVAL
14:54-15:14 Chart reviewed. Pt encountered sitting in chair, may be seen by Physical Therapist as confirmed with Nurse. Patient denied pain at rest and would like to try to stand but "feels weak"; +tele; +O2 via NC; +gianni

## 2018-12-20 NOTE — PHYSICAL THERAPY INITIAL EVALUATION ADULT - IMPAIRED TRANSFERS: SIT/STAND, REHAB EVAL
narrow base of support/impaired postural control/decreased endurance/impaired balance/decreased strength

## 2018-12-20 NOTE — PHYSICAL THERAPY INITIAL EVALUATION ADULT - PATIENT PROFILE REVIEW, REHAB EVAL
General Surgery Week 3 Survey      Responses   Facility patient discharged from?  [facility]   Does the patient have one of the following disease processes/diagnoses(primary or secondary)?  General Surgery   Week 3 attempt successful?  Yes   Call start time  1410   Rescheduled  Rescheduled-pt requested [pt is not at home today for call]   Call end time  1410   Discharge diagnosis  Anterior cervical discectomy C5 6 C6 7,     Anterior arthrodesis C5 6 C6 7,     Placement of the vG2 bone graft C6 7          Nely Dawn RN   yes

## 2018-12-20 NOTE — PROGRESS NOTE ADULT - ASSESSMENT
58/M hx of DM & HTN & schizophrenia?  presented to ED with SOB with orthopnea. Became hypotensive in ED after IV NTG was given for elevated BP. Required intubation and pressor support. Admit to CCU for Acute respiratory failure from Septic Shock requiring intubation and pressor support. CXR concerning for LLL infiltrate - PNA? vs ARDS?    Acute on chronic hypercapnic respiratory failure on MV  Septic shock secondary to aspiration PNA  Pulmonary edema secondary to CHF  HO Schizophrenia  Smoker    [  ] TLC  [   ] Nichole  [  ] intubated  [ ] Sedated  [  ] Pressor    Problem List  Acute on chronic hypercapnic respiratory failure on MV  Septic shock secondary to aspiration PNA - off pressors   Pulmonary edema secondary to HFrEF  HO Schizophrenia  Smoker    - RVP negative  - UA - trace leuks; neg nitrates; Ur Cx neg  - Sputum Cx neg  - Bld cx -neg (12/9)  - CE 0.04>0.05>0.02  - rEEG: Abnormal due to the presence of: generalized slowing as above  - MRSA negative  - All cx negative  - CTH neg  - EEG - gen. slowing  - Bronchoscopy (12/18)- All bronchi were patent with no endobronchial lesions and no mucosal lesions noted.  The Left tracheobronchial tree was revealed thin secretions at the left upper and lower lobe segments;   - No org on Bronch gram stain    # Septic Shock due to aspiration PNA & Acute on chronic hypercapneic respiratory failure - resolved, extubated (12/18), off pressors, extubated  - Meropenem (from 12/10) - completed today for 10 day course  - Prednisone PO daily tapering dose (12/10 was start)     # Pulmonary edema secondary to CHF   - TTE (12/10) - mod impaired function 30-35%, LV dilated, LV enlarged, mod MR, mild TR, LA dilation, small pericardial effusion, RV pressure 31  - C/w B-blocker & ARB  - Cardio following  - Repeat TTE    # New AF ([id number]) now converted to sinus  - PO Amiodarone; PO B-blocker   - LMWH for a/c    # Hypernatremia - FWD 3.3L; D5W    # DM - insulin infusion  # Hx of HTN - c/w Losartan & B-blocker  # Schizophrenia - on ariprazole  # GI ppx   # Hx of EtOH use - start thiamine & folic acid    Full Code

## 2018-12-20 NOTE — PROGRESS NOTE ADULT - SUBJECTIVE AND OBJECTIVE BOX
Patient is a 58y old  Male who presents with a chief complaint of Septic Shock (19 Dec 2018 09:09)      Over Night Events:  Patient seen and examined s/p extubation did well       ROS:  See HPI    PHYSICAL EXAM    ICU Vital Signs Last 24 Hrs  T(C): 36.6 (20 Dec 2018 07:01), Max: 37.5 (19 Dec 2018 11:00)  T(F): 97.8 (20 Dec 2018 07:01), Max: 99.5 (19 Dec 2018 11:00)  HR: 61 (20 Dec 2018 05:00) (61 - 78)  BP: 154/74 (20 Dec 2018 05:00) (143/74 - 162/78)  BP(mean): 107 (20 Dec 2018 05:00) (101 - 112)  ABP: --  ABP(mean): --  RR: 20 (20 Dec 2018 07:01) (18 - 37)  SpO2: 98% (20 Dec 2018 05:07) (87% - 99%)      General: Aox3   HEENT:   seven             Lymph Nodes: NO cervical LN   Lungs: Bilateral crackles mild   Cardiovascular: Regular   Abdomen: Soft, Positive BS  Extremities: No clubbing   Skin: warm   Neurological: no focal deficit   Musculoskeletal: move all ext     I&O's Detail    19 Dec 2018 07:01  -  20 Dec 2018 07:00  --------------------------------------------------------  IN:    insulin Infusion: 21.5 mL    IV PiggyBack: 100 mL    Oral Fluid: 720 mL  Total IN: 841.5 mL    OUT:    Indwelling Catheter - Urethral: 2270 mL  Total OUT: 2270 mL    Total NET: -1428.5 mL      20 Dec 2018 07:01  -  20 Dec 2018 07:31  --------------------------------------------------------  IN:  Total IN: 0 mL    OUT:    Indwelling Catheter - Urethral: 150 mL  Total OUT: 150 mL    Total NET: -150 mL          LABS:                          15.5   10.23 )-----------( 240      ( 20 Dec 2018 06:09 )             46.2         20 Dec 2018 06:09    146    |  100    |  28     ----------------------------<  153    3.8     |  36     |  0.5      Ca    9.2        20 Dec 2018 06:09  Phos  2.2       20 Dec 2018 06:09  Mg     2.2       20 Dec 2018 06:09    TPro  6.4    /  Alb  3.5    /  TBili  0.9    /  DBili  x      /  AST  29     /  ALT  40     /  AlkPhos  89     20 Dec 2018 06:09  Amylase x     lipase x                                                                                                                                                    Culture - Fungal, Bronchial (collected 18 Dec 2018 14:12)  Source: .Broncial Bronchoalveolar Washings  Preliminary Report (19 Dec 2018 09:08):    Testing in progress    Culture - Fungal, Bronchial (collected 18 Dec 2018 14:12)  Source: .Broncial Bronchoalveolar Lavage  Preliminary Report (19 Dec 2018 09:08):    Testing in progress    Culture - Bronchial (collected 18 Dec 2018 14:12)  Source: Bronch Wash Bronchoalveolar Washings  Gram Stain (18 Dec 2018 23:56):    Rare Squamous epithelial cells per low power field    Moderate polymorphonuclear leukocytes per low power field    No organisms seen per oil power field  Preliminary Report (19 Dec 2018 16:59):    No growth to date.    Culture - Bronchial (collected 18 Dec 2018 14:12)  Source: Bronch Wash Bronchoalveolar Lavage  Gram Stain (18 Dec 2018 23:53):    Few polymorphonuclear leukocytes per low power field    Rare Squamous epithelial cells per low power field    No organisms seen per oil power field  Preliminary Report (19 Dec 2018 17:00):    Normal Respiratory Carli present    Culture - Acid Fast - Bronchial w/Smear (collected 18 Dec 2018 14:12)  Source: .Broncial Bronchoalveolar Washings    Culture - Acid Fast - Bronchial w/Smear (collected 18 Dec 2018 14:12)  Source: .Broncial Bronchoalveolar Lavage    Culture - Blood (collected 17 Dec 2018 11:36)  Source: .Blood None  Preliminary Report (19 Dec 2018 01:02):    No growth to date.                                                   Mode: standby                                      ABG - ( 19 Dec 2018 10:36 )  pH, Arterial: 7.50  pH, Blood: x     /  pCO2: 52    /  pO2: 92    / HCO3: 40    / Base Excess: 14.5  /  SaO2: 96                  MEDICATIONS  (STANDING):  amiodarone    Tablet 200 milliGRAM(s) Oral daily  ARIPiprazole 20 milliGRAM(s) Oral daily  betamethasone valerate 0.1% Cream 1 Application(s) Topical two times a day  chlorhexidine 0.12% Liquid 15 milliLiter(s) Oral Mucosa two times a day  chlorhexidine 4% Liquid 1 Application(s) Topical <User Schedule>  clotrimazole 1% Cream 1 Application(s) Topical two times a day  dextrose 50% Injectable 50 milliLiter(s) IV Push every 15 minutes  dextrose 50% Injectable 25 milliLiter(s) IV Push every 15 minutes  docusate sodium Liquid 200 milliGRAM(s) Enteral Tube two times a day  enoxaparin Injectable 100 milliGRAM(s) SubCutaneous <User Schedule>  insulin Infusion 2 Unit(s)/Hr (2 mL/Hr) IV Continuous <Continuous>  lactulose Syrup 20 Gram(s) Oral every 8 hours  meropenem  IVPB      meropenem  IVPB 1000 milliGRAM(s) IV Intermittent every 8 hours  metoprolol tartrate 12.5 milliGRAM(s) Oral two times a day  norepinephrine Infusion 0.05 MICROgram(s)/kG/Min (9.356 mL/Hr) IV Continuous <Continuous>  pantoprazole   Suspension 40 milliGRAM(s) Oral daily  predniSONE   Tablet 30 milliGRAM(s) Oral daily  propofol Infusion 10 MICROgram(s)/kG/Min (5.802 mL/Hr) IV Continuous <Continuous>    MEDICATIONS  (PRN):  acetaminophen    Suspension .. 650 milliGRAM(s) Enteral Tube every 6 hours PRN Temp greater or equal to 38C (100.4F)  ALBUTerol   0.5% 2.5 milliGRAM(s) Nebulizer every 4 hours PRN Shortness of Breath and/or Wheezing          Xrays:  TLC:  OG:  ET tube:                                                                                    decrease opacity b/l    ECHO:

## 2018-12-20 NOTE — PROGRESS NOTE ADULT - ASSESSMENT
Patient  off vent. He was drinking 1 quart vodka a day.  Echo EF 30 -35 %. On beta . HR acceptable. Start arb if stable Now in NSR. PO amiodarone . Lovenox for now. No further v-tach.  Lasix prn. Will repeat echo to see if LV function improved,

## 2018-12-20 NOTE — PHYSICAL THERAPY INITIAL EVALUATION ADULT - GAIT DEVIATIONS NOTED, PT EVAL
decreased step length/decreased melissa/decreased weight-shifting ability/trunk, hips, and knees slightly flexed dec VINCE, incomplete foot clearance,

## 2018-12-20 NOTE — PROGRESS NOTE ADULT - ASSESSMENT
IMPRESSION:  Acute on chronic hypercapnic respiratory failure on MV  Septic shock secondary to aspiration PNA  Pulmonary edema secondary to HfrEF  HO Schizophrenia  Smoker  hypernatremia  Afib     PLAN:    CNS: no sedation     HEENT: Oral care    PULMONARY: HOB at 45 degrees keep pox > 92 %      CARDIOVASCULAR: Keep I<O; FU with cardio need cardiac cath   keep Is <=OS lasix as needed   GI: GI prophylaxis. start feed he passed speech and swallow     RENAL:  FU lytes; free water FOLLOW BMP , NA     INFECTIOUS DISEASE: Panculture; c/w rex total 10 days     HEMATOLOGICAL:  DVT prophylaxis lovenox therapeutic for afib follow cardiology     ENDOCRINE:  Follow up FS.  Insulin protocol if needed.    d/c archer   downgrade to tele as per cardiology   follow cardiology

## 2018-12-20 NOTE — PROGRESS NOTE ADULT - SUBJECTIVE AND OBJECTIVE BOX
SUBJECTIVE:    Patient is a 58y old Male who presents with a chief complaint of Septic Shock (20 Dec 2018 08:13)    Currently admitted to medicine with the primary diagnosis of Acute respiratory failure with hypoxia and hypercapnia     Today is hospital day 12d. This morning he is resting comfortably in bed and reports no new issues or overnight events.     PAST MEDICAL & SURGICAL HISTORY  ETOH abuse  HLD (hyperlipidemia)  Hypertension, unspecified type  COPD (chronic obstructive pulmonary disease)  Schizophrenia  Psych & behavrl factors assoc w disord or dis classd elswhr  Diabetes  No significant past surgical history    SOCIAL HISTORY:  Negative for smoking/alcohol/drug use.     ALLERGIES:  No Known Allergies    MEDICATIONS:  STANDING MEDICATIONS  amiodarone    Tablet 200 milliGRAM(s) Oral daily  ARIPiprazole 20 milliGRAM(s) Oral daily  betamethasone valerate 0.1% Cream 1 Application(s) Topical two times a day  chlorhexidine 0.12% Liquid 15 milliLiter(s) Oral Mucosa two times a day  chlorhexidine 4% Liquid 1 Application(s) Topical <User Schedule>  clotrimazole 1% Cream 1 Application(s) Topical two times a day  dextrose 50% Injectable 50 milliLiter(s) IV Push every 15 minutes  dextrose 50% Injectable 25 milliLiter(s) IV Push every 15 minutes  docusate sodium Liquid 200 milliGRAM(s) Enteral Tube two times a day  enoxaparin Injectable 100 milliGRAM(s) SubCutaneous <User Schedule>  insulin Infusion 2 Unit(s)/Hr IV Continuous <Continuous>  lactulose Syrup 20 Gram(s) Oral every 8 hours  meropenem  IVPB      meropenem  IVPB 1000 milliGRAM(s) IV Intermittent every 8 hours  metoprolol tartrate 12.5 milliGRAM(s) Oral two times a day  norepinephrine Infusion 0.05 MICROgram(s)/kG/Min IV Continuous <Continuous>  pantoprazole   Suspension 40 milliGRAM(s) Oral daily  predniSONE   Tablet 30 milliGRAM(s) Oral daily  propofol Infusion 10 MICROgram(s)/kG/Min IV Continuous <Continuous>    PRN MEDICATIONS  acetaminophen    Suspension .. 650 milliGRAM(s) Enteral Tube every 6 hours PRN  ALBUTerol   0.5% 2.5 milliGRAM(s) Nebulizer every 4 hours PRN    VITALS:   T(F): 97.8  HR: 64  BP: 155/74  RR: 20  SpO2: 98%    LABS:                        15.5   10.23 )-----------( 240      ( 20 Dec 2018 06:09 )             46.2     12-20    146  |  100  |  28<H>  ----------------------------<  153<H>  3.8   |  36<H>  |  0.5<L>    Ca    9.2      20 Dec 2018 06:09  Phos  2.2     12-20  Mg     2.2     12-20    TPro  6.4  /  Alb  3.5  /  TBili  0.9  /  DBili  x   /  AST  29  /  ALT  40  /  AlkPhos  89  12-20        ABG - ( 19 Dec 2018 10:36 )  pH, Arterial: 7.50  pH, Blood: x     /  pCO2: 52    /  pO2: 92    / HCO3: 40    / Base Excess: 14.5  /  SaO2: 96                    Culture - Fungal, Bronchial (collected 18 Dec 2018 14:12)  Source: .Broncial Bronchoalveolar Washings  Preliminary Report (19 Dec 2018 09:08):    Testing in progress    Culture - Fungal, Bronchial (collected 18 Dec 2018 14:12)  Source: .Broncial Bronchoalveolar Lavage  Preliminary Report (20 Dec 2018 07:54):    Rare Yeast like cells    Culture - Bronchial (collected 18 Dec 2018 14:12)  Source: Bronch Wash Bronchoalveolar Washings  Gram Stain (18 Dec 2018 23:56):    Rare Squamous epithelial cells per low power field    Moderate polymorphonuclear leukocytes per low power field    No organisms seen per oil power field  Preliminary Report (19 Dec 2018 16:59):    No growth to date.    Culture - Bronchial (collected 18 Dec 2018 14:12)  Source: Bronch Wash Bronchoalveolar Lavage  Gram Stain (18 Dec 2018 23:53):    Few polymorphonuclear leukocytes per low power field    Rare Squamous epithelial cells per low power field    No organisms seen per oil power field  Preliminary Report (19 Dec 2018 17:00):    Normal Respiratory Carli present    Culture - Acid Fast - Bronchial w/Smear (collected 18 Dec 2018 14:12)  Source: .Broncial Bronchoalveolar Washings    Culture - Acid Fast - Bronchial w/Smear (collected 18 Dec 2018 14:12)  Source: .Broncial Bronchoalveolar Lavage    Culture - Blood (collected 17 Dec 2018 11:36)  Source: .Blood None  Preliminary Report (19 Dec 2018 01:02):    No growth to date.          RADIOLOGY:    PHYSICAL EXAM:  GEN: No acute distress  LUNGS: Clear to auscultation bilaterally, no tachypnea   HEART: S1/S2 present. RRR.   ABD: Soft, non-tender, non-distended. Bowel sounds present  EXT: no LE edema, moves all ext  NEURO: follows all commands

## 2018-12-20 NOTE — SWALLOW BEDSIDE ASSESSMENT ADULT - SLP PERTINENT HISTORY OF CURRENT PROBLEM
admit with progressive SOB found to have sepsis and PNA; history of ETOH abuse. Psych history
Pt admitted 12/8/18 with acute respiratory failure 2/2 septic shock requiring intubation with mechanical ventilation. All nutrition/hydration via NGT. S/p extubation and NGT removal today

## 2018-12-21 LAB
ALBUMIN SERPL ELPH-MCNC: 3.5 G/DL — SIGNIFICANT CHANGE UP (ref 3.5–5.2)
ALP SERPL-CCNC: 81 U/L — SIGNIFICANT CHANGE UP (ref 30–115)
ALT FLD-CCNC: 34 U/L — SIGNIFICANT CHANGE UP (ref 0–41)
ANION GAP SERPL CALC-SCNC: 14 MMOL/L — SIGNIFICANT CHANGE UP (ref 7–14)
AST SERPL-CCNC: 23 U/L — SIGNIFICANT CHANGE UP (ref 0–41)
BASOPHILS # BLD AUTO: 0.03 K/UL — SIGNIFICANT CHANGE UP (ref 0–0.2)
BASOPHILS NFR BLD AUTO: 0.3 % — SIGNIFICANT CHANGE UP (ref 0–1)
BILIRUB SERPL-MCNC: 1 MG/DL — SIGNIFICANT CHANGE UP (ref 0.2–1.2)
BUN SERPL-MCNC: 31 MG/DL — HIGH (ref 10–20)
CALCIUM SERPL-MCNC: 8.8 MG/DL — SIGNIFICANT CHANGE UP (ref 8.5–10.1)
CHLORIDE SERPL-SCNC: 98 MMOL/L — SIGNIFICANT CHANGE UP (ref 98–110)
CO2 SERPL-SCNC: 32 MMOL/L — SIGNIFICANT CHANGE UP (ref 17–32)
CREAT SERPL-MCNC: 0.5 MG/DL — LOW (ref 0.7–1.5)
EOSINOPHIL # BLD AUTO: 0.25 K/UL — SIGNIFICANT CHANGE UP (ref 0–0.7)
EOSINOPHIL NFR BLD AUTO: 2.6 % — SIGNIFICANT CHANGE UP (ref 0–8)
ESTIMATED AVERAGE GLUCOSE: 160 MG/DL — HIGH (ref 68–114)
GLUCOSE BLDC GLUCOMTR-MCNC: 209 MG/DL — HIGH (ref 70–99)
GLUCOSE BLDC GLUCOMTR-MCNC: 225 MG/DL — HIGH (ref 70–99)
GLUCOSE BLDC GLUCOMTR-MCNC: 253 MG/DL — HIGH (ref 70–99)
GLUCOSE BLDC GLUCOMTR-MCNC: 265 MG/DL — HIGH (ref 70–99)
GLUCOSE BLDC GLUCOMTR-MCNC: 291 MG/DL — HIGH (ref 70–99)
GLUCOSE SERPL-MCNC: 190 MG/DL — HIGH (ref 70–99)
HBA1C BLD-MCNC: 7.2 % — HIGH (ref 4–5.6)
HCT VFR BLD CALC: 45.5 % — SIGNIFICANT CHANGE UP (ref 42–52)
HGB BLD-MCNC: 15.8 G/DL — SIGNIFICANT CHANGE UP (ref 14–18)
IMM GRANULOCYTES NFR BLD AUTO: 0.4 % — HIGH (ref 0.1–0.3)
LYMPHOCYTES # BLD AUTO: 1.62 K/UL — SIGNIFICANT CHANGE UP (ref 1.2–3.4)
LYMPHOCYTES # BLD AUTO: 16.9 % — LOW (ref 20.5–51.1)
MAGNESIUM SERPL-MCNC: 2.1 MG/DL — SIGNIFICANT CHANGE UP (ref 1.8–2.4)
MCHC RBC-ENTMCNC: 30.8 PG — SIGNIFICANT CHANGE UP (ref 27–31)
MCHC RBC-ENTMCNC: 34.7 G/DL — SIGNIFICANT CHANGE UP (ref 32–37)
MCV RBC AUTO: 88.7 FL — SIGNIFICANT CHANGE UP (ref 80–94)
MONOCYTES # BLD AUTO: 0.52 K/UL — SIGNIFICANT CHANGE UP (ref 0.1–0.6)
MONOCYTES NFR BLD AUTO: 5.4 % — SIGNIFICANT CHANGE UP (ref 1.7–9.3)
NEUTROPHILS # BLD AUTO: 7.1 K/UL — HIGH (ref 1.4–6.5)
NEUTROPHILS NFR BLD AUTO: 74.4 % — SIGNIFICANT CHANGE UP (ref 42.2–75.2)
PHOSPHATE SERPL-MCNC: 2.6 MG/DL — SIGNIFICANT CHANGE UP (ref 2.1–4.9)
PLATELET # BLD AUTO: 259 K/UL — SIGNIFICANT CHANGE UP (ref 130–400)
POTASSIUM SERPL-MCNC: 3.2 MMOL/L — LOW (ref 3.5–5)
POTASSIUM SERPL-SCNC: 3.2 MMOL/L — LOW (ref 3.5–5)
PROT SERPL-MCNC: 6 G/DL — SIGNIFICANT CHANGE UP (ref 6–8)
RBC # BLD: 5.13 M/UL — SIGNIFICANT CHANGE UP (ref 4.7–6.1)
RBC # FLD: 12.4 % — SIGNIFICANT CHANGE UP (ref 11.5–14.5)
SODIUM SERPL-SCNC: 144 MMOL/L — SIGNIFICANT CHANGE UP (ref 135–146)
WBC # BLD: 9.56 K/UL — SIGNIFICANT CHANGE UP (ref 4.8–10.8)
WBC # FLD AUTO: 9.56 K/UL — SIGNIFICANT CHANGE UP (ref 4.8–10.8)

## 2018-12-21 RX ORDER — METFORMIN HYDROCHLORIDE 850 MG/1
1 TABLET ORAL
Qty: 0 | Refills: 0 | COMMUNITY

## 2018-12-21 RX ORDER — LOSARTAN POTASSIUM 100 MG/1
1 TABLET, FILM COATED ORAL
Qty: 0 | Refills: 0 | COMMUNITY

## 2018-12-21 RX ORDER — ARIPIPRAZOLE 15 MG/1
0 TABLET ORAL
Qty: 0 | Refills: 0 | COMMUNITY
Start: 2018-12-21

## 2018-12-21 RX ORDER — INSULIN LISPRO 100/ML
8 VIAL (ML) SUBCUTANEOUS
Qty: 0 | Refills: 0 | COMMUNITY
Start: 2018-12-21

## 2018-12-21 RX ORDER — POTASSIUM CHLORIDE 20 MEQ
40 PACKET (EA) ORAL EVERY 4 HOURS
Qty: 0 | Refills: 0 | Status: COMPLETED | OUTPATIENT
Start: 2018-12-21 | End: 2018-12-21

## 2018-12-21 RX ORDER — INSULIN GLARGINE 100 [IU]/ML
24 INJECTION, SOLUTION SUBCUTANEOUS
Qty: 0 | Refills: 0 | COMMUNITY
Start: 2018-12-21

## 2018-12-21 RX ORDER — LIRAGLUTIDE 6 MG/ML
1.8 INJECTION SUBCUTANEOUS
Qty: 0 | Refills: 0 | COMMUNITY
Start: 2018-12-21

## 2018-12-21 RX ORDER — ALBUTEROL 90 UG/1
0.5 AEROSOL, METERED ORAL
Qty: 0 | Refills: 0 | COMMUNITY
Start: 2018-12-21

## 2018-12-21 RX ORDER — LIRAGLUTIDE 6 MG/ML
1.8 INJECTION SUBCUTANEOUS
Qty: 0 | Refills: 0 | COMMUNITY

## 2018-12-21 RX ORDER — THIAMINE MONONITRATE (VIT B1) 100 MG
1 TABLET ORAL
Qty: 0 | Refills: 0 | COMMUNITY
Start: 2018-12-21

## 2018-12-21 RX ORDER — LOSARTAN POTASSIUM 100 MG/1
1 TABLET, FILM COATED ORAL
Qty: 0 | Refills: 0 | COMMUNITY
Start: 2018-12-21

## 2018-12-21 RX ORDER — INSULIN DEGLUDEC 100 U/ML
15 INJECTION, SOLUTION SUBCUTANEOUS
Qty: 0 | Refills: 0 | COMMUNITY

## 2018-12-21 RX ORDER — ENOXAPARIN SODIUM 100 MG/ML
100 INJECTION SUBCUTANEOUS
Qty: 0 | Refills: 0 | COMMUNITY
Start: 2018-12-21

## 2018-12-21 RX ORDER — DOCUSATE SODIUM 100 MG
1 CAPSULE ORAL
Qty: 0 | Refills: 0 | COMMUNITY
Start: 2018-12-21

## 2018-12-21 RX ORDER — AMIODARONE HYDROCHLORIDE 400 MG/1
1 TABLET ORAL
Qty: 0 | Refills: 0 | COMMUNITY
Start: 2018-12-21

## 2018-12-21 RX ORDER — FUROSEMIDE 40 MG
1 TABLET ORAL
Qty: 0 | Refills: 0 | COMMUNITY
Start: 2018-12-21

## 2018-12-21 RX ORDER — METOPROLOL TARTRATE 50 MG
12.5 TABLET ORAL
Qty: 0 | Refills: 0 | COMMUNITY
Start: 2018-12-21

## 2018-12-21 RX ORDER — FOLIC ACID 0.8 MG
1 TABLET ORAL
Qty: 0 | Refills: 0 | COMMUNITY
Start: 2018-12-21

## 2018-12-21 RX ORDER — FUROSEMIDE 40 MG
20 TABLET ORAL DAILY
Qty: 0 | Refills: 0 | Status: DISCONTINUED | OUTPATIENT
Start: 2018-12-21 | End: 2018-12-22

## 2018-12-21 RX ORDER — METFORMIN HYDROCHLORIDE 850 MG/1
1 TABLET ORAL
Qty: 0 | Refills: 0 | COMMUNITY
Start: 2018-12-21

## 2018-12-21 RX ORDER — AMLODIPINE BESYLATE 2.5 MG/1
1 TABLET ORAL
Qty: 0 | Refills: 0 | COMMUNITY

## 2018-12-21 RX ORDER — INSULIN DEGLUDEC 100 U/ML
15 INJECTION, SOLUTION SUBCUTANEOUS
Qty: 0 | Refills: 0 | COMMUNITY
Start: 2018-12-21

## 2018-12-21 RX ORDER — ARIPIPRAZOLE 15 MG/1
400 TABLET ORAL
Qty: 0 | Refills: 0 | COMMUNITY

## 2018-12-21 RX ADMIN — Medication 20 MILLIGRAM(S): at 11:54

## 2018-12-21 RX ADMIN — Medication 1 APPLICATION(S): at 05:13

## 2018-12-21 RX ADMIN — Medication 100 MILLIGRAM(S): at 11:54

## 2018-12-21 RX ADMIN — Medication 1 MILLIGRAM(S): at 12:35

## 2018-12-21 RX ADMIN — Medication 1 APPLICATION(S): at 17:04

## 2018-12-21 RX ADMIN — Medication 8 UNIT(S): at 08:42

## 2018-12-21 RX ADMIN — AMIODARONE HYDROCHLORIDE 200 MILLIGRAM(S): 400 TABLET ORAL at 05:11

## 2018-12-21 RX ADMIN — ARIPIPRAZOLE 20 MILLIGRAM(S): 15 TABLET ORAL at 11:54

## 2018-12-21 RX ADMIN — Medication 40 MILLIEQUIVALENT(S): at 14:00

## 2018-12-21 RX ADMIN — Medication 4: at 08:25

## 2018-12-21 RX ADMIN — Medication 12.5 MILLIGRAM(S): at 05:12

## 2018-12-21 RX ADMIN — LOSARTAN POTASSIUM 50 MILLIGRAM(S): 100 TABLET, FILM COATED ORAL at 06:06

## 2018-12-21 RX ADMIN — Medication 40 MILLIEQUIVALENT(S): at 09:53

## 2018-12-21 RX ADMIN — PANTOPRAZOLE SODIUM 40 MILLIGRAM(S): 20 TABLET, DELAYED RELEASE ORAL at 08:45

## 2018-12-21 RX ADMIN — Medication 8 UNIT(S): at 12:32

## 2018-12-21 RX ADMIN — INSULIN GLARGINE 24 UNIT(S): 100 INJECTION, SOLUTION SUBCUTANEOUS at 21:20

## 2018-12-21 RX ADMIN — CHLORHEXIDINE GLUCONATE 1 APPLICATION(S): 213 SOLUTION TOPICAL at 09:54

## 2018-12-21 RX ADMIN — Medication 12.5 MILLIGRAM(S): at 17:09

## 2018-12-21 RX ADMIN — Medication 6: at 16:58

## 2018-12-21 RX ADMIN — Medication 8 UNIT(S): at 16:56

## 2018-12-21 RX ADMIN — Medication 20 MILLIGRAM(S): at 05:12

## 2018-12-21 RX ADMIN — Medication 6: at 12:38

## 2018-12-21 RX ADMIN — ENOXAPARIN SODIUM 100 MILLIGRAM(S): 100 INJECTION SUBCUTANEOUS at 05:11

## 2018-12-21 RX ADMIN — ENOXAPARIN SODIUM 100 MILLIGRAM(S): 100 INJECTION SUBCUTANEOUS at 17:05

## 2018-12-21 RX ADMIN — Medication 40 MILLIEQUIVALENT(S): at 17:09

## 2018-12-21 NOTE — PROGRESS NOTE ADULT - SUBJECTIVE AND OBJECTIVE BOX
Patient is a 58y old  Male who presents with a chief complaint of Septic Shock (20 Dec 2018 10:04)      T(F): 97.4 (12-20-18 @ 23:17), Max: 97.4 (12-20-18 @ 23:17)  HR: 74 (12-21-18 @ 05:16)  BP: 155/73 (12-21-18 @ 05:16)  RR: 29 (12-21-18 @ 05:16)  SpO2: 92% (12-21-18 @ 05:16) (92% - 99%)    PHYSICAL EXAM:  GENERAL: NAD, well-groomed, well-developed  HEAD:  Atraumatic, Normocephalic  EYES: EOMI, PERRLA, conjunctiva and sclera clear  ENMT: No tonsillar erythema, exudates, or enlargement; Moist mucous membranes, Good dentition, No lesions  NECK: Supple, No JVD, Normal thyroid  NERVOUS SYSTEM:  Alert & Oriented X3,  Motor Strength 5/5 B/L upper and lower extremities  CHEST/LUNG: Clear to percussion bilaterally; No rales, rhonchi, wheezing, or rubs  HEART: Regular rate and rhythm; No murmurs, rubs, or gallops  ABDOMEN: Soft, Nontender, Nondistended; Bowel sounds present  EXTREMITIES:   No clubbing, cyanosis, or edema  LYMPH: No lymphadenopathy noted  SKIN: No rashes or lesions    labs  12-20    146  |  100  |  28<H>  ----------------------------<  153<H>  3.8   |  36<H>  |  0.5<L>    Ca    9.2      20 Dec 2018 06:09  Phos  2.2     12-20  Mg     2.2     12-20    TPro  6.4  /  Alb  3.5  /  TBili  0.9  /  DBili  x   /  AST  29  /  ALT  40  /  AlkPhos  89  12-20                          15.8   9.56  )-----------( 259      ( 21 Dec 2018 06:05 )             45.5       Culture - Fungal, Bronchial (collected 18 Dec 2018 14:12)  Source: .Broncial Bronchoalveolar Washings  Preliminary Report (19 Dec 2018 09:08):    Testing in progress    Culture - Fungal, Bronchial (collected 18 Dec 2018 14:12)  Source: .Broncial Bronchoalveolar Lavage  Preliminary Report (20 Dec 2018 07:54):    Rare Yeast like cells    Culture - Bronchial (collected 18 Dec 2018 14:12)  Source: Bronch Wash Bronchoalveolar Washings  Gram Stain (18 Dec 2018 23:56):    Rare Squamous epithelial cells per low power field    Moderate polymorphonuclear leukocytes per low power field    No organisms seen per oil power field  Final Report (20 Dec 2018 19:59):    No growth at 48 hours    Culture - Bronchial (collected 18 Dec 2018 14:12)  Source: Bronch Wash Bronchoalveolar Lavage  Gram Stain (18 Dec 2018 23:53):    Few polymorphonuclear leukocytes per low power field    Rare Squamous epithelial cells per low power field    No organisms seen per oil power field  Final Report (20 Dec 2018 19:58):    Normal Respiratory Carli present    Culture - Acid Fast - Bronchial w/Smear (collected 18 Dec 2018 14:12)  Source: .Broncial Bronchoalveolar Washings    Culture - Acid Fast - Bronchial w/Smear (collected 18 Dec 2018 14:12)  Source: .Broncial Bronchoalveolar Lavage              acetaminophen   Tablet .. 650 milliGRAM(s) Oral every 6 hours PRN  ALBUTerol   0.5% 2.5 milliGRAM(s) Nebulizer every 4 hours PRN  amiodarone    Tablet 200 milliGRAM(s) Oral daily  ARIPiprazole 20 milliGRAM(s) Oral daily  betamethasone valerate 0.1% Cream 1 Application(s) Topical two times a day  chlorhexidine 0.12% Liquid 15 milliLiter(s) Oral Mucosa two times a day  chlorhexidine 4% Liquid 1 Application(s) Topical <User Schedule>  clotrimazole 1% Cream 1 Application(s) Topical two times a day  dextrose 5%. 1000 milliLiter(s) IV Continuous <Continuous>  dextrose 50% Injectable 50 milliLiter(s) IV Push every 15 minutes  dextrose 50% Injectable 25 milliLiter(s) IV Push every 15 minutes  docusate sodium 100 milliGRAM(s) Oral two times a day  enoxaparin Injectable 100 milliGRAM(s) SubCutaneous <User Schedule>  folic acid 1 milliGRAM(s) Oral daily  insulin glargine Injectable (LANTUS) 24 Unit(s) SubCutaneous at bedtime  insulin lispro (HumaLOG) corrective regimen sliding scale   SubCutaneous three times a day before meals  insulin lispro Injectable (HumaLOG) 8 Unit(s) SubCutaneous three times a day before meals  losartan 50 milliGRAM(s) Oral daily  metoprolol tartrate 12.5 milliGRAM(s) Oral two times a day  pantoprazole    Tablet 40 milliGRAM(s) Oral before breakfast  predniSONE   Tablet 20 milliGRAM(s) Oral daily  thiamine 100 milliGRAM(s) Oral daily

## 2018-12-21 NOTE — PROGRESS NOTE ADULT - ASSESSMENT
Patient  off vent. He was drinking 1 quart vodka a day.  Echo now EF 35 -44 %. On beta and arb .  EF better. HR acceptable.  Now in NSR. PO amiodarone . Lovenox for now. No further v-tach.  Lasix prn. Medical Rx for now. .Patient told no Alcohol. Dobutamine SE or se in about 6 weeks as outpatient. Without alcohol lv may continue to improve.

## 2018-12-21 NOTE — PROGRESS NOTE ADULT - SUBJECTIVE AND OBJECTIVE BOX
SUBJECTIVE:    Patient is a 58y old Male who presents with a chief complaint of Septic Shock (21 Dec 2018 10:07)    Currently admitted to medicine with the primary diagnosis of Acute respiratory failure with hypoxia and hypercapnia     Today is hospital day 13d. This morning he is resting comfortably in bed and reports no new issues or overnight events.     PAST MEDICAL & SURGICAL HISTORY  ETOH abuse  HLD (hyperlipidemia)  Hypertension, unspecified type  COPD (chronic obstructive pulmonary disease)  Schizophrenia  Psych & behavrl factors assoc w disord or dis classd elswhr  Diabetes  No significant past surgical history    SOCIAL HISTORY:  Negative for smoking/alcohol/drug use.     ALLERGIES:  No Known Allergies    MEDICATIONS:  STANDING MEDICATIONS  amiodarone    Tablet 200 milliGRAM(s) Oral daily  ARIPiprazole 20 milliGRAM(s) Oral daily  betamethasone valerate 0.1% Cream 1 Application(s) Topical two times a day  chlorhexidine 0.12% Liquid 15 milliLiter(s) Oral Mucosa two times a day  chlorhexidine 4% Liquid 1 Application(s) Topical <User Schedule>  clotrimazole 1% Cream 1 Application(s) Topical two times a day  dextrose 50% Injectable 50 milliLiter(s) IV Push every 15 minutes  dextrose 50% Injectable 25 milliLiter(s) IV Push every 15 minutes  docusate sodium 100 milliGRAM(s) Oral two times a day  enoxaparin Injectable 100 milliGRAM(s) SubCutaneous <User Schedule>  folic acid 1 milliGRAM(s) Oral daily  furosemide    Tablet 20 milliGRAM(s) Oral daily  insulin glargine Injectable (LANTUS) 24 Unit(s) SubCutaneous at bedtime  insulin lispro (HumaLOG) corrective regimen sliding scale   SubCutaneous three times a day before meals  insulin lispro Injectable (HumaLOG) 8 Unit(s) SubCutaneous three times a day before meals  losartan 50 milliGRAM(s) Oral daily  metoprolol tartrate 12.5 milliGRAM(s) Oral two times a day  pantoprazole    Tablet 40 milliGRAM(s) Oral before breakfast  potassium chloride    Tablet ER 40 milliEquivalent(s) Oral every 4 hours  predniSONE   Tablet   Oral   predniSONE   Tablet 20 milliGRAM(s) Oral daily  thiamine 100 milliGRAM(s) Oral daily    PRN MEDICATIONS  acetaminophen   Tablet .. 650 milliGRAM(s) Oral every 6 hours PRN  ALBUTerol   0.5% 2.5 milliGRAM(s) Nebulizer every 4 hours PRN    VITALS:   T(F): 95.9  HR: 66  BP: 144/67  RR: 19  SpO2: 96%    LABS:                        15.8   9.56  )-----------( 259      ( 21 Dec 2018 06:05 )             45.5     12-21    144  |  98  |  31<H>  ----------------------------<  190<H>  3.2<L>   |  32  |  0.5<L>    Ca    8.8      21 Dec 2018 06:05  Phos  2.6     12-21  Mg     2.1     12-21    TPro  6.0  /  Alb  3.5  /  TBili  1.0  /  DBili  x   /  AST  23  /  ALT  34  /  AlkPhos  81  12-21              Culture - Fungal, Bronchial (collected 18 Dec 2018 14:12)  Source: .Broncial Bronchoalveolar Washings  Preliminary Report (19 Dec 2018 09:08):    Testing in progress    Culture - Fungal, Bronchial (collected 18 Dec 2018 14:12)  Source: .Broncial Bronchoalveolar Lavage  Preliminary Report (20 Dec 2018 07:54):    Rare Yeast like cells    Culture - Bronchial (collected 18 Dec 2018 14:12)  Source: Bronch Wash Bronchoalveolar Washings  Gram Stain (18 Dec 2018 23:56):    Rare Squamous epithelial cells per low power field    Moderate polymorphonuclear leukocytes per low power field    No organisms seen per oil power field  Final Report (20 Dec 2018 19:59):    No growth at 48 hours    Culture - Bronchial (collected 18 Dec 2018 14:12)  Source: Bronch Wash Bronchoalveolar Lavage  Gram Stain (18 Dec 2018 23:53):    Few polymorphonuclear leukocytes per low power field    Rare Squamous epithelial cells per low power field    No organisms seen per oil power field  Final Report (20 Dec 2018 19:58):    Normal Respiratory Carli present    Culture - Acid Fast - Bronchial w/Smear (collected 18 Dec 2018 14:12)  Source: .Broncial Bronchoalveolar Washings    Culture - Acid Fast - Bronchial w/Smear (collected 18 Dec 2018 14:12)  Source: .Broncial Bronchoalveolar Lavage          RADIOLOGY:    PHYSICAL EXAM:  GEN: No acute distress, sitting up in chair  LUNGS: Clear to auscultation bilaterally   HEART: S1/S2 present. RRR.   ABD: Soft, non-tender, non-distended. Bowel sounds present  EXT: no LE edema  NEURO: follows all commands

## 2018-12-21 NOTE — SWALLOW BEDSIDE ASSESSMENT ADULT - SWALLOW EVAL: RECOMMENDED DIET
Dysphagia diet I puree consistency thin liquids.
Dysphagia Diet III Mechanical soft consistency with cut up meats thin liquids.
Dysphagia diet I puree consistency and thin liquids

## 2018-12-21 NOTE — OCCUPATIONAL THERAPY INITIAL EVALUATION ADULT - PLANNED THERAPY INTERVENTIONS, OT EVAL
bed mobility training/strengthening/stretching/ADL retraining/balance training/motor coordination training/transfer training/ROM

## 2018-12-21 NOTE — DISCHARGE NOTE ADULT - CARE PROVIDER_API CALL
Ky Sorensen), Cardiovascular Disease; Internal Medicine  [address] [place] [id number]  Phone: [phone]  Fax: [phone] Ky Sorensen), Cardiovascular Disease; Internal Medicine  [address] [place] [address]  Phone: [phone]  Fax: [phone]    Ervin Painting), Internal Medicine  [address] [place] [id number]  Phone: [phone]  Fax: [phone]

## 2018-12-21 NOTE — SWALLOW BEDSIDE ASSESSMENT ADULT - SWALLOW EVAL: DIAGNOSIS
mild oral impairment with prolonged prep of puree anterior loss of thins, no signs of pharyngeal impairment.
mild oral impairment impulsiveness noted no signs of pharyngeal impairment. mild dysphonia remains
suspected pharyngeal dysphagia with overt signs and symptoms of penetration or aspiration on soft solid trials. + toleration observed without overt symptoms of penetration/aspiration on thin liquid and puree trials.

## 2018-12-21 NOTE — DISCHARGE NOTE ADULT - CARE PLAN
Principal Discharge DX:	Septic shock  Goal:	Infection resolved  Assessment and plan of treatment:	Monitoring is needed only. Antiobiotic course complete.  Secondary Diagnosis:	Acute congestive heart failure, unspecified heart failure type  Goal:	Medical management  Assessment and plan of treatment:	Continue losartan & metoprolol. Low Sodium diet. Follow up with cardiology - will need ischemic work-up as outpatient with cardiology (Stress test/Stress echo). Avoid alcohol use. Principal Discharge DX:	Septic shock  Goal:	Infection resolved  Assessment and plan of treatment:	Monitoring is needed only. Antiobiotic course complete. Complete tapered prednisone regimen  Secondary Diagnosis:	Acute congestive heart failure, unspecified heart failure type  Goal:	Medical management  Assessment and plan of treatment:	Continue losartan & metoprolol. Low Sodium diet. Follow up with cardiology - will need ischemic work-up as outpatient with cardiology (Stress test/Stress echo). Avoid alcohol use.  Secondary Diagnosis:	Atrial fibrillation  Goal:	Rate control & stroke prevention  Assessment and plan of treatment:	Continue amiodarone for rate control. Continue lovenox for prevention of stroke. Follow up with cardiology.

## 2018-12-21 NOTE — PROGRESS NOTE ADULT - ASSESSMENT
58/M hx of DM & HTN & schizophrenia?  presented to ED with SOB with orthopnea. Became hypotensive in ED after IV NTG was given for elevated BP. Required intubation and pressor support. Admit to CCU for Acute respiratory failure from Septic Shock requiring intubation and pressor support. CXR concerning for LLL infiltrate - PNA? vs ARDS?    Acute on chronic hypercapnic respiratory failure on MV  Septic shock secondary to aspiration PNA  Pulmonary edema secondary to CHF  HO Schizophrenia  Smoker    Problem List  Acute on chronic hypercapnic respiratory failure on MV  Septic shock secondary to aspiration PNA - off pressors   Pulmonary edema secondary to HFrEF  HO Schizophrenia  Smoker    - RVP negative  - UA - trace leuks; neg nitrates; Ur Cx neg  - Sputum Cx neg  - Bld cx -neg (12/9)  - CE 0.04>0.05>0.02  - rEEG: Abnormal due to the presence of: generalized slowing as above  - MRSA negative  - All cx negative  - CTH neg  - EEG - gen. slowing  - Bronchoscopy (12/18)- All bronchi were patent with no endobronchial lesions and no mucosal lesions noted.  The Left tracheobronchial tree was revealed thin secretions at the left upper and lower lobe segments;   - No org on Bronch gram stain  - TTE (12/20) - EF 35-44%    # Septic Shock due to aspiration PNA & Acute on chronic hypercapneic respiratory failure - resolved  - Abx complete  - Prednisone taper    # HFrEF - Etoh related? ischemic related?  - TTE (12/10) - mod impaired function 30-35%, LV dilated, LV enlarged, mod MR, mild TR, LA dilation, small pericardial effusion, RV pressure 31  - TTE (12/20) - EF 35-44%  - C/w B-blocker & ARB  - Cardio following - will need outpt ischemic work-up in 6wks    # New AF (CHADSVASC-3) now converted to sinus  - PO Amiodarone; PO B-blocker   - LMWH for a/c    # DM - basal bolus - target FSG < 180  # Hx of HTN - c/w Losartan & B-blocker  # Schizophrenia - on ariprazole  # GI ppx   # Hx of EtOH use - start thiamine & folic acid    # PT/OT/Rehab - following   [  xx  ]  Short Term Rehab in Skilled Nursing Facility    Full Code 58/M hx of DM & HTN & schizophrenia?  presented to ED with SOB with orthopnea. Became hypotensive in ED after IV NTG was given for elevated BP. Required intubation and pressor support. Admit to CCU for Acute respiratory failure from Septic Shock requiring intubation and pressor support. CXR concerning for LLL infiltrate - PNA? vs ARDS?    Problem List  Acute on chronic hypercapnic respiratory failure on MV  Septic shock secondary to aspiration PNA - off pressors   Pulmonary edema secondary to HFrEF  HO Schizophrenia  Smoker    - RVP negative  - UA - trace leuks; neg nitrates; Ur Cx neg  - Sputum Cx neg  - Bld cx -neg (12/9)  - CE 0.04>0.05>0.02  - rEEG: Abnormal due to the presence of: generalized slowing as above  - MRSA negative  - All cx negative  - CTH neg  - EEG - gen. slowing  - Bronchoscopy (12/18)- All bronchi were patent with no endobronchial lesions and no mucosal lesions noted.  The Left tracheobronchial tree was revealed thin secretions at the left upper and lower lobe segments;   - No org on Bronch gram stain  - TTE (12/20) - EF 35-44%    # Septic Shock due to aspiration PNA & Acute on chronic hypercapneic respiratory failure - resolved  - Abx complete  - Prednisone taper    # HFrEF - Etoh related? ischemic related?  - TTE (12/10) - mod impaired function 30-35%, LV dilated, LV enlarged, mod MR, mild TR, LA dilation, small pericardial effusion, RV pressure 31  - TTE (12/20) - EF 35-44%  - C/w B-blocker & ARB  - Cardio following - will need outpt ischemic work-up in 6wks    # New AF (CHADSVASC-3) now converted to sinus  - PO Amiodarone; PO B-blocker   - LMWH for a/c    # DM - basal bolus - target FSG < 180  # Hx of HTN - c/w Losartan & B-blocker  # Schizophrenia - on ariprazole  # GI ppx   # Hx of EtOH use - start thiamine & folic acid    # PT/OT/Rehab - following   [  xx  ]  Short Term Rehab in Skilled Nursing Facility    Potassium repleted    Full Code

## 2018-12-21 NOTE — DISCHARGE NOTE ADULT - PLAN OF CARE
Infection resolved Monitoring is needed only. Antiobiotic course complete. Medical management Continue losartan & metoprolol. Low Sodium diet. Follow up with cardiology - will need ischemic work-up as outpatient with cardiology (Stress test/Stress echo). Avoid alcohol use. Monitoring is needed only. Antiobiotic course complete. Complete tapered prednisone regimen Rate control & stroke prevention Continue amiodarone for rate control. Continue lovenox for prevention of stroke. Follow up with cardiology.

## 2018-12-21 NOTE — SWALLOW BEDSIDE ASSESSMENT ADULT - NS ASR SWALLOW FINDINGS DISCUS
Physician/Nursing/Patient
Physician/Nursing/Patient
Physician/Nursing/Dr. Magaña ([id number])/Patient/Family

## 2018-12-21 NOTE — OCCUPATIONAL THERAPY INITIAL EVALUATION ADULT - GENERAL OBSERVATIONS, REHAB EVAL
pt received seat in recliner while eating breakfast. + tele, +O2 via NC, ok to be see by OT as confirmed by RN

## 2018-12-21 NOTE — DISCHARGE NOTE ADULT - PATIENT PORTAL LINK FT
You can access the [facility], offered by [facility], by registering with the following website: [email or web address]

## 2018-12-21 NOTE — SWALLOW BEDSIDE ASSESSMENT ADULT - NS SPL SWALLOW CLINIC TRIAL FT
+ toleration observed without overt symptoms of penetration/aspiration
+ toleration observed without overt symptoms of penetration/aspiration

## 2018-12-21 NOTE — SWALLOW BEDSIDE ASSESSMENT ADULT - COMMENTS
PATIENT REFUSED SOLID TRIALS, no reason given. SLP will reattempt.
Patient reports voice is better, hoarseness remains
Vocal quality appears WNL. Pt requesting water.

## 2018-12-21 NOTE — DISCHARGE NOTE ADULT - MEDICATION SUMMARY - MEDICATIONS TO TAKE
I will START or STAY ON the medications listed below when I get home from the hospital:    predniSONE  -- Take 30mg daily x2 days  Take 20mg daily x2 days  Take 10mg daily x2 days  -- Indication: For Respiratory failure    losartan 50 mg oral tablet  -- 1 tab(s) by mouth once a day  -- Indication: For Acute congestive heart failure, unspecified heart failure type    amiodarone 200 mg oral tablet  -- 1 tab(s) by mouth once a day  -- Indication: For Atrial fibrillation    enoxaparin  -- 100 unit(s) subcutaneous 2 times a day  -- Indication: For Atrial fibrillation    metFORMIN 1000 mg oral tablet  -- 1 tab(s) by mouth 2 times a day  -- Indication: For Diabetes    Tresiba FlexTouch 100 units/mL subcutaneous solution  -- 15  subcutaneous once a day (at bedtime)  -- Indication: For Diabetes    Victoza 18 mg/3 mL subcutaneous solution  -- 1.8 milligram(s) subcutaneous once a day  -- Indication: For Diabetes    Abilify Maintena 400 mg intramuscular injection, extended release  --  intramuscular once a month  -- Indication: For Psych & behavrl factors assoc w disord or dis classd elswhr    Abilify 20 mg oral tablet  -- 1 tab(s) by mouth once a day  -- Indication: For Psych & behavrl factors assoc w disord or dis classd elswhr    metoprolol  -- 12.5 milligram(s) by mouth 2 times a day  -- Indication: For Atrial fibrillation    furosemide 20 mg oral tablet  -- 1 tab(s) by mouth once a day  -- Indication: For Heart failure    docusate sodium 100 mg oral capsule  -- 1 cap(s) by mouth 2 times a day  -- Indication: For COnstipation    folic acid 1 mg oral tablet  -- 1 tab(s) by mouth once a day  -- Indication: For Hx of alcohol use    thiamine 100 mg oral tablet  -- 1 tab(s) by mouth once a day  -- Indication: For Hx of alcohol use

## 2018-12-21 NOTE — DISCHARGE NOTE ADULT - MEDICATION SUMMARY - MEDICATIONS TO STOP TAKING
I will STOP taking the medications listed below when I get home from the hospital:    Cozaar 100 mg oral tablet  -- 1 tab(s) by mouth once a day    Norvasc 10 mg oral tablet  -- 1 tab(s) by mouth once a day

## 2018-12-21 NOTE — CONSULT NOTE ADULT - SUBJECTIVE AND OBJECTIVE BOX
HPI:  58/M hx of DM & HTN, presented to ED via EMS for SOB. Pt had an episode of SOB night PTP that resolved with sitting up. Day of presentation he had SOB, especially after  two slices of pizza, and finally family called 911. EMS states that he was in distress with low O2 sat and placed on CPAP. In ER patient had high BP on arrival and was treated with IV NTG, later he was intubated and required IV Levophed for low BP (IV NTG was d/c'ed). In ED WBC 20K, Lactic Acid 4, Trops 0.04 > 0.05, BNP 1600, CXR with b/l diffuse opacities. Admit to CCU for Acute respiratory failure from septic Shock requiring intubation and pressor support. During hospital course pt found to have a fever (101.5F), concerning for PNA (LLL).     PTN  REFERRED TO ACUTE  REHAB  FOR  EVAL AND  TX   PAST MEDICAL & SURGICAL HISTORY:  ETOH abuse  HLD (hyperlipidemia)  Hypertension, unspecified type  COPD (chronic obstructive pulmonary disease)  Schizophrenia  Psych & behavrl factors assoc w disord or dis classd elswhr  Diabetes  No significant past surgical history      Hospital Course:    TODAY'S SUBJECTIVE & REVIEW OF SYMPTOMS:     Constitutional WNL   Cardio WNL   Resp WNL   GI WNL  Heme WNL  Endo WNL  Skin WNL  MSK WNL  Neuro WNL  Cognitive WNL  Psych WNL      MEDICATIONS  (STANDING):  amiodarone    Tablet 200 milliGRAM(s) Oral daily  ARIPiprazole 20 milliGRAM(s) Oral daily  betamethasone valerate 0.1% Cream 1 Application(s) Topical two times a day  chlorhexidine 0.12% Liquid 15 milliLiter(s) Oral Mucosa two times a day  chlorhexidine 4% Liquid 1 Application(s) Topical <User Schedule>  clotrimazole 1% Cream 1 Application(s) Topical two times a day  dextrose 50% Injectable 50 milliLiter(s) IV Push every 15 minutes  dextrose 50% Injectable 25 milliLiter(s) IV Push every 15 minutes  docusate sodium 100 milliGRAM(s) Oral two times a day  enoxaparin Injectable 100 milliGRAM(s) SubCutaneous <User Schedule>  folic acid 1 milliGRAM(s) Oral daily  insulin glargine Injectable (LANTUS) 24 Unit(s) SubCutaneous at bedtime  insulin lispro (HumaLOG) corrective regimen sliding scale   SubCutaneous three times a day before meals  insulin lispro Injectable (HumaLOG) 8 Unit(s) SubCutaneous three times a day before meals  losartan 50 milliGRAM(s) Oral daily  metoprolol tartrate 12.5 milliGRAM(s) Oral two times a day  pantoprazole    Tablet 40 milliGRAM(s) Oral before breakfast  potassium chloride    Tablet ER 40 milliEquivalent(s) Oral every 4 hours  predniSONE   Tablet 20 milliGRAM(s) Oral daily  thiamine 100 milliGRAM(s) Oral daily    MEDICATIONS  (PRN):  acetaminophen   Tablet .. 650 milliGRAM(s) Oral every 6 hours PRN Moderate Pain (4 - 6)  ALBUTerol   0.5% 2.5 milliGRAM(s) Nebulizer every 4 hours PRN Shortness of Breath and/or Wheezing      FAMILY HISTORY:  No pertinent family history in first degree relatives      Allergies    No Known Allergies    Intolerances        SOCIAL HISTORY:    [  ] Etoh  [  ] Smoking  [  ] Substance abuse     Home Environment:  [  ] Home Alone  [  ] Lives with Family  [  ] Home Health Aid    Dwelling:  [ x ] Apartment  [  ] Private House  [  ] Adult Home  [  ] Skilled Nursing Facility      [  ] Short Term  [  ] Long Term  [  ] Stairs       Elevator [  ]    FUNCTIONAL STATUS PTA: (Check all that apply)  Ambulation: [   ]Independent    [ x ] Dependent     [  ] Non-Ambulatory  Assistive Device: [  ] SA Cane  [  ]  Q Cane  [x  ] Walker  [  ]  Wheelchair  ADL : [  x] Independent  [  ]  Dependent       Vital Signs Last 24 Hrs  T(C): 35.5 (21 Dec 2018 07:00), Max: 36.3 (20 Dec 2018 23:17)  T(F): 95.9 (21 Dec 2018 07:00), Max: 97.4 (20 Dec 2018 23:17)  HR: 66 (21 Dec 2018 07:22) (62 - 95)  BP: 144/67 (21 Dec 2018 07:22) (128/60 - 160/73)  BP(mean): 97 (21 Dec 2018 07:22) (88 - 105)  RR: 19 (21 Dec 2018 07:22) (11 - 37)  SpO2: 96% (21 Dec 2018 07:22) (92% - 99%)      PHYSICAL EXAM: Alert & Oriented X3  GENERAL: NAD, well-groomed, well-developed  HEAD:  Atraumatic, Normocephalic  EYES: EOMI, PERRLA, conjunctiva and sclera clear  NECK: Supple, No JVD, Normal thyroid  CHEST/LUNG: Clear to percussion bilaterally; No rales, rhonchi, wheezing, or rubs  HEART: Regular rate and rhythm; No murmurs, rubs, or gallops  ABDOMEN: Soft, Nontender, Nondistended; Bowel sounds present  EXTREMITIES:  2+ Peripheral Pulses, No clubbing, cyanosis, or edema    NERVOUS SYSTEM:  Cranial Nerves 2-12 intact [ x ] Abnormal  [  ]  ROM: WFL all extremities [  ]  Abnormal [ x ]  Motor Strength: WFL all extremities  [  ]  Abnormal [ x ]  Sensation: intact to light touch [  ] Abnormal [x  ]  Reflexes: Symmetric [  ]  Abnormal [ x ]    FUNCTIONAL STATUS:  Bed Mobility: Independent [  ]  Supervision [  ]  Needs Assistance [ x ]  N/A [  ]  Transfers: Independent [  ]  Supervision [  ]  Needs Assistance [  x]  N/A [  ]   Ambulation: Independent [  ]  Supervision [  ]  Needs Assistance [x ]  N/A [  ]  ADL: Independent [  ] Requires Assistance [  ] N/A [  x]  SEE PT IE NOTES    LABS:                        15.8   9.56  )-----------( 259      ( 21 Dec 2018 06:05 )             45.5     12-21    144  |  98  |  31<H>  ----------------------------<  190<H>  3.2<L>   |  32  |  0.5<L>    Ca    8.8      21 Dec 2018 06:05  Phos  2.6     12-21  Mg     2.1     12-21    TPro  6.0  /  Alb  3.5  /  TBili  1.0  /  DBili  x   /  AST  23  /  ALT  34  /  AlkPhos  81  12-21          RADIOLOGY & ADDITIONAL STUDIES:    Assesment:

## 2018-12-21 NOTE — PROGRESS NOTE ADULT - ASSESSMENT
IMPRESSION:  Acute on chronic hypercapnic respiratory failure on MV  Septic shock secondary to aspiration PNA  Pulmonary edema secondary to HfrEF  HO Schizophrenia  Smoker  hypernatremia  Afib     PLAN:    CNS: no sedation     HEENT: Oral care    PULMONARY: HOB at 45 degrees keep pox > 92 %      CARDIOVASCULAR: Keep I<O; FU with cardio need cardiac cath   keep Is <=OS lasix 20 mg daily for now     GI: GI prophylaxis. start feed   speech aND SWALLOW FOLLOW UP     RENAL:  FU lytes; free water FOLLOW BMP , NA   REPLACE k     INFECTIOUS DISEASE: Panculture; c/w OFF SHARRI     HEMATOLOGICAL:  DVT prophylaxis lovenox therapeutic for afib follow cardiology     ENDOCRINE:  Follow up FS.  Insulin protocol if needed.    follow cardiology

## 2018-12-21 NOTE — DISCHARGE NOTE ADULT - INSTRUCTIONS
Will need rehab as pt had a prolong hospital stay.   Pureed diet with thin liquids. Carbohydrate consistent + DASH diet. Will need rehab as pt had a prolong hospital stay.   Dysphagia 3 cut up think liquids. Carbohydrate consistent + DASH diet.

## 2018-12-21 NOTE — SWALLOW BEDSIDE ASSESSMENT ADULT - SLP GENERAL OBSERVATIONS
in chair; pleasant and cooperative. impulsive with chewables.
Pt received alert and awake in bed. O2 via nasal cannula. AOx3. Brother at bedside. Pt verbally requesting water.

## 2018-12-21 NOTE — PROGRESS NOTE ADULT - SUBJECTIVE AND OBJECTIVE BOX
Patient is a 58y old  Male who presents with a chief complaint of Septic Shock (21 Dec 2018 07:06)      Over Night Events:  Patient seen and examined feel good better on NC       ROS:  See HPI    PHYSICAL EXAM    ICU Vital Signs Last 24 Hrs  T(C): 35.5 (21 Dec 2018 07:00), Max: 36.3 (20 Dec 2018 23:17)  T(F): 95.9 (21 Dec 2018 07:00), Max: 97.4 (20 Dec 2018 23:17)  HR: 66 (21 Dec 2018 07:22) (62 - 95)  BP: 144/67 (21 Dec 2018 07:22) (128/60 - 160/73)  BP(mean): 97 (21 Dec 2018 07:22) (88 - 105)  ABP: --  ABP(mean): --  RR: 19 (21 Dec 2018 07:22) (11 - 37)  SpO2: 96% (21 Dec 2018 07:22) (92% - 99%)      General: Aox3  HEENT:           rod     Lymph Nodes: NO cervical LN   Lungs: Bilateral crackles   Cardiovascular: Regular   Abdomen: Soft, Positive BS  Extremities: No clubbing   Skin: warm   Neurological: no focal deficit   Musculoskeletal: move all ext     I&O's Detail    20 Dec 2018 07:01  -  21 Dec 2018 07:00  --------------------------------------------------------  IN:    insulin Infusion: 5 mL  Total IN: 5 mL    OUT:    Indwelling Catheter - Urethral: 910 mL    Voided: 1 mL  Total OUT: 911 mL    Total NET: -906 mL          LABS:                          15.8   9.56  )-----------( 259      ( 21 Dec 2018 06:05 )             45.5         21 Dec 2018 06:05    144    |  98     |  31     ----------------------------<  190    3.2     |  32     |  0.5      Ca    8.8        21 Dec 2018 06:05  Phos  2.6       21 Dec 2018 06:05  Mg     2.1       21 Dec 2018 06:05    TPro  6.0    /  Alb  3.5    /  TBili  1.0    /  DBili  x      /  AST  23     /  ALT  34     /  AlkPhos  81     21 Dec 2018 06:05  Amylase x     lipase x                                                                                                                                                    Culture - Fungal, Bronchial (collected 18 Dec 2018 14:12)  Source: .Broncial Bronchoalveolar Washings  Preliminary Report (19 Dec 2018 09:08):    Testing in progress    Culture - Fungal, Bronchial (collected 18 Dec 2018 14:12)  Source: .Broncial Bronchoalveolar Lavage  Preliminary Report (20 Dec 2018 07:54):    Rare Yeast like cells    Culture - Bronchial (collected 18 Dec 2018 14:12)  Source: Bronch Wash Bronchoalveolar Washings  Gram Stain (18 Dec 2018 23:56):    Rare Squamous epithelial cells per low power field    Moderate polymorphonuclear leukocytes per low power field    No organisms seen per oil power field  Final Report (20 Dec 2018 19:59):    No growth at 48 hours    Culture - Bronchial (collected 18 Dec 2018 14:12)  Source: Bronch Wash Bronchoalveolar Lavage  Gram Stain (18 Dec 2018 23:53):    Few polymorphonuclear leukocytes per low power field    Rare Squamous epithelial cells per low power field    No organisms seen per oil power field  Final Report (20 Dec 2018 19:58):    Normal Respiratory Carli present    Culture - Acid Fast - Bronchial w/Smear (collected 18 Dec 2018 14:12)  Source: .Broncial Bronchoalveolar Washings    Culture - Acid Fast - Bronchial w/Smear (collected 18 Dec 2018 14:12)  Source: .Broncial Bronchoalveolar Lavage                                                                                       ABG - ( 19 Dec 2018 10:36 )  pH, Arterial: 7.50  pH, Blood: x     /  pCO2: 52    /  pO2: 92    / HCO3: 40    / Base Excess: 14.5  /  SaO2: 96                  MEDICATIONS  (STANDING):  amiodarone    Tablet 200 milliGRAM(s) Oral daily  ARIPiprazole 20 milliGRAM(s) Oral daily  betamethasone valerate 0.1% Cream 1 Application(s) Topical two times a day  chlorhexidine 0.12% Liquid 15 milliLiter(s) Oral Mucosa two times a day  chlorhexidine 4% Liquid 1 Application(s) Topical <User Schedule>  clotrimazole 1% Cream 1 Application(s) Topical two times a day  dextrose 50% Injectable 50 milliLiter(s) IV Push every 15 minutes  dextrose 50% Injectable 25 milliLiter(s) IV Push every 15 minutes  docusate sodium 100 milliGRAM(s) Oral two times a day  enoxaparin Injectable 100 milliGRAM(s) SubCutaneous <User Schedule>  folic acid 1 milliGRAM(s) Oral daily  insulin glargine Injectable (LANTUS) 24 Unit(s) SubCutaneous at bedtime  insulin lispro (HumaLOG) corrective regimen sliding scale   SubCutaneous three times a day before meals  insulin lispro Injectable (HumaLOG) 8 Unit(s) SubCutaneous three times a day before meals  losartan 50 milliGRAM(s) Oral daily  metoprolol tartrate 12.5 milliGRAM(s) Oral two times a day  pantoprazole    Tablet 40 milliGRAM(s) Oral before breakfast  potassium chloride    Tablet ER 40 milliEquivalent(s) Oral every 4 hours  predniSONE   Tablet 20 milliGRAM(s) Oral daily  thiamine 100 milliGRAM(s) Oral daily    MEDICATIONS  (PRN):  acetaminophen   Tablet .. 650 milliGRAM(s) Oral every 6 hours PRN Moderate Pain (4 - 6)  ALBUTerol   0.5% 2.5 milliGRAM(s) Nebulizer every 4 hours PRN Shortness of Breath and/or Wheezing          Xrays:  TLC:  OG:  ET tube:                                                                                    small b/l effusion    ECHO:

## 2018-12-21 NOTE — DISCHARGE NOTE ADULT - CARE PROVIDERS DIRECT ADDRESSES
[email or web address]

## 2018-12-21 NOTE — DISCHARGE NOTE ADULT - HOSPITAL COURSE
58/M hx of DM & HTN, presented to ED via EMS for SOB. Pt had an episode of SOB night PTP that resolved with sitting up. Day of presentation he had SOB, especially after  two slices of pizza, and finally family called 911. EMS states that he was in distress with low O2 sat and placed on CPAP. In ER patient had high BP on arrival and was treated with IV NTG, later he was intubated and required IV Levophed for low BP (IV NTG was d/c'ed). In ED WBC 20K, Lactic Acid 4, Trops 0.04 > 0.05, BNP 1600, CXR with b/l diffuse opacities. Admit to CCU for Acute respiratory failure from septic Shock requiring intubation and pressor support. During hospital course pt found to have a fever (101.5F), concerning for PNA (LLL). Pt required intubation and pressor support. He was eventually was extubated while in the CCU. Meropenem course completed. All cultures negative including bronchoscopy. 58/M hx of DM & HTN, presented to ED via EMS for SOB. Pt had an episode of SOB night PTP that resolved with sitting up. Day of presentation he had SOB, especially after  two slices of pizza, and finally family called 911. EMS states that he was in distress with low O2 sat and placed on CPAP. In ER patient had high BP on arrival and was treated with IV NTG, later he was intubated and required IV Levophed for low BP (IV NTG was d/c'ed). In ED WBC 20K, Lactic Acid 4, Trops 0.04 > 0.05, BNP 1600, CXR with b/l diffuse opacities. Admit to CCU for Acute respiratory failure from septic Shock requiring intubation and pressor support. During hospital course pt found to have a fever (101.5F), concerning for PNA (LLL). Pt required intubation and pressor support. He was eventually was extubated while in the CCU. Meropenem course completed. All cultures negative including bronchoscopy. His initial TTE done showed EF 30-35%; upon repeat after septic shock had resolved his EF was 35-44%. His course was also complicated by Atrial fibrillation, which a/c was initiated for CVA prophylaxis, and B-blcoker & amiodarone for rate control. Upon extubation he was very weak and requires rehabilitation at NH. 58/M hx of DM & HTN, presented to ED via EMS for SOB. Pt had an episode of SOB night PTP that resolved with sitting up. Day of presentation he had SOB, especially after  two slices of pizza, and finally family called 911. EMS states that he was in distress with low O2 sat and placed on CPAP. In ER patient had high BP on arrival and was treated with IV NTG, later he was intubated and required IV Levophed for low BP (IV NTG was d/c'ed). In ED WBC 20K, Lactic Acid 4, Trops 0.04 > 0.05, BNP 1600, CXR with b/l diffuse opacities. Admit to CCU for Acute respiratory failure from septic Shock requiring intubation and pressor support. During hospital course pt found to have a fever (101.5F), concerning for PNA (LLL) - possibly aspiration PNA.  Pt required intubation and pressor support. He was eventually was extubated while in the CCU. Meropenem course completed. All cultures negative including bronchoscopy. His initial TTE done showed EF 30-35%; upon repeat after septic shock had resolved his EF was 35-44%. His course was also complicated by Atrial fibrillation, which a/c was initiated for CVA prophylaxis, and B-blcoker & amiodarone for rate control. Upon extubation he was very weak and requires rehabilitation at NH.

## 2018-12-21 NOTE — CONSULT NOTE ADULT - ASSESSMENT
IMPRESSION: Rehab of 59 y/o  m rehab  for debility  myopathy  gd      PRECAUTIONS: [  ] Cardiac  [  ] Respiratory  [  ] Seizures [  ] Contact Isolation  [  ] Droplet Isolation  [  fall] Other    Weight Bearing Status:     RECOMMENDATION:    Out of Bed to Chair     DVT/Decubiti Prophylaxis    REHAB PLAN:     [  x ] Bedside P/T 3-5 times a week   [   ]   Bedside O/T  2-3 times a week             [   ] No Rehab Therapy Indicated                   [   ]  Speech Therapy   Conditioning/ROM                                    ADL  Bed Mobility                                               Conditioning/ROM  Transfers                                                     Bed Mobility  Sitting /Standing Balance                         Transfers                                        Gait Training                                               Sitting/Standing Balance  Stair Training [   ]Applicable                    Home equipment Eval                                                                        Splinting  [   ] Only      GOALS:   ADL   [ x  ]   Independent                    Transfers  [x   ] Independent                          Ambulation  [  x ] Independent     [  xx  ] With device xx                            [ x  ]  CG                                                         [x ]  CG                                                                  [   ] CG                            [    ] Min A                                                   [   ] Min A                                                              [   ] Min  A          DISCHARGE PLAN:   [   ]  Good candidate for Intensive Rehabilitation/Hospital based-[facility]                                             Will tolerate 3hrs Intensive Rehab Daily                                       [  xx  ]  Short Term Rehab in Skilled Nursing Facility                                       [    ]  Home with Outpatient or VN services                                         [    ]  Possible Candidate for Intensive Hospital based Rehab

## 2018-12-21 NOTE — SWALLOW BEDSIDE ASSESSMENT ADULT - ORAL PREPARATORY PHASE
Anterior loss of bolus/decreased bolus manipulation
Decreased mastication ability
Within functional limits

## 2018-12-22 VITALS
DIASTOLIC BLOOD PRESSURE: 60 MMHG | TEMPERATURE: 98 F | SYSTOLIC BLOOD PRESSURE: 127 MMHG | RESPIRATION RATE: 22 BRPM | HEART RATE: 59 BPM

## 2018-12-22 LAB
ALBUMIN SERPL ELPH-MCNC: 3.8 G/DL — SIGNIFICANT CHANGE UP (ref 3.5–5.2)
ALP SERPL-CCNC: 84 U/L — SIGNIFICANT CHANGE UP (ref 30–115)
ALT FLD-CCNC: 37 U/L — SIGNIFICANT CHANGE UP (ref 0–41)
ANION GAP SERPL CALC-SCNC: 12 MMOL/L — SIGNIFICANT CHANGE UP (ref 7–14)
AST SERPL-CCNC: 24 U/L — SIGNIFICANT CHANGE UP (ref 0–41)
BASOPHILS # BLD AUTO: 0.02 K/UL — SIGNIFICANT CHANGE UP (ref 0–0.2)
BASOPHILS NFR BLD AUTO: 0.2 % — SIGNIFICANT CHANGE UP (ref 0–1)
BILIRUB SERPL-MCNC: 1 MG/DL — SIGNIFICANT CHANGE UP (ref 0.2–1.2)
BUN SERPL-MCNC: 24 MG/DL — HIGH (ref 10–20)
CALCIUM SERPL-MCNC: 9.1 MG/DL — SIGNIFICANT CHANGE UP (ref 8.5–10.1)
CHLORIDE SERPL-SCNC: 98 MMOL/L — SIGNIFICANT CHANGE UP (ref 98–110)
CO2 SERPL-SCNC: 34 MMOL/L — HIGH (ref 17–32)
CREAT SERPL-MCNC: 0.5 MG/DL — LOW (ref 0.7–1.5)
EOSINOPHIL # BLD AUTO: 0.2 K/UL — SIGNIFICANT CHANGE UP (ref 0–0.7)
EOSINOPHIL NFR BLD AUTO: 1.8 % — SIGNIFICANT CHANGE UP (ref 0–8)
GLUCOSE BLDC GLUCOMTR-MCNC: 198 MG/DL — HIGH (ref 70–99)
GLUCOSE BLDC GLUCOMTR-MCNC: 343 MG/DL — HIGH (ref 70–99)
GLUCOSE SERPL-MCNC: 178 MG/DL — HIGH (ref 70–99)
HCT VFR BLD CALC: 45.1 % — SIGNIFICANT CHANGE UP (ref 42–52)
HGB BLD-MCNC: 15.4 G/DL — SIGNIFICANT CHANGE UP (ref 14–18)
IMM GRANULOCYTES NFR BLD AUTO: 0.5 % — HIGH (ref 0.1–0.3)
LYMPHOCYTES # BLD AUTO: 18.5 % — LOW (ref 20.5–51.1)
LYMPHOCYTES # BLD AUTO: 2.01 K/UL — SIGNIFICANT CHANGE UP (ref 1.2–3.4)
MAGNESIUM SERPL-MCNC: 2.1 MG/DL — SIGNIFICANT CHANGE UP (ref 1.8–2.4)
MCHC RBC-ENTMCNC: 30.4 PG — SIGNIFICANT CHANGE UP (ref 27–31)
MCHC RBC-ENTMCNC: 34.1 G/DL — SIGNIFICANT CHANGE UP (ref 32–37)
MCV RBC AUTO: 89 FL — SIGNIFICANT CHANGE UP (ref 80–94)
MONOCYTES # BLD AUTO: 0.54 K/UL — SIGNIFICANT CHANGE UP (ref 0.1–0.6)
MONOCYTES NFR BLD AUTO: 5 % — SIGNIFICANT CHANGE UP (ref 1.7–9.3)
NEUTROPHILS # BLD AUTO: 8.02 K/UL — HIGH (ref 1.4–6.5)
NEUTROPHILS NFR BLD AUTO: 74 % — SIGNIFICANT CHANGE UP (ref 42.2–75.2)
PLATELET # BLD AUTO: 273 K/UL — SIGNIFICANT CHANGE UP (ref 130–400)
POTASSIUM SERPL-MCNC: 3.8 MMOL/L — SIGNIFICANT CHANGE UP (ref 3.5–5)
POTASSIUM SERPL-SCNC: 3.8 MMOL/L — SIGNIFICANT CHANGE UP (ref 3.5–5)
PROT SERPL-MCNC: 6.5 G/DL — SIGNIFICANT CHANGE UP (ref 6–8)
RBC # BLD: 5.07 M/UL — SIGNIFICANT CHANGE UP (ref 4.7–6.1)
RBC # FLD: 12.8 % — SIGNIFICANT CHANGE UP (ref 11.5–14.5)
SODIUM SERPL-SCNC: 144 MMOL/L — SIGNIFICANT CHANGE UP (ref 135–146)
WBC # BLD: 10.84 K/UL — HIGH (ref 4.8–10.8)
WBC # FLD AUTO: 10.84 K/UL — HIGH (ref 4.8–10.8)

## 2018-12-22 RX ADMIN — Medication 1 MILLIGRAM(S): at 11:15

## 2018-12-22 RX ADMIN — Medication 12.5 MILLIGRAM(S): at 05:03

## 2018-12-22 RX ADMIN — Medication 20 MILLIGRAM(S): at 05:03

## 2018-12-22 RX ADMIN — PANTOPRAZOLE SODIUM 40 MILLIGRAM(S): 20 TABLET, DELAYED RELEASE ORAL at 07:59

## 2018-12-22 RX ADMIN — LOSARTAN POTASSIUM 50 MILLIGRAM(S): 100 TABLET, FILM COATED ORAL at 05:02

## 2018-12-22 RX ADMIN — ENOXAPARIN SODIUM 100 MILLIGRAM(S): 100 INJECTION SUBCUTANEOUS at 05:04

## 2018-12-22 RX ADMIN — AMIODARONE HYDROCHLORIDE 200 MILLIGRAM(S): 400 TABLET ORAL at 05:03

## 2018-12-22 RX ADMIN — Medication 100 MILLIGRAM(S): at 11:15

## 2018-12-22 RX ADMIN — ARIPIPRAZOLE 20 MILLIGRAM(S): 15 TABLET ORAL at 11:15

## 2018-12-22 RX ADMIN — Medication 8: at 08:08

## 2018-12-22 RX ADMIN — Medication 8 UNIT(S): at 08:10

## 2018-12-22 RX ADMIN — Medication 2: at 11:57

## 2018-12-22 RX ADMIN — CHLORHEXIDINE GLUCONATE 1 APPLICATION(S): 213 SOLUTION TOPICAL at 10:04

## 2018-12-22 RX ADMIN — Medication 8 UNIT(S): at 11:57

## 2018-12-22 NOTE — PROGRESS NOTE ADULT - PROVIDER SPECIALTY LIST ADULT
CCU
Cardiology
Internal Medicine
Pulmonology
CCU
CCU
Cardiology
Cardiology
Pulmonology
Pulmonology

## 2018-12-22 NOTE — PROGRESS NOTE ADULT - ASSESSMENT
IMPRESSION:  Acute on chronic hypercapnic respiratory failure on MV  Septic shock secondary to aspiration PNA  Pulmonary edema secondary to HfrEF  HO Schizophrenia  Smoker  hypernatremia  Afib     PLAN:    CNS: no sedation     HEENT: Oral care    PULMONARY: HOB at 45 degrees keep pox > 92 %      CARDIOVASCULAR: Keep I<O; FU with cardio need cardiac cath   keep Is <=OS lasix 20 mg daily for now     GI: GI prophylaxis.  feedING      RENAL:  FU lytes; free water FOLLOW BMP ,   REPLACE k     INFECTIOUS DISEASE: Panculture; c/w OFF SHARRI     HEMATOLOGICAL:  DVT prophylaxis lovenox therapeutic for afib follow cardiology SWITCH TO ORAL ac     ENDOCRINE:  Follow up FS.  Insulin protocol if needed.    follow cardiology

## 2018-12-22 NOTE — PROGRESS NOTE ADULT - REASON FOR ADMISSION
Septic Shock
CHF, septic shock, hypercapneic resp failure
Septic Shock

## 2018-12-22 NOTE — PROGRESS NOTE ADULT - ASSESSMENT
Patient  off vent. He was drinking 1 quart vodka a day.  Echo now EF 35 -44 %. On beta and arb .  EF better. HR acceptable.  Now in NSR. PO amiodarone . Medical Rx for now. . Patient told no Alcohol. Dobutamine SE or se in about 6 weeks as outpatient. Without alcohol lv may continue to improve.   Patient aware out patient F/U and stress test. Rehab. Prognosis guarded

## 2018-12-22 NOTE — PROGRESS NOTE ADULT - SUBJECTIVE AND OBJECTIVE BOX
Patient is a 58y old  Male who presents with a chief complaint of Septic Shock (22 Dec 2018 06:40)      T(F): 97.3 (12-21-18 @ 23:13), Max: 97.5 (12-21-18 @ 15:00)  HR: 63 (12-22-18 @ 05:11)  BP: 146/67 (12-22-18 @ 05:11)  RR: 19 (12-22-18 @ 05:11)  SpO2: 98% (12-22-18 @ 05:11) (93% - 98%)    PHYSICAL EXAM:  GENERAL: NAD, well-groomed, well-developed  HEAD:  Atraumatic, Normocephalic  EYES: EOMI, PERRLA, conjunctiva and sclera clear  ENMT: No tonsillar erythema, exudates, or enlargement; Moist mucous membranes, Good dentition, No lesions  NECK: Supple, No JVD, Normal thyroid  NERVOUS SYSTEM:  Alert & Oriented X3,  Motor Strength 5/5 B/L upper and lower extremities  CHEST/LUNG: Clear to percussion bilaterally; No rales, rhonchi, wheezing, or rubs  HEART: Regular rate and rhythm; No murmurs, rubs, or gallops  ABDOMEN: Soft, Nontender, Nondistended; Bowel sounds present  EXTREMITIES:   No clubbing, cyanosis, or edema  LYMPH: No lymphadenopathy noted  SKIN: No rashes or lesions    labs  12-21    144  |  98  |  31<H>  ----------------------------<  190<H>  3.2<L>   |  32  |  0.5<L>    Ca    8.8      21 Dec 2018 06:05  Phos  2.6     12-21  Mg     2.1     12-21    TPro  6.0  /  Alb  3.5  /  TBili  1.0  /  DBili  x   /  AST  23  /  ALT  34  /  AlkPhos  81  12-21                          15.4   10.84 )-----------( 273      ( 22 Dec 2018 06:30 )             45.1               acetaminophen   Tablet .. 650 milliGRAM(s) Oral every 6 hours PRN  ALBUTerol   0.5% 2.5 milliGRAM(s) Nebulizer every 4 hours PRN  amiodarone    Tablet 200 milliGRAM(s) Oral daily  ARIPiprazole 20 milliGRAM(s) Oral daily  betamethasone valerate 0.1% Cream 1 Application(s) Topical two times a day  chlorhexidine 4% Liquid 1 Application(s) Topical <User Schedule>  clotrimazole 1% Cream 1 Application(s) Topical two times a day  dextrose 50% Injectable 50 milliLiter(s) IV Push every 15 minutes  dextrose 50% Injectable 25 milliLiter(s) IV Push every 15 minutes  docusate sodium 100 milliGRAM(s) Oral two times a day  enoxaparin Injectable 100 milliGRAM(s) SubCutaneous <User Schedule>  folic acid 1 milliGRAM(s) Oral daily  furosemide    Tablet 20 milliGRAM(s) Oral daily  insulin glargine Injectable (LANTUS) 24 Unit(s) SubCutaneous at bedtime  insulin lispro (HumaLOG) corrective regimen sliding scale   SubCutaneous three times a day before meals  insulin lispro Injectable (HumaLOG) 8 Unit(s) SubCutaneous three times a day before meals  losartan 50 milliGRAM(s) Oral daily  metoprolol tartrate 12.5 milliGRAM(s) Oral two times a day  pantoprazole    Tablet 40 milliGRAM(s) Oral before breakfast  predniSONE   Tablet   Oral   predniSONE   Tablet 20 milliGRAM(s) Oral daily  thiamine 100 milliGRAM(s) Oral daily

## 2018-12-22 NOTE — PROGRESS NOTE ADULT - SUBJECTIVE AND OBJECTIVE BOX
Patient is a 58y old  Male who presents with a chief complaint of Septic Shock (21 Dec 2018 13:29)      Over Night Events:  Patient seen and examined feel good ambulating       ROS:  See HPI    PHYSICAL EXAM    ICU Vital Signs Last 24 Hrs  T(C): 36.3 (21 Dec 2018 23:13), Max: 36.4 (21 Dec 2018 15:00)  T(F): 97.3 (21 Dec 2018 23:13), Max: 97.5 (21 Dec 2018 15:00)  HR: 63 (22 Dec 2018 05:11) (53 - 87)  BP: 146/67 (22 Dec 2018 05:11) (114/60 - 146/67)  BP(mean): 96 (22 Dec 2018 05:11) (81 - 98)  ABP: --  ABP(mean): --  RR: 19 (22 Dec 2018 05:11) (18 - 24)  SpO2: 98% (22 Dec 2018 05:11) (93% - 98%)      General: aOX3  HEENT:       GISSEL         Lymph Nodes: NO cervical LN   Lungs: Bilateral CRACKLES MILD   Cardiovascular: Regular   Abdomen: Soft, Positive BS  Extremities: No clubbing   Skin: WARM  Neurological: NO FOCAL DEFICIT   Musculoskeletal: move all ext     I&O's Detail    20 Dec 2018 07:01  -  21 Dec 2018 07:00  --------------------------------------------------------  IN:    insulin Infusion: 5 mL  Total IN: 5 mL    OUT:    Indwelling Catheter - Urethral: 910 mL    Voided: 1 mL  Total OUT: 911 mL    Total NET: -906 mL      21 Dec 2018 07:01  -  22 Dec 2018 06:40  --------------------------------------------------------  IN:    Oral Fluid: 600 mL  Total IN: 600 mL    OUT:    Voided: 1370 mL  Total OUT: 1370 mL    Total NET: -770 mL          LABS:                          15.8   9.56  )-----------( 259      ( 21 Dec 2018 06:05 )             45.5         21 Dec 2018 06:05    144    |  98     |  31     ----------------------------<  190    3.2     |  32     |  0.5      Ca    8.8        21 Dec 2018 06:05  Phos  2.6       21 Dec 2018 06:05  Mg     2.1       21 Dec 2018 06:05                                                                                                                                                                                                                                       MEDICATIONS  (STANDING):  amiodarone    Tablet 200 milliGRAM(s) Oral daily  ARIPiprazole 20 milliGRAM(s) Oral daily  betamethasone valerate 0.1% Cream 1 Application(s) Topical two times a day  chlorhexidine 4% Liquid 1 Application(s) Topical <User Schedule>  clotrimazole 1% Cream 1 Application(s) Topical two times a day  dextrose 50% Injectable 50 milliLiter(s) IV Push every 15 minutes  dextrose 50% Injectable 25 milliLiter(s) IV Push every 15 minutes  docusate sodium 100 milliGRAM(s) Oral two times a day  enoxaparin Injectable 100 milliGRAM(s) SubCutaneous <User Schedule>  folic acid 1 milliGRAM(s) Oral daily  furosemide    Tablet 20 milliGRAM(s) Oral daily  insulin glargine Injectable (LANTUS) 24 Unit(s) SubCutaneous at bedtime  insulin lispro (HumaLOG) corrective regimen sliding scale   SubCutaneous three times a day before meals  insulin lispro Injectable (HumaLOG) 8 Unit(s) SubCutaneous three times a day before meals  losartan 50 milliGRAM(s) Oral daily  metoprolol tartrate 12.5 milliGRAM(s) Oral two times a day  pantoprazole    Tablet 40 milliGRAM(s) Oral before breakfast  predniSONE   Tablet   Oral   predniSONE   Tablet 20 milliGRAM(s) Oral daily  thiamine 100 milliGRAM(s) Oral daily    MEDICATIONS  (PRN):  acetaminophen   Tablet .. 650 milliGRAM(s) Oral every 6 hours PRN Moderate Pain (4 - 6)  ALBUTerol   0.5% 2.5 milliGRAM(s) Nebulizer every 4 hours PRN Shortness of Breath and/or Wheezing          Xrays:  TLC:  OG:  ET tube:                                                                                    MILD CONGESTION    ECHO:

## 2018-12-23 LAB
CULTURE RESULTS: SIGNIFICANT CHANGE UP
SPECIMEN SOURCE: SIGNIFICANT CHANGE UP

## 2018-12-24 ENCOUNTER — OUTPATIENT (OUTPATIENT)
Dept: OUTPATIENT SERVICES | Facility: HOSPITAL | Age: 58
LOS: 1 days | Discharge: HOME | End: 2018-12-24

## 2018-12-24 DIAGNOSIS — A41.9 SEPSIS, UNSPECIFIED ORGANISM: ICD-10-CM

## 2018-12-24 DIAGNOSIS — E13.8 OTHER SPECIFIED DIABETES MELLITUS WITH UNSPECIFIED COMPLICATIONS: ICD-10-CM

## 2018-12-24 PROBLEM — E78.5 HYPERLIPIDEMIA, UNSPECIFIED: Chronic | Status: ACTIVE | Noted: 2018-12-08

## 2018-12-24 PROBLEM — F54 PSYCHOLOGICAL AND BEHAVIORAL FACTORS ASSOCIATED WITH DISORDERS OR DISEASES CLASSIFIED ELSEWHERE: Chronic | Status: ACTIVE | Noted: 2018-12-08

## 2018-12-24 PROBLEM — E11.9 TYPE 2 DIABETES MELLITUS WITHOUT COMPLICATIONS: Chronic | Status: ACTIVE | Noted: 2018-12-08

## 2018-12-24 PROBLEM — I10 ESSENTIAL (PRIMARY) HYPERTENSION: Chronic | Status: ACTIVE | Noted: 2018-12-08

## 2018-12-24 PROBLEM — F20.9 SCHIZOPHRENIA, UNSPECIFIED: Chronic | Status: ACTIVE | Noted: 2018-12-08

## 2018-12-24 PROBLEM — J44.9 CHRONIC OBSTRUCTIVE PULMONARY DISEASE, UNSPECIFIED: Chronic | Status: ACTIVE | Noted: 2018-12-08

## 2018-12-24 PROBLEM — F10.10 ALCOHOL ABUSE, UNCOMPLICATED: Chronic | Status: ACTIVE | Noted: 2018-12-08

## 2018-12-24 LAB — GLUCOSE BLDC GLUCOMTR-MCNC: 203 MG/DL — HIGH (ref 70–99)

## 2018-12-26 DIAGNOSIS — E87.4 MIXED DISORDER OF ACID-BASE BALANCE: ICD-10-CM

## 2018-12-26 DIAGNOSIS — I47.2 VENTRICULAR TACHYCARDIA: ICD-10-CM

## 2018-12-26 DIAGNOSIS — J96.22 ACUTE AND CHRONIC RESPIRATORY FAILURE WITH HYPERCAPNIA: ICD-10-CM

## 2018-12-26 DIAGNOSIS — J69.0 PNEUMONITIS DUE TO INHALATION OF FOOD AND VOMIT: ICD-10-CM

## 2018-12-26 DIAGNOSIS — I31.3 PERICARDIAL EFFUSION (NONINFLAMMATORY): ICD-10-CM

## 2018-12-26 DIAGNOSIS — R13.10 DYSPHAGIA, UNSPECIFIED: ICD-10-CM

## 2018-12-26 DIAGNOSIS — J96.01 ACUTE RESPIRATORY FAILURE WITH HYPOXIA: ICD-10-CM

## 2018-12-26 DIAGNOSIS — F20.9 SCHIZOPHRENIA, UNSPECIFIED: ICD-10-CM

## 2018-12-26 DIAGNOSIS — Z79.4 LONG TERM (CURRENT) USE OF INSULIN: ICD-10-CM

## 2018-12-26 DIAGNOSIS — R06.03 ACUTE RESPIRATORY DISTRESS: ICD-10-CM

## 2018-12-26 DIAGNOSIS — J44.9 CHRONIC OBSTRUCTIVE PULMONARY DISEASE, UNSPECIFIED: ICD-10-CM

## 2018-12-26 DIAGNOSIS — R65.21 SEVERE SEPSIS WITH SEPTIC SHOCK: ICD-10-CM

## 2018-12-26 DIAGNOSIS — F10.10 ALCOHOL ABUSE, UNCOMPLICATED: ICD-10-CM

## 2018-12-26 DIAGNOSIS — I34.0 NONRHEUMATIC MITRAL (VALVE) INSUFFICIENCY: ICD-10-CM

## 2018-12-26 DIAGNOSIS — A41.9 SEPSIS, UNSPECIFIED ORGANISM: ICD-10-CM

## 2018-12-26 DIAGNOSIS — F17.210 NICOTINE DEPENDENCE, CIGARETTES, UNCOMPLICATED: ICD-10-CM

## 2018-12-26 DIAGNOSIS — E11.9 TYPE 2 DIABETES MELLITUS WITHOUT COMPLICATIONS: ICD-10-CM

## 2018-12-26 DIAGNOSIS — E78.5 HYPERLIPIDEMIA, UNSPECIFIED: ICD-10-CM

## 2018-12-26 DIAGNOSIS — I48.91 UNSPECIFIED ATRIAL FIBRILLATION: ICD-10-CM

## 2018-12-26 DIAGNOSIS — E87.0 HYPEROSMOLALITY AND HYPERNATREMIA: ICD-10-CM

## 2018-12-26 DIAGNOSIS — I50.21 ACUTE SYSTOLIC (CONGESTIVE) HEART FAILURE: ICD-10-CM

## 2018-12-26 DIAGNOSIS — I95.9 HYPOTENSION, UNSPECIFIED: ICD-10-CM

## 2018-12-26 DIAGNOSIS — E87.6 HYPOKALEMIA: ICD-10-CM

## 2018-12-26 DIAGNOSIS — I11.0 HYPERTENSIVE HEART DISEASE WITH HEART FAILURE: ICD-10-CM

## 2019-01-16 LAB
CULTURE RESULTS: SIGNIFICANT CHANGE UP
SPECIMEN SOURCE: SIGNIFICANT CHANGE UP

## 2019-01-17 LAB
CULTURE RESULTS: SIGNIFICANT CHANGE UP
SPECIMEN SOURCE: SIGNIFICANT CHANGE UP

## 2019-02-06 LAB
CULTURE RESULTS: SIGNIFICANT CHANGE UP
CULTURE RESULTS: SIGNIFICANT CHANGE UP
SPECIMEN SOURCE: SIGNIFICANT CHANGE UP
SPECIMEN SOURCE: SIGNIFICANT CHANGE UP

## 2020-09-15 NOTE — PATIENT PROFILE ADULT - DO YOU FEEL LIKE HURTING YOURSELF OR OTHERS?
[FreeTextEntry1] : [facility] \par \par [facility]. [phone]\par [address] [place] [address]  no

## 2020-12-07 NOTE — ED PROVIDER NOTE - SEPSIS ALERT QUESTION 1
Labs every 3 months- danilo 4 and f/u 4 months. 
Needs xrays- knees 12/28 or 12/29 10am on   Labs every 3 months  F/u 4 months. 
This patient was evaluated for sepsis.  At this time, a diagnosis of sepsis is not supported by the overall clinical picture.

## 2021-04-01 NOTE — CHART NOTE - NSCHARTNOTESELECT_GEN_ALL_CORE
Check your blood sugar when you notice an episode coming and during the episode and at end of episode.      Event Note
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
